# Patient Record
Sex: FEMALE | Race: BLACK OR AFRICAN AMERICAN | Employment: UNEMPLOYED | URBAN - METROPOLITAN AREA
[De-identification: names, ages, dates, MRNs, and addresses within clinical notes are randomized per-mention and may not be internally consistent; named-entity substitution may affect disease eponyms.]

---

## 2017-03-01 ENCOUNTER — HOSPITAL ENCOUNTER (OUTPATIENT)
Dept: LAB | Age: 52
Discharge: HOME | End: 2017-03-01
Payer: MEDICARE

## 2017-03-01 PROCEDURE — 36415 COLL VENOUS BLD VENIPUNCTURE: CPT

## 2017-03-01 PROCEDURE — 83036 HEMOGLOBIN GLYCOSYLATED A1C: CPT

## 2017-03-01 PROCEDURE — 80061 LIPID PANEL: CPT

## 2017-03-01 PROCEDURE — 81001 URINALYSIS AUTO W/SCOPE: CPT

## 2017-03-01 PROCEDURE — 80053 COMPREHEN METABOLIC PANEL: CPT

## 2017-03-08 ENCOUNTER — OFFICE VISIT (OUTPATIENT)
Dept: ENDOCRINOLOGY | Age: 52
End: 2017-03-08

## 2017-03-08 VITALS
HEART RATE: 53 BPM | SYSTOLIC BLOOD PRESSURE: 129 MMHG | BODY MASS INDEX: 41.02 KG/M2 | TEMPERATURE: 98.4 F | HEIGHT: 71 IN | DIASTOLIC BLOOD PRESSURE: 65 MMHG | RESPIRATION RATE: 18 BRPM | WEIGHT: 293 LBS

## 2017-03-08 DIAGNOSIS — E11.65 TYPE 2 DIABETES MELLITUS WITH HYPERGLYCEMIA, WITH LONG-TERM CURRENT USE OF INSULIN (HCC): Primary | ICD-10-CM

## 2017-03-08 DIAGNOSIS — I10 ESSENTIAL HYPERTENSION WITH GOAL BLOOD PRESSURE LESS THAN 140/90: ICD-10-CM

## 2017-03-08 DIAGNOSIS — Z79.4 TYPE 2 DIABETES MELLITUS WITH HYPERGLYCEMIA, WITH LONG-TERM CURRENT USE OF INSULIN (HCC): Primary | ICD-10-CM

## 2017-03-08 DIAGNOSIS — E78.2 HYPERLIPIDEMIA, MIXED: ICD-10-CM

## 2017-03-08 RX ORDER — INSULIN PUMP SYRINGE, 3 ML
EACH MISCELLANEOUS
Qty: 1 KIT | Refills: 0 | Status: SHIPPED | OUTPATIENT
Start: 2017-03-08 | End: 2018-05-18 | Stop reason: SDUPTHER

## 2017-03-08 RX ORDER — LANCETS 33 GAUGE
EACH MISCELLANEOUS
Qty: 100 LANCET | Refills: 11 | Status: SHIPPED | OUTPATIENT
Start: 2017-03-08 | End: 2017-06-09 | Stop reason: SDUPTHER

## 2017-03-08 NOTE — PROGRESS NOTES
[facility] AND ENDOCRINOLOGY               Celena Schuster MD        [address] 78 444 29 66 Fax [phone] ( [facility]) [address] Didi Schwartz [address] SJ:[phone] Fax [phone] ( Monday)          Patient Information  Date:3/8/2017  Name : Yandy Ortega 49 y.o.     YOB: 1965         Referred by: Jacquelin Loco NP         Chief Complaint   Patient presents with    Diabetes     3 mo f/u    Cholesterol Problem     3 mo f/u    Hypertension     3 mo f/u       History of Present Illness: Yandy Ortega is a 49 y.o. female here for fu of  Type 2 Diabetes Mellitus. She was referred by Jacquelin Loco NP for evaluation and management of diabetes. Checking glucose at home  Has not noticed significant hypoglycemia     Compliant with medications    No acute issues            She was diagnosed with type 2 diabetes 30 years ago,          She has tingling and numbness in her feet. No hypoglycemia. She has been gaining weight, no regular exercise. Wt Readings from Last 3 Encounters:   03/08/17 318 lb 12.8 oz (144.6 kg)   12/07/16 310 lb (140.6 kg)   05/31/16 293 lb 1.6 oz (132.9 kg)       BP Readings from Last 3 Encounters:   03/08/17 129/65   12/07/16 109/54   05/31/16 97/51           Past Medical History:   Diagnosis Date    CAD (coronary artery disease)     Dr Britni Salinas [facility])     Hypertension      Current Outpatient Prescriptions   Medication Sig    SITagliptin (JANUVIA) 100 mg tablet Take 1 Tab by mouth daily.  pioglitazone (ACTOS) 30 mg tablet Take 1 Tab by mouth every morning.  oxybutynin chloride XL (DITROPAN XL) 10 mg CR tablet Take 1 Tab by mouth daily.  metFORMIN (GLUCOPHAGE) 1,000 mg tablet Take 1 Tab by mouth two (2) times daily (with meals). Stop Glipizide (Patient taking differently: Take 1,000 mg by mouth daily.  Stop Glipizide)    Lancets misc Test blood glucose twice daily Dx Code: E11.65    Insulin Needles, Disposable, (CARLIE PEN NEEDLE) 32 gauge x 5/32\" ndle Use to inject Lantus daily    insulin glargine (LANTUS SOLOSTAR) 100 unit/mL (3 mL) pen Inject 15 units at bedtime    glucose blood VI test strips (ONETOUCH ULTRA TEST) strip Test blood glucose twice daily Dx Code: E11.65    Blood-Glucose Meter (ONETOUCH ULTRAMINI) monitoring kit Test blood glucose twice daily Dx Code: E11.65    atorvastatin (LIPITOR) 20 mg tablet Take 20 mg by mouth daily.  nitroglycerin (NITROSTAT) 0.4 mg SL tablet 0.4 mg by SubLINGual route every five (5) minutes as needed for Chest Pain.  clopidogrel (PLAVIX) 75 mg tablet Take 75 mg by mouth daily.  metoprolol succinate (TOPROL-XL) 25 mg XL tablet Take 25 mg by mouth daily.  lisinopril (PRINIVIL, ZESTRIL) 10 mg tablet Take 10 mg by mouth daily.  FLUoxetine (PROZAC) 20 mg capsule Take 20 mg by mouth daily. No current facility-administered medications for this visit. No Known Allergies      Review of Systems:  - Constitutional Symptoms: no fevers, no chills, no weight loss  - Eyes: no blurry vision no double vision  - Musculoskeletal: + joint pains +  weakness  - Integumentary: no rashes  - Neurological: + numbness, tingling, no  headaches  - Psychiatric: no depression no  anxiety  - Endocrine: no heat or cold intolerance    Physical Examination:   Blood pressure 129/65, pulse (!) 53, temperature 98.4 °F (36.9 °C), temperature source Oral, resp. rate 18, height 5' 11\" (1.803 m), weight 318 lb 12.8 oz (144.6 kg). Estimated body mass index is 44.46 kg/(m^2) as calculated from the following:    Height as of this encounter: 5' 11\" (1.803 m). -   Weight as of this encounter: 318 lb 12.8 oz (144.6 kg).   - General: pleasant, no distress, good eye contact  - HEENT: no pallor, no periorbital edema, EOMI  - Neck: supple, no thyromegaly, no nodules  - Cardiovascular: regular, normal rate, normal S1 and S2,  - Respiratory: clear to auscultation bilaterally  - Gastrointestinal: soft, nontender, nondistended,  BS +  - Musculoskeletal:  no edema,   - Neurological: alert and oriented  - Psychiatric: normal mood and affect  - Skin: color, texture, turgor normal.       Data Reviewed:     [] Glucose records reviewed. [] See glucose records for details (to be scanned). [] A1C  [] Reviewed labs        Assessment/Plan:     1. Type 2 diabetes mellitus with hyperglycemia, with long-term current use of insulin ([facility] [address]    2. Essential hypertension with goal blood pressure less than 140/90    3. Hyperlipidemia, mixed        1. Type 2 Diabetes Mellitus , neuropathy   Lab Results   Component Value Date/Time    Hemoglobin A1c 7.9 03/01/2017 09:01 AM    Hemoglobin A1c (POC) 7.6 12/07/2016 03:54 PM      continue Metformin, Januvia and Actos. Lantus -   Discussed the importance of checking home glucose regularly and taking all of their scheduled medications in order to have the best possible outcome. Reviewed the complications and importance of diet. Advised to check glucose once daily  FLU annually ,Pneumovax ,aspirin daily,annual eye exam,microalbumin    2. HTN : Continue current therapy     3. Hyperlipidemia : Continue statin. 4.Obesity:Body mass index is 44.46 kg/(m^2). Discussed about the importance of exercise and carbohydrate portion control. 5. Bradycardia, asymptomatic - managed by Cardiology - on B blockers       There are no Patient Instructions on file for this visit. Follow-up Disposition: Not on File    Thank you for allowing me to participate in the care of this patient.     Joselo Ellis MD

## 2017-03-08 NOTE — MR AVS SNAPSHOT
Visit Information Date & Time Provider Department Dept. Phone Encounter #  
 3/8/2017 10:00 AM Nohemy Bolaños MD [facility] Diabetes & Endocrinology [phone] [id number] Follow-up Instructions Return in about 3 months (around 6/8/2017). Upcoming Health Maintenance Date Due  
 FOOT EXAM Q1 11/16/1976 EYE EXAM RETINAL OR DILATED Q1 11/16/1976 Pneumococcal 19-64 Medium Risk (1 of 1 - PPSV23) 11/16/1985 DTaP/Tdap/Td series (1 - Tdap) 11/16/1987 PAP AKA CERVICAL CYTOLOGY 11/16/1987 INFLUENZA AGE 9 TO ADULT 8/1/2016 BREAST CANCER SCRN MAMMOGRAM 11/16/2016 FOBT Q 1 YEAR AGE 50-75 11/16/2016 MICROALBUMIN Q1 5/24/2017 HEMOGLOBIN A1C Q6M 9/1/2017 LIPID PANEL Q1 3/1/2018 Allergies as of 3/8/2017  Review Complete On: 3/8/2017 By: Nohemy Bolaños MD  
 No Known Allergies Current Immunizations  Never Reviewed No immunizations on file. Not reviewed this visit You Were Diagnosed With   
  
 Codes Comments Type 2 diabetes mellitus with hyperglycemia, with long-term current use of insulin (HCC)    -  Primary ICD-10-CM: E11.65, Z79.4 ICD-9-CM: 250.00, 790.29, V58.67 Essential hypertension with goal blood pressure less than 140/90     ICD-10-CM: I10 
ICD-9-CM: 401.9 Hyperlipidemia, mixed     ICD-10-CM: E78.2 ICD-9-CM: 272.2 Vitals BP Pulse Temp Resp Height(growth percentile) Weight(growth percentile) 129/65 (BP 1 Location: Right arm, BP Patient Position: Sitting) (!) 53 98.4 °F (36.9 °C) (Oral) 18 5' 11\" (1.803 m) 318 lb 12.8 oz (144.6 kg) BMI OB Status Smoking Status 44.46 kg/m2 Ablation Former Smoker Vitals History BMI and BSA Data Body Mass Index Body Surface Area 44.46 kg/m 2 2.69 m 2 Preferred Pharmacy Pharmacy Name Phone [id number] [address] [phone] Your Updated Medication List  
  
   
 This list is accurate as of: 3/8/17 10:56 AM.  Always use your most recent med list.  
  
  
  
  
 atorvastatin 20 mg tablet Commonly known as:  LIPITOR Take 20 mg by mouth daily. Blood-Glucose Meter monitoring kit Commonly known as:  Tulsa Lang Test blood glucose twice daily Dx Code: E11.65  
  
 clopidogrel 75 mg Tab Commonly known as:  PLAVIX Take 75 mg by mouth daily. FLUoxetine 20 mg capsule Commonly known as:  PROzac Take 20 mg by mouth daily. glucose blood VI test strips strip Commonly known as:  ONETOUCH ULTRA TEST Test blood glucose twice daily Dx Code: E11.65  
  
 insulin glargine 100 unit/mL (3 mL) pen Commonly known as:  LANTUS SOLOSTAR Inject 15 units at bedtime Insulin Needles (Disposable) 32 gauge x 5/32\" Ndle Commonly known as:  Verónica Pen Needle Use to inject Lantus daily Lancets Misc Test blood glucose twice daily Dx Code: E11.65  
  
 lisinopril 10 mg tablet Commonly known as:  Kimberly Deborah Take 10 mg by mouth daily. metFORMIN 1,000 mg tablet Commonly known as:  GLUCOPHAGE Take 1 Tab by mouth two (2) times daily (with meals). Stop Glipizide  
  
 metoprolol succinate 25 mg XL tablet Commonly known as:  TOPROL-XL Take 25 mg by mouth daily. nitroglycerin 0.4 mg SL tablet Commonly known as:  NITROSTAT  
0.4 mg by SubLINGual route every five (5) minutes as needed for Chest Pain. oxybutynin chloride XL 10 mg CR tablet Commonly known as:  DITROPAN XL Take 1 Tab by mouth daily. pioglitazone 30 mg tablet Commonly known as:  ACTOS Take 1 Tab by mouth every morning. SITagliptin 100 mg tablet Commonly known as:  Cat Roxie Take 1 Tab by mouth daily. Follow-up Instructions Return in about 3 months (around 6/8/2017). Introducing [facility] & HEALTH SERVICES!    
 [address] introduces Websand patient portal. Now you can access parts of your medical record, email your doctor's office, and request medication refills online. 1. In your internet browser, go to [email or web address]. [email or web address]/[employer] 2. Click on the First Time User? Click Here link in the Sign In box. You will see the New Member Sign Up page. 3. Enter your Veracode Access Code exactly as it appears below. You will not need to use this code after youve completed the sign-up process. If you do not sign up before the expiration date, you must request a new code. · Veracode Access Code: [email or web address] Expires: 6/6/2017 10:56 AM 
 
4. Enter the last four digits of your Social Security Number (xxxx) and Date of Birth (mm/dd/yyyy) as indicated and click Submit. You will be taken to the next sign-up page. 5. Create a Veracode ID. This will be your Veracode login ID and cannot be changed, so think of one that is secure and easy to remember. 6. Create a Veracode password. You can change your password at any time. 7. Enter your Password Reset Question and Answer. This can be used at a later time if you forget your password. 8. Enter your e-mail address. You will receive e-mail notification when new information is available in [address] 9. Click Sign Up. You can now view and download portions of your medical record. 10. Click the Download Summary menu link to download a portable copy of your medical information. If you have questions, please visit the Frequently Asked Questions section of the Veracode website. Remember, Veracode is NOT to be used for urgent needs. For medical emergencies, dial 911. Now available from your iPhone and Android! Please provide this summary of care documentation to your next provider. Your primary care clinician is listed as Wilmer Mckeon. If you have any questions after today's visit, please call [phone].

## 2017-03-08 NOTE — PROGRESS NOTES
Deborah Maldonado is a 49 y.o. female here for   Chief Complaint   Patient presents with    Diabetes     3 mo f/u    Cholesterol Problem     3 mo f/u    Hypertension     3 mo f/u       Functional glucose monitor and record keeping system? - yes  Eye exam within last year? - last yr  Foot exam within last year? - no    Lab Results   Component Value Date/Time    Hemoglobin A1c 7.9 03/01/2017 09:01 AM    Hemoglobin A1c (POC) 7.6 12/07/2016 03:54 PM       Wt Readings from Last 3 Encounters:   12/07/16 310 lb (140.6 kg)   05/31/16 293 lb 1.6 oz (132.9 kg)   02/29/16 289 lb 9.6 oz (131.4 kg)     Temp Readings from Last 3 Encounters:   12/07/16 98.7 °F (37.1 °C) (Oral)   05/31/16 98.1 °F (36.7 °C) (Oral)   02/29/16 97 °F (36.1 °C) (Oral)     BP Readings from Last 3 Encounters:   12/07/16 109/54   05/31/16 97/51   02/29/16 111/60     Pulse Readings from Last 3 Encounters:   12/07/16 (!) 54   05/31/16 (!) 47   02/29/16 (!) 54

## 2017-04-04 DIAGNOSIS — E11.65 TYPE 2 DIABETES MELLITUS WITH HYPERGLYCEMIA, WITH LONG-TERM CURRENT USE OF INSULIN (HCC): Primary | ICD-10-CM

## 2017-04-04 DIAGNOSIS — Z79.4 TYPE 2 DIABETES MELLITUS WITH HYPERGLYCEMIA, WITH LONG-TERM CURRENT USE OF INSULIN (HCC): Primary | ICD-10-CM

## 2017-04-04 NOTE — TELEPHONE ENCOUNTER
Option is to try Trulicity 5.01 mg weekly , stop Lantus and Januvia   Close monitoring of sugars - if it works we can increase the dose and Trulicity will help with weight loss also     Nausea and vomiting are some of the side effects but mostly temporary     She can be trained at out office     Can get 30 day free trial card - if it Norval Bloodgood will go back to old regimen

## 2017-04-04 NOTE — TELEPHONE ENCOUNTER
Patient says she was instructed to call back with pricing of trulicity. Patient says trulicity and Lantus are the same price.

## 2017-04-04 NOTE — TELEPHONE ENCOUNTER
Informed pt she can try Trulicity 7.52 mg weekly and she can stop Lantus and Januvia once she starts Trulicity. Asked pt to monitor BG closely while on the medication and if it works Dr Moses Guido will increase the dose which will help with weight loss. Pt states she will stop by the office tomorrow afternoon for training and 30 day free trial card.

## 2017-06-09 ENCOUNTER — OFFICE VISIT (OUTPATIENT)
Dept: ENDOCRINOLOGY | Age: 52
End: 2017-06-09

## 2017-06-09 VITALS
HEIGHT: 71 IN | TEMPERATURE: 97.7 F | HEART RATE: 50 BPM | BODY MASS INDEX: 41.02 KG/M2 | DIASTOLIC BLOOD PRESSURE: 62 MMHG | SYSTOLIC BLOOD PRESSURE: 128 MMHG | OXYGEN SATURATION: 97 % | RESPIRATION RATE: 16 BRPM | WEIGHT: 293 LBS

## 2017-06-09 DIAGNOSIS — E78.2 HYPERLIPIDEMIA, MIXED: ICD-10-CM

## 2017-06-09 DIAGNOSIS — L73.9 FOLLICULITIS: ICD-10-CM

## 2017-06-09 DIAGNOSIS — Z79.4 UNCONTROLLED TYPE 2 DIABETES MELLITUS WITH HYPERGLYCEMIA, WITH LONG-TERM CURRENT USE OF INSULIN (HCC): ICD-10-CM

## 2017-06-09 DIAGNOSIS — E11.65 TYPE 2 DIABETES MELLITUS WITH HYPERGLYCEMIA, WITH LONG-TERM CURRENT USE OF INSULIN (HCC): Primary | ICD-10-CM

## 2017-06-09 DIAGNOSIS — N32.81 OVERACTIVE BLADDER: ICD-10-CM

## 2017-06-09 DIAGNOSIS — I10 ESSENTIAL HYPERTENSION WITH GOAL BLOOD PRESSURE LESS THAN 140/90: ICD-10-CM

## 2017-06-09 DIAGNOSIS — Z79.4 TYPE 2 DIABETES MELLITUS WITH HYPERGLYCEMIA, WITH LONG-TERM CURRENT USE OF INSULIN (HCC): Primary | ICD-10-CM

## 2017-06-09 DIAGNOSIS — E11.65 TYPE 2 DIABETES MELLITUS WITH HYPERGLYCEMIA, WITH LONG-TERM CURRENT USE OF INSULIN (HCC): ICD-10-CM

## 2017-06-09 DIAGNOSIS — Z79.4 TYPE 2 DIABETES MELLITUS WITH HYPERGLYCEMIA, WITH LONG-TERM CURRENT USE OF INSULIN (HCC): ICD-10-CM

## 2017-06-09 DIAGNOSIS — E11.65 UNCONTROLLED TYPE 2 DIABETES MELLITUS WITH HYPERGLYCEMIA, WITH LONG-TERM CURRENT USE OF INSULIN (HCC): ICD-10-CM

## 2017-06-09 LAB
GLUCOSE POC: 126 MG/DL
HBA1C MFR BLD HPLC: 6.5 %

## 2017-06-09 RX ORDER — PEN NEEDLE, DIABETIC 31 GX3/16"
NEEDLE, DISPOSABLE MISCELLANEOUS
Qty: 100 PEN NEEDLE | Refills: 11 | Status: SHIPPED | OUTPATIENT
Start: 2017-06-09 | End: 2018-03-26 | Stop reason: SDUPTHER

## 2017-06-09 RX ORDER — METFORMIN HYDROCHLORIDE 1000 MG/1
1000 TABLET ORAL 2 TIMES DAILY WITH MEALS
Qty: 60 TAB | Refills: 5 | Status: SHIPPED | OUTPATIENT
Start: 2017-06-09 | End: 2021-07-29 | Stop reason: SDUPTHER

## 2017-06-09 RX ORDER — OXYBUTYNIN CHLORIDE 10 MG/1
10 TABLET, EXTENDED RELEASE ORAL DAILY
Qty: 30 TAB | Refills: 5 | Status: SHIPPED | OUTPATIENT
Start: 2017-06-09 | End: 2018-03-26 | Stop reason: SDUPTHER

## 2017-06-09 RX ORDER — LANCETS 33 GAUGE
EACH MISCELLANEOUS
Qty: 100 LANCET | Refills: 11 | Status: SHIPPED | OUTPATIENT
Start: 2017-06-09 | End: 2018-05-18 | Stop reason: SDUPTHER

## 2017-06-09 RX ORDER — PIOGLITAZONEHYDROCHLORIDE 30 MG/1
30 TABLET ORAL
Qty: 30 TAB | Refills: 5 | Status: SHIPPED | OUTPATIENT
Start: 2017-06-09 | End: 2018-01-12 | Stop reason: ALTCHOICE

## 2017-06-09 NOTE — MR AVS SNAPSHOT
Visit Information Date & Time Provider Department Dept. Phone Encounter #  
 6/9/2017  9:00 AM Brent Real MD [facility] Diabetes & Endocrinology [phone] [id number] Follow-up Instructions Return in about 3 months (around 9/9/2017). Upcoming Health Maintenance Date Due  
 FOOT EXAM Q1 11/16/1976 EYE EXAM RETINAL OR DILATED Q1 11/16/1976 Pneumococcal 19-64 Medium Risk (1 of 1 - PPSV23) 11/16/1985 DTaP/Tdap/Td series (1 - Tdap) 11/16/1987 PAP AKA CERVICAL CYTOLOGY 11/16/1987 BREAST CANCER SCRN MAMMOGRAM 11/16/2016 FOBT Q 1 YEAR AGE 50-75 11/16/2016 MICROALBUMIN Q1 5/24/2017 INFLUENZA AGE 9 TO ADULT 8/1/2017 HEMOGLOBIN A1C Q6M 9/1/2017 LIPID PANEL Q1 3/1/2018 Allergies as of 6/9/2017  Review Complete On: 6/9/2017 By: Brent Real MD  
 No Known Allergies Current Immunizations  Never Reviewed No immunizations on file. Not reviewed this visit You Were Diagnosed With   
  
 Codes Comments Type 2 diabetes mellitus with hyperglycemia, with long-term current use of insulin (HCC)    -  Primary ICD-10-CM: E11.65, Z79.4 ICD-9-CM: 250.00, 790.29, V58.67 Essential hypertension with goal blood pressure less than 140/90     ICD-10-CM: I10 
ICD-9-CM: 401.9 Hyperlipidemia, mixed     ICD-10-CM: E78.2 ICD-9-CM: 272.2 Vitals BP Pulse Temp Resp Height(growth percentile) Weight(growth percentile) 128/62 (BP 1 Location: Left arm, BP Patient Position: Sitting) (!) 50 97.7 °F (36.5 °C) (Oral) 16 5' 11\" (1.803 m) 313 lb 14.4 oz (142.4 kg) SpO2 BMI OB Status Smoking Status 97% 43.78 kg/m2 Ablation Former Smoker Vitals History BMI and BSA Data Body Mass Index Body Surface Area 43.78 kg/m 2 2.67 m 2 Preferred Pharmacy Pharmacy Name Phone [id number] [address] Tam[address] [phone] Your Updated Medication List  
  
   
 This list is accurate as of: 6/9/17  9:33 AM.  Always use your most recent med list.  
  
  
  
  
 atorvastatin 20 mg tablet Commonly known as:  LIPITOR Take 20 mg by mouth daily. Blood-Glucose Meter monitoring kit Commonly known as:  TRUE METRIX AIR GLUCOSE METER Test blood glucose twice daily Dx Code: E11.65  
  
 clopidogrel 75 mg Tab Commonly known as:  PLAVIX Take 75 mg by mouth daily. dulaglutide 0.75 mg/0.5 mL sub-q pen Commonly known as:  TRULICITY  
0.5 mL by SubCUTAneous route every seven (7) days. glucose blood VI test strips strip Commonly known as:  TRUE METRIX GLUCOSE TEST STRIP Test blood glucose twice daily Dx Code: E11.65 Insulin Needles (Disposable) 32 gauge x 5/32\" Ndle Commonly known as:  Verónica Pen Needle Use to inject Lantus daily  
  
 lancets 33 gauge Misc Commonly known as:  Sabino Kanguillermo Test blood glucose twice daily Dx Code: E11.65  
  
 lisinopril 10 mg tablet Commonly known as:  Roxianne Daughters Take 10 mg by mouth daily. metFORMIN 1,000 mg tablet Commonly known as:  GLUCOPHAGE Take 1 Tab by mouth two (2) times daily (with meals). Stop Glipizide  
  
 metoprolol succinate 25 mg XL tablet Commonly known as:  TOPROL-XL Take 25 mg by mouth daily. nitroglycerin 0.4 mg SL tablet Commonly known as:  NITROSTAT  
0.4 mg by SubLINGual route every five (5) minutes as needed for Chest Pain. oxybutynin chloride XL 10 mg CR tablet Commonly known as:  DITROPAN XL Take 1 Tab by mouth daily. pioglitazone 30 mg tablet Commonly known as:  ACTOS Take 1 Tab by mouth every morning. We Performed the Following AMB POC GLUCOSE, QUANTITATIVE, BLOOD [32740 CPT(R)] AMB POC HEMOGLOBIN A1C [01043 CPT(R)] Follow-up Instructions Return in about 3 months (around 9/9/2017). Introducing [facility] & HEALTH SERVICES!    
 [employer] introduces [employer]flix patient portal. Now you can access parts of your medical record, email your doctor's office, and request medication refills online. 1. In your internet browser, go to [email or web address]. [email or web address] 2. Click on the First Time User? Click Here link in the Sign In box. You will see the New Member Sign Up page. 3. Enter your Cloudwords Access Code exactly as it appears below. You will not need to use this code after youve completed the sign-up process. If you do not sign up before the expiration date, you must request a new code. · Cloudwords Access Code: [email or web address] Expires: 9/7/2017  9:33 AM 
 
4. Enter the last four digits of your Social Security Number (xxxx) and Date of Birth (mm/dd/yyyy) as indicated and click Submit. You will be taken to the next sign-up page. 5. Create a Cloudwords ID. This will be your Cloudwords login ID and cannot be changed, so think of one that is secure and easy to remember. 6. Create a Cloudwords password. You can change your password at any time. 7. Enter your Password Reset Question and Answer. This can be used at a later time if you forget your password. 8. Enter your e-mail address. You will receive e-mail notification when new information is available in [address] 9. Click Sign Up. You can now view and download portions of your medical record. 10. Click the Download Summary menu link to download a portable copy of your medical information. If you have questions, please visit the Frequently Asked Questions section of the Cloudwords website. Remember, Cloudwords is NOT to be used for urgent needs. For medical emergencies, dial 911. Now available from your iPhone and Android! Please provide this summary of care documentation to your next provider. Your primary care clinician is listed as Ela Amador. If you have any questions after today's visit, please call [phone].

## 2017-06-09 NOTE — PROGRESS NOTES
Keith Banks is a 49 y.o. female here for   Chief Complaint   Patient presents with    Diabetes       Functional glucose monitor and record keeping system? -no  Eye exam within last year? - no  Foot exam within last year? - no    1. Have you been to the ER, urgent care clinic since your last visit? Hospitalized since your last visit? -no    2. Have you seen or consulted any other health care providers outside of the [address] [place] [address] since your last visit?   Include any pap smears or colon screening.-no      Lab Results   Component Value Date/Time    Hemoglobin A1c 7.9 03/01/2017 09:01 AM    Hemoglobin A1c (POC) 7.6 12/07/2016 03:54 PM       Wt Readings from Last 3 Encounters:   03/08/17 318 lb 12.8 oz (144.6 kg)   12/07/16 310 lb (140.6 kg)   05/31/16 293 lb 1.6 oz (132.9 kg)     Temp Readings from Last 3 Encounters:   03/08/17 98.4 °F (36.9 °C) (Oral)   12/07/16 98.7 °F (37.1 °C) (Oral)   05/31/16 98.1 °F (36.7 °C) (Oral)     BP Readings from Last 3 Encounters:   03/08/17 129/65   12/07/16 109/54   05/31/16 97/51     Pulse Readings from Last 3 Encounters:   03/08/17 (!) 53   12/07/16 (!) 54   05/31/16 (!) 47

## 2017-06-09 NOTE — PROGRESS NOTES
[facility]               Eneida Mariscal MD        [address] 78 [phone] Fax [phone]  [id number])        [address] [address] [address] XM:[phone] Fax [phone] ( Monday)          Patient Information  Date:6/9/2017  Name : Maxim Torres 49 y.o.     YOB: 1965         Referred by: Cynthia Martinez NP         Chief Complaint   Patient presents with    Diabetes       History of Present Illness: Maxim Torres is a 49 y.o. female here for fu of  Type 2 Diabetes Mellitus. She was referred by Cynthia Martinez NP for evaluation and management of diabetes. Doing well on Trulicity   Lost weight   Checking glucose at home  Has not noticed significant hypoglycemia     Compliant with medications    No acute issues            She was diagnosed with type 2 diabetes 30 years ago,         Wt Readings from Last 3 Encounters:   06/09/17 313 lb 14.4 oz (142.4 kg)   03/08/17 318 lb 12.8 oz (144.6 kg)   12/07/16 310 lb (140.6 kg)       BP Readings from Last 3 Encounters:   06/09/17 128/62   03/08/17 129/65   12/07/16 109/54           Past Medical History:   Diagnosis Date    CAD (coronary artery disease)     Dr Maria Murillo [facility])     Hypertension      Current Outpatient Prescriptions   Medication Sig    dulaglutide (TRULICITY) 2.29 FP/0.4 mL sub-q pen 0.5 mL by SubCUTAneous route every seven (7) days.  Blood-Glucose Meter (TRUE METRIX AIR GLUCOSE METER) monitoring kit Test blood glucose twice daily Dx Code: E11.65    glucose blood VI test strips (TRUE METRIX GLUCOSE TEST STRIP) strip Test blood glucose twice daily Dx Code: E11.65    lancets (TRUEPLUS LANCETS) 33 gauge misc Test blood glucose twice daily Dx Code: E11.65    pioglitazone (ACTOS) 30 mg tablet Take 1 Tab by mouth every morning.  oxybutynin chloride XL (DITROPAN XL) 10 mg CR tablet Take 1 Tab by mouth daily.     metFORMIN (GLUCOPHAGE) 1,000 mg tablet Take 1 Tab by mouth two (2) times daily (with meals). Stop Glipizide (Patient taking differently: Take 1,000 mg by mouth daily. Stop Glipizide)    Insulin Needles, Disposable, (CARLIE PEN NEEDLE) 32 gauge x 5/32\" ndle Use to inject Lantus daily    atorvastatin (LIPITOR) 20 mg tablet Take 20 mg by mouth daily.  nitroglycerin (NITROSTAT) 0.4 mg SL tablet 0.4 mg by SubLINGual route every five (5) minutes as needed for Chest Pain.  clopidogrel (PLAVIX) 75 mg tablet Take 75 mg by mouth daily.  metoprolol succinate (TOPROL-XL) 25 mg XL tablet Take 25 mg by mouth daily.  lisinopril (PRINIVIL, ZESTRIL) 10 mg tablet Take 10 mg by mouth daily. No current facility-administered medications for this visit. No Known Allergies      Review of Systems:  - Constitutional Symptoms: no fevers, no chills, no weight loss  - Eyes: no blurry vision no double vision  - Musculoskeletal: + joint pains +  weakness  - Integumentary: no rashes  - Neurological: + numbness, tingling, no  headaches  - Psychiatric: no depression no  anxiety  - Endocrine: no heat or cold intolerance    Physical Examination:   Blood pressure 128/62, pulse (!) 50, temperature 97.7 °F (36.5 °C), temperature source Oral, resp. rate 16, height 5' 11\" (1.803 m), weight 313 lb 14.4 oz (142.4 kg), SpO2 97 %. Estimated body mass index is 43.78 kg/(m^2) as calculated from the following:    Height as of this encounter: 5' 11\" (1.803 m). -   Weight as of this encounter: 313 lb 14.4 oz (142.4 kg).   - General: pleasant, no distress, good eye contact  - HEENT: no pallor, no periorbital edema, EOMI  - Neck: supple, no thyromegaly, no nodules  - Cardiovascular: regular, normal rate, normal S1 and S2,  - Respiratory: clear to auscultation bilaterally  - Gastrointestinal: soft, nontender, nondistended,  BS +  - Musculoskeletal:  no edema,   - Neurological: alert and oriented  - Psychiatric: normal mood and affect  - Skin: color, texture, turgor normal.       Data Reviewed:     [] Glucose records reviewed. [] See glucose records for details (to be scanned). [] A1C  [] Reviewed labs        Assessment/Plan:     1. Type 2 diabetes mellitus with hyperglycemia, with long-term current use of insulin ([facility] [address]    2. Essential hypertension with goal blood pressure less than 140/90    3. Hyperlipidemia, mixed        1. Type 2 Diabetes Mellitus , neuropathy   Lab Results   Component Value Date/Time    Hemoglobin A1c 7.9 03/01/2017 09:01 AM    Hemoglobin A1c (POC) 6.5 06/09/2017 09:18 AM      continue Metformin, and Actos. Trulicity - increased the dose        Advised to check glucose once daily  FLU annually ,Pneumovax ,aspirin daily,annual eye exam,microalbumin    2. HTN : Continue current therapy     3. Hyperlipidemia : Continue statin. 4.Obesity:Body mass index is 43.78 kg/(m^2). Discussed about the importance of exercise and carbohydrate portion control. 5. Bradycardia, asymptomatic - managed by Cardiology - on B blockers       There are no Patient Instructions on file for this visit. Follow-up Disposition: Not on File    Thank you for allowing me to participate in the care of this patient.     Bella Wing MD

## 2017-09-06 ENCOUNTER — HOSPITAL ENCOUNTER (OUTPATIENT)
Dept: LAB | Age: 52
Discharge: HOME | End: 2017-09-06
Payer: MEDICARE

## 2017-09-06 PROCEDURE — 80061 LIPID PANEL: CPT

## 2017-09-06 PROCEDURE — 80053 COMPREHEN METABOLIC PANEL: CPT

## 2017-09-06 PROCEDURE — 83036 HEMOGLOBIN GLYCOSYLATED A1C: CPT

## 2017-09-06 PROCEDURE — 36415 COLL VENOUS BLD VENIPUNCTURE: CPT

## 2017-09-06 PROCEDURE — 82043 UR ALBUMIN QUANTITATIVE: CPT

## 2017-09-12 ENCOUNTER — OFFICE VISIT (OUTPATIENT)
Dept: ENDOCRINOLOGY | Age: 52
End: 2017-09-12

## 2017-09-12 VITALS
HEART RATE: 53 BPM | TEMPERATURE: 97.3 F | BODY MASS INDEX: 41.02 KG/M2 | HEIGHT: 71 IN | SYSTOLIC BLOOD PRESSURE: 112 MMHG | OXYGEN SATURATION: 98 % | DIASTOLIC BLOOD PRESSURE: 59 MMHG | WEIGHT: 293 LBS | RESPIRATION RATE: 16 BRPM

## 2017-09-12 DIAGNOSIS — E78.2 HYPERLIPIDEMIA, MIXED: ICD-10-CM

## 2017-09-12 DIAGNOSIS — I10 ESSENTIAL HYPERTENSION WITH GOAL BLOOD PRESSURE LESS THAN 140/90: ICD-10-CM

## 2017-09-12 DIAGNOSIS — E11.65 TYPE 2 DIABETES MELLITUS WITH HYPERGLYCEMIA, WITHOUT LONG-TERM CURRENT USE OF INSULIN (HCC): Primary | ICD-10-CM

## 2017-09-12 NOTE — PATIENT INSTRUCTIONS

## 2017-09-12 NOTE — PROGRESS NOTES
Bella Landry MD        Patient Information  Date:9/12/2017  Name : Ria Nur 49 y.o.     YOB: 1965         Referred by: Sachin Mondragon NP         Chief Complaint   Patient presents with    Diabetes       History of Present Illness: Ria Nur is a 49 y.o. female here for fu of  Type 2 Diabetes Mellitus. She was referred by Sachin Mondragon NP for evaluation and management of diabetes. Doing well on Trulicity   Lost weight   Checking glucose at home  Has not noticed significant hypoglycemia    Some BG are in 70 - 80   Compliant with medications    No acute issues            She was diagnosed with type 2 diabetes 30 years ago,         Wt Readings from Last 3 Encounters:   09/12/17 300 lb 14.4 oz (136.5 kg)   06/09/17 313 lb 14.4 oz (142.4 kg)   03/08/17 318 lb 12.8 oz (144.6 kg)       BP Readings from Last 3 Encounters:   09/12/17 112/59   06/09/17 128/62   03/08/17 129/65           Past Medical History:   Diagnosis Date    CAD (coronary artery disease)     Dr [facility])     Hypertension      Current Outpatient Prescriptions   Medication Sig    pioglitazone (ACTOS) 30 mg tablet Take 1 Tab by mouth every morning.  oxybutynin chloride XL (DITROPAN XL) 10 mg CR tablet Take 1 Tab by mouth daily.  metFORMIN (GLUCOPHAGE) 1,000 mg tablet Take 1 Tab by mouth two (2) times daily (with meals). Stop Glipizide    lancets (TRUEPLUS LANCETS) 33 gauge misc Test blood glucose twice daily Dx Code: E11.65    Insulin Needles, Disposable, (CARLIE PEN NEEDLE) 32 gauge x 5/32\" ndle Use to inject Lantus daily    glucose blood VI test strips (TRUE METRIX GLUCOSE TEST STRIP) strip Test blood glucose twice daily Dx Code: E11.65    dulaglutide (TRULICITY) 1.5 MN/4.5 mL sub-q pen 0.5 mL by SubCUTAneous route every seven (7) days.  Stop Januvia    Blood-Glucose Meter (TRUE METRIX AIR GLUCOSE METER) monitoring kit Test blood glucose twice daily Dx Code: E11.65    atorvastatin (LIPITOR) 20 mg tablet Take 20 mg by mouth daily.  nitroglycerin (NITROSTAT) 0.4 mg SL tablet 0.4 mg by SubLINGual route every five (5) minutes as needed for Chest Pain.  clopidogrel (PLAVIX) 75 mg tablet Take 75 mg by mouth daily.  metoprolol succinate (TOPROL-XL) 25 mg XL tablet Take 25 mg by mouth daily.  lisinopril (PRINIVIL, ZESTRIL) 10 mg tablet Take 10 mg by mouth daily. No current facility-administered medications for this visit. No Known Allergies      Review of Systems:  - Constitutional Symptoms: no fevers, no chills, no weight loss  - Eyes: no blurry vision no double vision  - Musculoskeletal: + joint pains +  weakness  - Integumentary: no rashes  - Neurological: + numbness, tingling, no  headaches  - Psychiatric: no depression no  anxiety  - Endocrine: no heat or cold intolerance    Physical Examination:   Blood pressure 112/59, pulse (!) 53, temperature 97.3 °F (36.3 °C), temperature source Oral, resp. rate 16, height 5' 11\" (1.803 m), weight 300 lb 14.4 oz (136.5 kg), SpO2 98 %. Estimated body mass index is 41.97 kg/(m^2) as calculated from the following:    Height as of this encounter: 5' 11\" (1.803 m). -   Weight as of this encounter: 300 lb 14.4 oz (136.5 kg). - General: pleasant, no distress, good eye contact  - HEENT: no pallor, no periorbital edema, EOMI  - Neck: supple, no thyromegaly, no nodules  - Cardiovascular: regular, normal rate, normal S1 and S2,  - Respiratory: clear to auscultation bilaterally  - Gastrointestinal: soft, nontender, nondistended,  BS +  - Musculoskeletal:  no edema,   - Neurological: alert and oriented  - Psychiatric: normal mood and affect  - Skin: color, texture, turgor normal.       Data Reviewed:     [] Glucose records reviewed. [] See glucose records for details (to be scanned). [] A1C  [] Reviewed labs        Assessment/Plan:     1.  Type 2 diabetes mellitus with hyperglycemia, without long-term current use of insulin ([facility] [address]    2. Essential hypertension with goal blood pressure less than 140/90    3. Hyperlipidemia, mixed        1. Type 2 Diabetes Mellitus , neuropathy   Lab Results   Component Value Date/Time    Hemoglobin A1c 5.8 09/06/2017 01:32 PM    Hemoglobin A1c (POC) 6.5 06/09/2017 09:18 AM      continue Metformin, stop Actos. Trulicity        Advised to check glucose once daily  FLU annually ,Pneumovax ,aspirin daily,annual eye exam,microalbumin    2. HTN : Continue current therapy   Take lasix every 3 rd day as needed ,     3. Hyperlipidemia : Continue statin. 4.Obesity:Body mass index is 41.97 kg/(m^2). Discussed about the importance of exercise and carbohydrate portion control. 5. Bradycardia, asymptomatic - managed by Cardiology - on B blockers       Patient Instructions        Preventing Falls: Care Instructions  Your Care Instructions  Getting around your home safely can be a challenge if you have injuries or health problems that make it easy for you to fall. Loose rugs and furniture in walkways are among the dangers for many older people who have problems walking or who have poor eyesight. People who have conditions such as arthritis, osteoporosis, or dementia also have to be careful not to fall. You can make your home safer with a few simple measures. Follow-up care is a key part of your treatment and safety. Be sure to make and go to all appointments, and call your doctor if you are having problems. It's also a good idea to know your test results and keep a list of the medicines you take. How can you care for yourself at home? Taking care of yourself  · You may get dizzy if you do not drink enough water. To prevent dehydration, drink plenty of fluids, enough so that your urine is light yellow or clear like water. Choose water and other caffeine-free clear liquids.  If you have kidney, heart, or liver disease and have to limit fluids, talk with your doctor before you increase the amount of fluids you drink. · Exercise regularly to improve your strength, muscle tone, and balance. Walk if you can. Swimming may be a good choice if you cannot walk easily. · Have your vision and hearing checked each year or any time you notice a change. If you have trouble seeing and hearing, you might not be able to avoid objects and could lose your balance. · Know the side effects of the medicines you take. Ask your doctor or pharmacist whether the medicines you take can affect your balance. Sleeping pills or sedatives can affect your balance. · Limit the amount of alcohol you drink. Alcohol can impair your balance and other senses. · Ask your doctor whether calluses or corns on your feet need to be removed. If you wear loose-fitting shoes because of calluses or corns, you can lose your balance and fall. · Talk to your doctor if you have numbness in your feet. Preventing falls at home  · Remove raised doorway thresholds, throw rugs, and clutter. Repair loose carpet or raised areas in the floor. · Move furniture and electrical cords to keep them out of walking paths. · Use nonskid floor wax, and wipe up spills right away, especially on ceramic tile floors. · If you use a walker or cane, put rubber tips on it. If you use crutches, clean the bottoms of them regularly with an abrasive pad, such as steel wool. · Keep your house well lit, especially Tonnie Kevin, and outside walkways. Use night-lights in areas such as hallways and bathrooms. Add extra light switches or use remote switches (such as switches that go on or off when you clap your hands) to make it easier to turn lights on if you have to get up during the night. · Install sturdy handrails on stairways. · Move items in your cabinets so that the things you use a lot are on the lower shelves (about waist level). · Keep a cordless phone and a flashlight with new batteries by your bed.  If possible, put a phone in each of the main rooms of your house, or carry a cell phone in case you fall and cannot reach a phone. Or, you can wear a device around your neck or wrist. You push a button that sends a signal for help. · Wear low-heeled shoes that fit well and give your feet good support. Use footwear with nonskid soles. Check the heels and soles of your shoes for wear. Repair or replace worn heels or soles. · Do not wear socks without shoes on wood floors. · Walk on the grass when the sidewalks are slippery. If you live in an area that gets snow and ice in the winter, sprinkle salt on slippery steps and sidewalks. Preventing falls in the bath  · Install grab bars and nonskid mats inside and outside your shower or tub and near the toilet and sinks. · Use shower chairs and bath benches. · Use a hand-held shower head that will allow you to sit while showering. · Get into a tub or shower by putting the weaker leg in first. Get out of a tub or shower with your strong side first.  · Repair loose toilet seats and consider installing a raised toilet seat to make getting on and off the toilet easier. · Keep your bathroom door unlocked while you are in the shower. Where can you learn more? Go to http://nanette-valencia.info/. Enter [id number] in the search box to learn more about \"Preventing Falls: Care Instructions. \"  Current as of: August 4, 2016  Content Version: 11.3  © 8558-5257 [employer]. Care instructions adapted under license by [employer] (which disclaims liability or warranty for this information). If you have questions about a medical condition or this instruction, always ask your healthcare professional. [place] [address] any warranty or liability for your use of this information. Follow-up Disposition: Not on File    Thank you for allowing me to participate in the care of this patient.     Amelia Min MD

## 2017-09-12 NOTE — MR AVS SNAPSHOT
Visit Information Date & Time Provider Department Dept. Phone Encounter #  
 9/12/2017  9:00 AM Josephine Quezada MD [facility] Diabetes [facility] Endocrinology [phone] [id number] Follow-up Instructions Return in about 4 months (around 1/12/2018). Upcoming Health Maintenance Date Due  
 FOOT EXAM Q1 11/16/1976 EYE EXAM RETINAL OR DILATED Q1 11/16/1976 Pneumococcal 19-64 Medium Risk (1 of 1 - PPSV23) 11/16/1985 DTaP/Tdap/Td series (1 - Tdap) 11/16/1987 PAP AKA CERVICAL CYTOLOGY 11/16/1987 BREAST CANCER SCRN MAMMOGRAM 11/16/2016 FOBT Q 1 YEAR AGE 50-75 11/16/2016 INFLUENZA AGE 9 TO ADULT 8/1/2017 HEMOGLOBIN A1C Q6M 3/6/2018 MICROALBUMIN Q1 9/6/2018 LIPID PANEL Q1 9/6/2018 Allergies as of 9/12/2017  Review Complete On: 9/12/2017 By: Ernesto Bullard LPN No Known Allergies Current Immunizations  Never Reviewed No immunizations on file. Not reviewed this visit You Were Diagnosed With   
  
 Codes Comments Type 2 diabetes mellitus with hyperglycemia, without long-term current use of insulin (HCC)    -  Primary ICD-10-CM: E11.65 ICD-9-CM: 250.00, 790.29 Essential hypertension with goal blood pressure less than 140/90     ICD-10-CM: I10 
ICD-9-CM: 401.9 Hyperlipidemia, mixed     ICD-10-CM: E78.2 ICD-9-CM: 272.2 Vitals BP Pulse Temp Resp Height(growth percentile) Weight(growth percentile) 112/59 (BP 1 Location: Right arm, BP Patient Position: Sitting) (!) 53 97.3 °F (36.3 °C) (Oral) 16 5' 11\" (1.803 m) 300 lb 14.4 oz (136.5 kg) SpO2 BMI OB Status Smoking Status 98% 41.97 kg/m2 Ablation Former Smoker Vitals History BMI and BSA Data Body Mass Index Body Surface Area 41.97 kg/m 2 2.61 m 2 Preferred Pharmacy Pharmacy Name Phone [id number] [address] [facility] [place] [phone] Your Updated Medication List  
  
   
 This list is accurate as of: 9/12/17  9:44 AM.  Always use your most recent med list.  
  
  
  
  
 atorvastatin 20 mg tablet Commonly known as:  LIPITOR Take 20 mg by mouth daily. Blood-Glucose Meter monitoring kit Commonly known as:  TRUE METRIX AIR GLUCOSE METER Test blood glucose twice daily Dx Code: E11.65  
  
 clopidogrel 75 mg Tab Commonly known as:  PLAVIX Take 75 mg by mouth daily. dulaglutide 1.5 mg/0.5 mL sub-q pen Commonly known as:  TRULICITY  
0.5 mL by SubCUTAneous route every seven (7) days. Stop Januvia  
  
 glucose blood VI test strips strip Commonly known as:  TRUE METRIX GLUCOSE TEST STRIP Test blood glucose twice daily Dx Code: E11.65 Insulin Needles (Disposable) 32 gauge x 5/32\" Ndle Commonly known as:  Verónica Pen Needle Use to inject Lantus daily  
  
 lancets 33 gauge Misc Commonly known as:  Waldemar Joe Test blood glucose twice daily Dx Code: E11.65  
  
 lisinopril 10 mg tablet Commonly known as:  Lynda Krishnamurthy Take 10 mg by mouth daily. metFORMIN 1,000 mg tablet Commonly known as:  GLUCOPHAGE Take 1 Tab by mouth two (2) times daily (with meals). Stop Glipizide  
  
 metoprolol succinate 25 mg XL tablet Commonly known as:  TOPROL-XL Take 25 mg by mouth daily. nitroglycerin 0.4 mg SL tablet Commonly known as:  NITROSTAT  
0.4 mg by SubLINGual route every five (5) minutes as needed for Chest Pain. oxybutynin chloride XL 10 mg CR tablet Commonly known as:  DITROPAN XL Take 1 Tab by mouth daily. pioglitazone 30 mg tablet Commonly known as:  ACTOS Take 1 Tab by mouth every morning. Follow-up Instructions Return in about 4 months (around 1/12/2018). Patient Instructions Preventing Falls: Care Instructions Your Care Instructions Getting around your home safely can be a challenge if you have injuries or health problems that make it easy for you to fall. Loose rugs and furniture in walkways are among the dangers for many older people who have problems walking or who have poor eyesight. People who have conditions such as arthritis, osteoporosis, or dementia also have to be careful not to fall. You can make your home safer with a few simple measures. Follow-up care is a key part of your treatment and safety. Be sure to make and go to all appointments, and call your doctor if you are having problems. It's also a good idea to know your test results and keep a list of the medicines you take. How can you care for yourself at home? Taking care of yourself · You may get dizzy if you do not drink enough water. To prevent dehydration, drink plenty of fluids, enough so that your urine is light yellow or clear like water. Choose water and other caffeine-free clear liquids. If you have kidney, heart, or liver disease and have to limit fluids, talk with your doctor before you increase the amount of fluids you drink. · Exercise regularly to improve your strength, muscle tone, and balance. Walk if you can. Swimming may be a good choice if you cannot walk easily. · Have your vision and hearing checked each year or any time you notice a change. If you have trouble seeing and hearing, you might not be able to avoid objects and could lose your balance. · Know the side effects of the medicines you take. Ask your doctor or pharmacist whether the medicines you take can affect your balance. Sleeping pills or sedatives can affect your balance. · Limit the amount of alcohol you drink. Alcohol can impair your balance and other senses. · Ask your doctor whether calluses or corns on your feet need to be removed. If you wear loose-fitting shoes because of calluses or corns, you can lose your balance and fall. · Talk to your doctor if you have numbness in your feet. Preventing falls at home · Remove raised doorway thresholds, throw rugs, and clutter. Repair loose carpet or raised areas in the floor. · Move furniture and electrical cords to keep them out of walking paths. · Use nonskid floor wax, and wipe up spills right away, especially on ceramic tile floors. · If you use a walker or cane, put rubber tips on it. If you use crutches, clean the bottoms of them regularly with an abrasive pad, such as steel wool. · Keep your house well lit, especially [place], and outside walkways. Use night-lights in areas such as hallways and bathrooms. Add extra light switches or use remote switches (such as switches that go on or off when you clap your hands) to make it easier to turn lights on if you have to get up during the night. · Install sturdy handrails on stairways. · Move items in your cabinets so that the things you use a lot are on the lower shelves (about waist level). · Keep a cordless phone and a flashlight with new batteries by your bed. If possible, put a phone in each of the main rooms of your house, or carry a cell phone in case you fall and cannot reach a phone. Or, you can wear a device around your neck or wrist. You push a button that sends a signal for help. · Wear low-heeled shoes that fit well and give your feet good support. Use footwear with nonskid soles. Check the heels and soles of your shoes for wear. Repair or replace worn heels or soles. · Do not wear socks without shoes on wood floors. · Walk on the grass when the sidewalks are slippery. If you live in an area that gets snow and ice in the winter, sprinkle salt on slippery steps and sidewalks. Preventing falls in the bath · Install grab bars and nonskid mats inside and outside your shower or tub and near the toilet and sinks. · Use shower chairs and bath benches. · Use a hand-held shower head that will allow you to sit while showering.  
· Get into a tub or shower by putting the weaker leg in first. Get out of a tub or shower with your strong side first. 
· Repair loose toilet seats and consider installing a raised toilet seat to make getting on and off the toilet easier. · Keep your bathroom door unlocked while you are in the shower. Where can you learn more? Go to http://naentte-valencia.info/. Enter [phone] in the search box to learn more about \"Preventing Falls: Care Instructions. \" Current as of: August 4, 2016 Content Version: 11.3 © 7698-2285 [employer]. Care instructions adapted under license by [employer] (which disclaims liability or warranty for this information). If you have questions about a medical condition or this instruction, always ask your healthcare professional. [address] any warranty or liability for your use of this information. Introducing [facility] & HEALTH SERVICES! [facility] introduces Organic To Go patient portal. Now you can access parts of your medical record, email your doctor's office, and request medication refills online. 1. In your internet browser, go to [email or web address] 2. Click on the First Time User? Click Here link in the Sign In box. You will see the New Member Sign Up page. 3. Enter your Organic To Go Access Code exactly as it appears below. You will not need to use this code after youve completed the sign-up process. If you do not sign up before the expiration date, you must request a new code. · Organic To Go Access Code: [email or web address] Expires: 12/11/2017  9:44 AM 
 
4. Enter the last four digits of your Social Security Number (xxxx) and Date of Birth (mm/dd/yyyy) as indicated and click Submit. You will be taken to the next sign-up page. 5. Create a Viva Visiont ID. This will be your Organic To Go login ID and cannot be changed, so think of one that is secure and easy to remember. 6. Create a Viva Visiont password. You can change your password at any time. 7. Enter your Password Reset Question and Answer. This can be used at a later time if you forget your password. 8. Enter your e-mail address. You will receive e-mail notification when new information is available in [address] 9. Click Sign Up. You can now view and download portions of your medical record. 10. Click the Download Summary menu link to download a portable copy of your medical information. If you have questions, please visit the Frequently Asked Questions section of the viseto website. Remember, viseto is NOT to be used for urgent needs. For medical emergencies, dial 911. Now available from your iPhone and Android! Please provide this summary of care documentation to your next provider. Your primary care clinician is listed as Marcelo Starks. If you have any questions after today's visit, please call [phone].

## 2017-09-12 NOTE — PROGRESS NOTES
Lourdes Vuong is a 49 y.o. female here for   Chief Complaint   Patient presents with    Diabetes       Functional glucose monitor and record keeping system? - yes  Eye exam within last year? - no  Foot exam within last year? - no    1. Have you been to the ER, urgent care clinic since your last visit? Hospitalized since your last visit? -no    2. Have you seen or consulted any other health care providers outside of the [address] [place] [address] since your last visit? Include any pap smears or colon screening. -PCP      Lab Results   Component Value Date/Time    Hemoglobin A1c 5.8 09/06/2017 01:32 PM    Hemoglobin A1c (POC) 6.5 06/09/2017 09:18 AM       Wt Readings from Last 3 Encounters:   06/09/17 313 lb 14.4 oz (142.4 kg)   03/08/17 318 lb 12.8 oz (144.6 kg)   12/07/16 310 lb (140.6 kg)     Temp Readings from Last 3 Encounters:   06/09/17 97.7 °F (36.5 °C) (Oral)   03/08/17 98.4 °F (36.9 °C) (Oral)   12/07/16 98.7 °F (37.1 °C) (Oral)     BP Readings from Last 3 Encounters:   06/09/17 128/62   03/08/17 129/65   12/07/16 109/54     Pulse Readings from Last 3 Encounters:   06/09/17 (!) 50   03/08/17 (!) 53   12/07/16 (!) 54

## 2017-11-29 DIAGNOSIS — E11.65 TYPE 2 DIABETES MELLITUS WITH HYPERGLYCEMIA, WITHOUT LONG-TERM CURRENT USE OF INSULIN (HCC): Primary | ICD-10-CM

## 2018-01-12 ENCOUNTER — OFFICE VISIT (OUTPATIENT)
Dept: ENDOCRINOLOGY | Age: 53
End: 2018-01-12

## 2018-01-12 VITALS
HEIGHT: 71 IN | DIASTOLIC BLOOD PRESSURE: 65 MMHG | HEART RATE: 46 BPM | TEMPERATURE: 98 F | RESPIRATION RATE: 14 BRPM | WEIGHT: 282.7 LBS | OXYGEN SATURATION: 99 % | BODY MASS INDEX: 39.58 KG/M2 | SYSTOLIC BLOOD PRESSURE: 125 MMHG

## 2018-01-12 DIAGNOSIS — E11.40 TYPE 2 DIABETES MELLITUS WITH DIABETIC NEUROPATHY, WITHOUT LONG-TERM CURRENT USE OF INSULIN (HCC): ICD-10-CM

## 2018-01-12 DIAGNOSIS — I10 ESSENTIAL HYPERTENSION WITH GOAL BLOOD PRESSURE LESS THAN 140/90: ICD-10-CM

## 2018-01-12 DIAGNOSIS — E11.65 TYPE 2 DIABETES MELLITUS WITH HYPERGLYCEMIA, WITH LONG-TERM CURRENT USE OF INSULIN (HCC): Primary | ICD-10-CM

## 2018-01-12 DIAGNOSIS — Z79.4 TYPE 2 DIABETES MELLITUS WITH HYPERGLYCEMIA, WITH LONG-TERM CURRENT USE OF INSULIN (HCC): Primary | ICD-10-CM

## 2018-01-12 DIAGNOSIS — E78.2 HYPERLIPIDEMIA, MIXED: ICD-10-CM

## 2018-01-12 LAB
GLUCOSE POC: 137 MG/DL
HBA1C MFR BLD HPLC: 5.6 %

## 2018-01-12 RX ORDER — GABAPENTIN 300 MG/1
CAPSULE ORAL
COMMUNITY
Start: 2017-12-14 | End: 2019-08-05

## 2018-01-12 NOTE — PROGRESS NOTES
Balwinder Cullen MD        Patient Information  Date:1/13/2018  Name : Mis Farrar 47 y.o.     YOB: 1965         Referred by: Kathryn Almonte NP         Chief Complaint   Patient presents with    Diabetes       History of Present Illness: Mis Farrar is a 47 y.o. female here for fu of  Type 2 Diabetes Mellitus. She was referred by Kathryn Almonte NP for evaluation and management of diabetes. Doing well on Trulicity   Lost weight   She is compliant with the medications, commended on the weight loss  No hypoglycemia. Intermittently she is taking Lantus which was discontinued to help her with sleep. She was diagnosed with type 2 diabetes 30 years ago,         Wt Readings from Last 3 Encounters:   01/12/18 282 lb 11.2 oz (128.2 kg)   09/12/17 300 lb 14.4 oz (136.5 kg)   06/09/17 313 lb 14.4 oz (142.4 kg)       BP Readings from Last 3 Encounters:   01/12/18 125/65   09/12/17 112/59   06/09/17 128/62           Past Medical History:   Diagnosis Date    CAD (coronary artery disease)     Dr Mora Bradshaw [facility])     Hypertension      Current Outpatient Prescriptions   Medication Sig    gabapentin (NEURONTIN) 300 mg capsule take 1 capsule by mouth three times a day    dulaglutide (TRULICITY) 1.5 GQ/9.8 mL sub-q pen 0.5 mL by SubCUTAneous route every seven (7) days. Stop Januvia    oxybutynin chloride XL (DITROPAN XL) 10 mg CR tablet Take 1 Tab by mouth daily.  metFORMIN (GLUCOPHAGE) 1,000 mg tablet Take 1 Tab by mouth two (2) times daily (with meals).  Stop Glipizide    lancets (TRUEPLUS LANCETS) 33 gauge misc Test blood glucose twice daily Dx Code: E11.65    Insulin Needles, Disposable, (CARLIE PEN NEEDLE) 32 gauge x 5/32\" ndle Use to inject Lantus daily    glucose blood VI test strips (TRUE METRIX GLUCOSE TEST STRIP) strip Test blood glucose twice daily Dx Code: E11.65    Blood-Glucose Meter (TRUE METRIX AIR GLUCOSE METER) monitoring kit Test blood glucose twice daily Dx Code: E11.65    atorvastatin (LIPITOR) 20 mg tablet Take 20 mg by mouth daily.  nitroglycerin (NITROSTAT) 0.4 mg SL tablet 0.4 mg by SubLINGual route every five (5) minutes as needed for Chest Pain.  metoprolol succinate (TOPROL-XL) 25 mg XL tablet Take 25 mg by mouth daily.  lisinopril (PRINIVIL, ZESTRIL) 10 mg tablet Take 10 mg by mouth daily. No current facility-administered medications for this visit. No Known Allergies      Review of Systems:  - Constitutional Symptoms: no fevers, no chills, no weight loss  - Eyes: no blurry vision no double vision  - Musculoskeletal: + joint pains +  weakness  - Integumentary: no rashes  - Neurological: + numbness, tingling, no  headaches  - Psychiatric: no depression no  anxiety  - Endocrine: no heat or cold intolerance    Physical Examination:   Blood pressure 125/65, pulse (!) 46, temperature 98 °F (36.7 °C), temperature source Oral, resp. rate 14, height 5' 11\" (1.803 m), weight 282 lb 11.2 oz (128.2 kg), SpO2 99 %. Estimated body mass index is 39.43 kg/(m^2) as calculated from the following:    Height as of this encounter: 5' 11\" (1.803 m). -   Weight as of this encounter: 282 lb 11.2 oz (128.2 kg).   - General: pleasant, no distress, good eye contact  - HEENT: no pallor, no periorbital edema, EOMI  - Neck: supple, no thyromegaly, no nodules  - Cardiovascular: regular, normal rate, normal S1 and S2,  - Respiratory: clear to auscultation bilaterally  - Gastrointestinal: soft, nontender, nondistended,  BS +  - Musculoskeletal:  no edema,   - Neurological: alert and oriented  - Psychiatric: normal mood and affect  - Skin: color, texture, turgor normal.     Diabetic foot exam:     Left: Skin -  xerosis   Vibratory sensation absent    Filament test absent sensation with micro filament   Pulse DP: 1+ (weak)    Deformities: onycomycosis    Right: Skin -  xerosis   Vibratory sensation absent   Filament test absent sensation with micro filament   Pulse DP: 2+ (normal)              Deformities: onychomycosis    Decreased sensation , onychomycosis    Data Reviewed:     [] Glucose records reviewed. [] See glucose records for details (to be scanned). [] A1C  [] Reviewed labs        Assessment/Plan:     1. Type 2 diabetes mellitus with hyperglycemia, with long-term current use of insulin ([facility]    2. Essential hypertension with goal blood pressure less than 140/90    3. Hyperlipidemia, mixed    4. Type 2 diabetes mellitus with diabetic neuropathy, without long-term current use of insulin ([address]        1. Type 2 Diabetes Mellitus , neuropathy   Lab Results   Component Value Date/Time    Hemoglobin A1c 5.8 09/06/2017 01:32 PM    Hemoglobin A1c (POC) 5.6 01/12/2018 09:55 AM   Controlled   continue Metformin, stopped Actos. Trulicity    Advised against using Lantus intermittently, discussed risks of hypoglycemia    Advised to check glucose once daily  FLU annually ,Pneumovax ,aspirin daily,annual eye exam,microalbumin    2. HTN : Continue current therapy       3. Hyperlipidemia : Continue statin. 4.Obesity:Body mass index is 39.43 kg/(m^2). Discussed about the importance of exercise and carbohydrate portion control. 5. Bradycardia, asymptomatic - managed by Cardiology - on B blockers       Peripheral neuropathy: Foot care    There are no Patient Instructions on file for this visit. Follow-up Disposition:  Return in about 4 months (around 5/12/2018). Thank you for allowing me to participate in the care of this patient.     Akin Fernandez MD

## 2018-01-12 NOTE — MR AVS SNAPSHOT
Visit Information Date & Time Provider Department Dept. Phone Encounter #  
 1/12/2018  9:30 AM Janny Nice MD [facility] Diabetes & Endocrinology [phone] [id number] Follow-up Instructions Return in about 4 months (around 5/12/2018). Upcoming Health Maintenance Date Due  
 EYE EXAM RETINAL OR DILATED Q1 11/16/1976 Pneumococcal 19-64 Medium Risk (1 of 1 - PPSV23) 11/16/1985 DTaP/Tdap/Td series (1 - Tdap) 11/16/1987 PAP AKA CERVICAL CYTOLOGY 11/16/1987 BREAST CANCER SCRN MAMMOGRAM 11/16/2016 FOBT Q 1 YEAR AGE 50-75 11/16/2016 Influenza Age 5 to Adult 8/1/2017 HEMOGLOBIN A1C Q6M 7/12/2018 MICROALBUMIN Q1 9/6/2018 LIPID PANEL Q1 9/6/2018 FOOT EXAM Q1 1/12/2019 Allergies as of 1/12/2018  Review Complete On: 1/12/2018 By: Raj Brand LPN No Known Allergies Current Immunizations  Never Reviewed No immunizations on file. Not reviewed this visit You Were Diagnosed With   
  
 Codes Comments Type 2 diabetes mellitus with hyperglycemia, with long-term current use of insulin (HCC)    -  Primary ICD-10-CM: E11.65, Z79.4 ICD-9-CM: 250.00, 790.29, V58.67 Essential hypertension with goal blood pressure less than 140/90     ICD-10-CM: I10 
ICD-9-CM: 401.9 Hyperlipidemia, mixed     ICD-10-CM: E78.2 ICD-9-CM: 272.2 Vitals BP Pulse Temp Resp Height(growth percentile) Weight(growth percentile) 125/65 (BP 1 Location: Left arm, BP Patient Position: Sitting) (!) 46 98 °F (36.7 °C) (Oral) 14 5' 11\" (1.803 m) 282 lb 11.2 oz (128.2 kg) SpO2 BMI OB Status Smoking Status 99% 39.43 kg/m2 Ablation Former Smoker Vitals History BMI and BSA Data Body Mass Index Body Surface Area  
 39.43 kg/m 2 2.53 m 2 Preferred Pharmacy Pharmacy Name Phone [id number] [address] Winter [address] [place] [address] [phone] Your Updated Medication List  
  
   
 This list is accurate as of: 1/12/18 10:21 AM.  Always use your most recent med list.  
  
  
  
  
 atorvastatin 20 mg tablet Commonly known as:  LIPITOR Take 20 mg by mouth daily. Blood-Glucose Meter monitoring kit Commonly known as:  TRUE METRIX AIR GLUCOSE METER Test blood glucose twice daily Dx Code: E11.65  
  
 dulaglutide 1.5 mg/0.5 mL sub-q pen Commonly known as:  TRULICITY  
0.5 mL by SubCUTAneous route every seven (7) days. Stop Januvia  
  
 gabapentin 300 mg capsule Commonly known as:  NEURONTIN  
take 1 capsule by mouth three times a day  
  
 glucose blood VI test strips strip Commonly known as:  TRUE METRIX GLUCOSE TEST STRIP Test blood glucose twice daily Dx Code: E11.65 Insulin Needles (Disposable) 32 gauge x 5/32\" Ndle Commonly known as:  Verónica Pen Needle Use to inject Lantus daily  
  
 lancets 33 gauge Misc Commonly known as:  Almetta Mcardle Test blood glucose twice daily Dx Code: E11.65  
  
 lisinopril 10 mg tablet Commonly known as:  Caballero Boor Take 10 mg by mouth daily. metFORMIN 1,000 mg tablet Commonly known as:  GLUCOPHAGE Take 1 Tab by mouth two (2) times daily (with meals). Stop Glipizide  
  
 metoprolol succinate 25 mg XL tablet Commonly known as:  TOPROL-XL Take 25 mg by mouth daily. nitroglycerin 0.4 mg SL tablet Commonly known as:  NITROSTAT  
0.4 mg by SubLINGual route every five (5) minutes as needed for Chest Pain. oxybutynin chloride XL 10 mg CR tablet Commonly known as:  DITROPAN XL Take 1 Tab by mouth daily. We Performed the Following AMB POC GLUCOSE, QUANTITATIVE, BLOOD [80163 CPT(R)] AMB POC HEMOGLOBIN A1C [41257 CPT(R)]  DIABETES FOOT EXAM [HM7 Custom] REFERRAL TO OPHTHALMOLOGY [REF57 Custom] Follow-up Instructions Return in about 4 months (around 5/12/2018). Referral Information Referral ID Referred By Referred To [id number] Heather Bauer SIMON Not Available Visits Status Start Date End Date 1 New Request 1/12/18 1/12/19 If your referral has a status of pending review or denied, additional information will be sent to support the outcome of this decision. Introducing [facility] & HEALTH SERVICES! [facility] introduces Yamsafer patient portal. Now you can access parts of your medical record, email your doctor's office, and request medication refills online. 1. In your internet browser, go to [email or web address] 2. Click on the First Time User? Click Here link in the Sign In box. You will see the New Member Sign Up page. 3. Enter your Yamsafer Access Code exactly as it appears below. You will not need to use this code after youve completed the sign-up process. If you do not sign up before the expiration date, you must request a new code. · Yamsafer Access Code: [email or web address] Expires: 4/12/2018 10:21 AM 
 
4. Enter the last four digits of your Social Security Number (xxxx) and Date of Birth (mm/dd/yyyy) as indicated and click Submit. You will be taken to the next sign-up page. 5. Create a Yamsafer ID. This will be your Yamsafer login ID and cannot be changed, so think of one that is secure and easy to remember. 6. Create a Yamsafer password. You can change your password at any time. 7. Enter your Password Reset Question and Answer. This can be used at a later time if you forget your password. 8. Enter your e-mail address. You will receive e-mail notification when new information is available in [address] 9. Click Sign Up. You can now view and download portions of your medical record. 10. Click the Download Summary menu link to download a portable copy of your medical information. If you have questions, please visit the Frequently Asked Questions section of the Yamsafer website. Remember, Yamsafer is NOT to be used for urgent needs. For medical emergencies, dial 911. Now available from your iPhone and Android! Please provide this summary of care documentation to your next provider. Your primary care clinician is listed as [employer]. If you have any questions after today's visit, please call [phone].

## 2018-01-12 NOTE — PROGRESS NOTES
Jonna Moraes is a 47 y.o. female here for   Chief Complaint   Patient presents with    Diabetes       Functional glucose monitor and record keeping system? - hasn't been checking x1 month  Eye exam within last year? - no, need referral    1. Have you been to the ER, urgent care clinic since your last visit? Hospitalized since your last visit? -no    2. Have you seen or consulted any other health care providers outside of the [address] [place] [address] since your last visit? Include any pap smears or colon screening. -PCP      Lab Results   Component Value Date/Time    Hemoglobin A1c 5.8 09/06/2017 01:32 PM    Hemoglobin A1c (POC) 6.5 06/09/2017 09:18 AM       Wt Readings from Last 3 Encounters:   09/12/17 300 lb 14.4 oz (136.5 kg)   06/09/17 313 lb 14.4 oz (142.4 kg)   03/08/17 318 lb 12.8 oz (144.6 kg)     Temp Readings from Last 3 Encounters:   09/12/17 97.3 °F (36.3 °C) (Oral)   06/09/17 97.7 °F (36.5 °C) (Oral)   03/08/17 98.4 °F (36.9 °C) (Oral)     BP Readings from Last 3 Encounters:   09/12/17 112/59   06/09/17 128/62   03/08/17 129/65     Pulse Readings from Last 3 Encounters:   09/12/17 (!) 53   06/09/17 (!) 50   03/08/17 (!) 53

## 2018-01-13 PROBLEM — E11.40 TYPE 2 DIABETES MELLITUS WITH DIABETIC NEUROPATHY, WITHOUT LONG-TERM CURRENT USE OF INSULIN (HCC): Status: ACTIVE | Noted: 2018-01-13

## 2018-03-26 ENCOUNTER — TELEPHONE (OUTPATIENT)
Dept: ENDOCRINOLOGY | Age: 53
End: 2018-03-26

## 2018-03-26 DIAGNOSIS — L73.9 FOLLICULITIS: ICD-10-CM

## 2018-03-26 DIAGNOSIS — E11.65 UNCONTROLLED TYPE 2 DIABETES MELLITUS WITH HYPERGLYCEMIA, WITH LONG-TERM CURRENT USE OF INSULIN (HCC): ICD-10-CM

## 2018-03-26 DIAGNOSIS — Z79.4 UNCONTROLLED TYPE 2 DIABETES MELLITUS WITH HYPERGLYCEMIA, WITH LONG-TERM CURRENT USE OF INSULIN (HCC): ICD-10-CM

## 2018-03-26 DIAGNOSIS — E78.2 HYPERLIPIDEMIA, MIXED: ICD-10-CM

## 2018-03-26 DIAGNOSIS — I10 ESSENTIAL HYPERTENSION WITH GOAL BLOOD PRESSURE LESS THAN 140/90: ICD-10-CM

## 2018-03-26 DIAGNOSIS — N32.81 OVERACTIVE BLADDER: ICD-10-CM

## 2018-03-26 RX ORDER — OXYBUTYNIN CHLORIDE 10 MG/1
10 TABLET, EXTENDED RELEASE ORAL DAILY
Qty: 30 TAB | Refills: 11 | Status: SHIPPED | OUTPATIENT
Start: 2018-03-26 | End: 2019-08-05 | Stop reason: ALTCHOICE

## 2018-03-26 RX ORDER — PEN NEEDLE, DIABETIC 31 GX3/16"
NEEDLE, DISPOSABLE MISCELLANEOUS
Qty: 100 PEN NEEDLE | Refills: 11 | Status: SHIPPED | OUTPATIENT
Start: 2018-03-26 | End: 2020-04-22

## 2018-03-26 NOTE — TELEPHONE ENCOUNTER
Patient has new insurance and need new prescriptions. Patient says she will be here at 11:30am and would to  new prescription for Oxybutynin and pen needlene 30 day supply.

## 2018-04-20 ENCOUNTER — ED HISTORICAL/CONVERTED ENCOUNTER (OUTPATIENT)
Dept: OTHER | Age: 53
End: 2018-04-20

## 2018-05-18 ENCOUNTER — OFFICE VISIT (OUTPATIENT)
Dept: ENDOCRINOLOGY | Age: 53
End: 2018-05-18

## 2018-05-18 VITALS
WEIGHT: 268.8 LBS | TEMPERATURE: 98.4 F | OXYGEN SATURATION: 99 % | HEART RATE: 49 BPM | HEIGHT: 71 IN | RESPIRATION RATE: 14 BRPM | SYSTOLIC BLOOD PRESSURE: 133 MMHG | BODY MASS INDEX: 37.63 KG/M2 | DIASTOLIC BLOOD PRESSURE: 65 MMHG

## 2018-05-18 DIAGNOSIS — E78.2 HYPERLIPIDEMIA, MIXED: ICD-10-CM

## 2018-05-18 DIAGNOSIS — E11.65 TYPE 2 DIABETES MELLITUS WITH HYPERGLYCEMIA, WITH LONG-TERM CURRENT USE OF INSULIN (HCC): ICD-10-CM

## 2018-05-18 DIAGNOSIS — Z79.4 TYPE 2 DIABETES MELLITUS WITH HYPERGLYCEMIA, WITH LONG-TERM CURRENT USE OF INSULIN (HCC): ICD-10-CM

## 2018-05-18 DIAGNOSIS — I10 ESSENTIAL HYPERTENSION WITH GOAL BLOOD PRESSURE LESS THAN 140/90: ICD-10-CM

## 2018-05-18 DIAGNOSIS — E11.40 TYPE 2 DIABETES MELLITUS WITH DIABETIC NEUROPATHY, WITHOUT LONG-TERM CURRENT USE OF INSULIN (HCC): Primary | ICD-10-CM

## 2018-05-18 PROBLEM — E66.01 SEVERE OBESITY (BMI 35.0-39.9) WITH COMORBIDITY (HCC): Status: ACTIVE | Noted: 2018-05-18

## 2018-05-18 RX ORDER — LANCETS 33 GAUGE
EACH MISCELLANEOUS
Qty: 200 LANCET | Refills: 3 | Status: SHIPPED | OUTPATIENT
Start: 2018-05-18 | End: 2019-02-14 | Stop reason: SDUPTHER

## 2018-05-18 RX ORDER — MULTIVITAMIN
1 TABLET ORAL DAILY
COMMUNITY
End: 2018-10-19

## 2018-05-18 RX ORDER — ASPIRIN 81 MG/1
81 TABLET ORAL DAILY
COMMUNITY
End: 2022-02-15 | Stop reason: SDUPTHER

## 2018-05-18 RX ORDER — INSULIN PUMP SYRINGE, 3 ML
EACH MISCELLANEOUS
Qty: 1 KIT | Refills: 0 | Status: SHIPPED | OUTPATIENT
Start: 2018-05-18 | End: 2020-09-25 | Stop reason: SDUPTHER

## 2018-05-18 NOTE — LETTER
5/19/2018 3:23 PM 
 
Patient:  Baldo Hernández YOB: 1965 Date of Visit: 5/18/2018 Dear Griselda Sweeney., NP 
[address] [id number] VIA Facsimile: [phone] 
 : Thank you for referring Ms. Travis Dumont to me for evaluation/treatment. Below are the relevant portions of my assessment and plan of care. If you have questions, please do not hesitate to call me. I look forward to following Ms. Zapata along with you. Sincerely, Leticia Aucna MD

## 2018-05-18 NOTE — PROGRESS NOTES
Ilana Cotton MD        Patient Information  Date:5/18/2018  Name : Kenyon Swift 47 y.o.     YOB: 1965         Referred by: Rosa Maria Drake NP         Chief Complaint   Patient presents with    Diabetes       History of Present Illness: Kenyon Swift is a 47 y.o. female here for fu of  Type 2 Diabetes Mellitus. She was referred by Rosa Maria Drake NP for evaluation and management of diabetes. Doing well on Trulicity   Lost weight   Decreased metformin  She is compliant with the medications, commended on the weight loss      She was diagnosed with type 2 diabetes 30 years ago,         Wt Readings from Last 3 Encounters:   05/18/18 268 lb 12.8 oz (121.9 kg)   01/12/18 282 lb 11.2 oz (128.2 kg)   09/12/17 300 lb 14.4 oz (136.5 kg)       BP Readings from Last 3 Encounters:   05/18/18 133/65   01/12/18 125/65   09/12/17 112/59           Past Medical History:   Diagnosis Date    CAD (coronary artery disease)     Dr Taran Adamson [facility])     Hypertension      Current Outpatient Prescriptions   Medication Sig    aspirin delayed-release 81 mg tablet Take 81 mg by mouth daily.  calcium-cholecalciferol, D3, (CALTRATE 600+D) tablet Take 1 Tab by mouth daily.  oxybutynin chloride XL (DITROPAN XL) 10 mg CR tablet Take 1 Tab by mouth daily.  Insulin Needles, Disposable, (CARLIE PEN NEEDLE) 32 gauge x 5/32\" ndle Use to inject insulin daily Dx Code: E11.65    gabapentin (NEURONTIN) 300 mg capsule take 1 capsule by mouth three times a day    dulaglutide (TRULICITY) 1.5 IV/5.7 mL sub-q pen 0.5 mL by SubCUTAneous route every seven (7) days. Stop Januvia    metFORMIN (GLUCOPHAGE) 1,000 mg tablet Take 1 Tab by mouth two (2) times daily (with meals). Stop Glipizide (Patient taking differently: Take 500 mg by mouth two (2) times daily (with meals).  Stop Glipizide)    lancets (TRUEPLUS LANCETS) 33 gauge misc Test blood glucose twice daily Dx Code: E11.65    glucose blood VI test strips (TRUE METRIX GLUCOSE TEST STRIP) strip Test blood glucose twice daily Dx Code: E11.65    Blood-Glucose Meter (TRUE METRIX AIR GLUCOSE METER) monitoring kit Test blood glucose twice daily Dx Code: E11.65    atorvastatin (LIPITOR) 20 mg tablet Take 20 mg by mouth daily.  nitroglycerin (NITROSTAT) 0.4 mg SL tablet 0.4 mg by SubLINGual route every five (5) minutes as needed for Chest Pain.  metoprolol succinate (TOPROL-XL) 25 mg XL tablet Take 6.25 mg by mouth daily.  lisinopril (PRINIVIL, ZESTRIL) 10 mg tablet Take 10 mg by mouth daily. No current facility-administered medications for this visit. No Known Allergies      Review of Systems:  - Constitutional Symptoms: no fevers, no chills, no weight loss  - Eyes: no blurry vision no double vision  - Musculoskeletal: + joint pains +  weakness  - Integumentary: no rashes  - Neurological: + numbness, tingling, no  headaches  - Psychiatric: no depression no  anxiety  - Endocrine: no heat or cold intolerance    Physical Examination:   Blood pressure 133/65, pulse (!) 49, temperature 98.4 °F (36.9 °C), temperature source Oral, resp. rate 14, height 5' 11\" (1.803 m), weight 268 lb 12.8 oz (121.9 kg), SpO2 99 %. Estimated body mass index is 37.49 kg/(m^2) as calculated from the following:    Height as of this encounter: 5' 11\" (1.803 m). -   Weight as of this encounter: 268 lb 12.8 oz (121.9 kg).   - General: pleasant, no distress, good eye contact  - HEENT: no pallor, no periorbital edema, EOMI  - Neck: supple, no thyromegaly, no nodules  - Cardiovascular: regular, normal rate, normal S1 and S2,  - Respiratory: clear to auscultation bilaterally  - Gastrointestinal: soft, nontender, nondistended,  BS +  - Musculoskeletal:  no edema,   - Neurological: alert and oriented  - Psychiatric: normal mood and affect  - Skin: color, texture, turgor normal.     Diabetic foot exam: January 2018    Left: Skin - xerosis   Vibratory sensation absent    Filament test absent sensation with micro filament   Pulse DP: 1+ (weak)    Deformities: onycomycosis    Right: Skin -  xerosis   Vibratory sensation absent   Filament test absent sensation with micro filament   Pulse DP: 2+ (normal)              Deformities: onychomycosis    Decreased sensation , onychomycosis    Data Reviewed:     [] Glucose records reviewed. [] See glucose records for details (to be scanned). [] A1C  [] Reviewed labs        Assessment/Plan:     1. Type 2 diabetes mellitus with diabetic neuropathy, without long-term current use of insulin ([facility] [address]    2. Essential hypertension with goal blood pressure less than 140/90    3. Hyperlipidemia, mixed        1. Type 2 Diabetes Mellitus , neuropathy   Lab Results   Component Value Date/Time    Hemoglobin A1c 5.9 (H) 05/11/2018 11:15 AM    Hemoglobin A1c (POC) 5.6 01/12/2018 09:55 AM   Controlled   continue Metformin, stopped Actos. Decrease Trulicity    Advised against using Lantus intermittently, discussed risks of hypoglycemia    Advised to check glucose once daily  FLU annually ,Pneumovax ,aspirin daily,annual eye exam,microalbumin    2. HTN : Continue current therapy       3. Hyperlipidemia : Continue statin. 4.Obesity:Body mass index is 37.49 kg/(m^2). Discussed about the importance of exercise and carbohydrate portion control. 5. Bradycardia, asymptomatic - managed by Cardiology - on B blockers       Peripheral neuropathy: Foot care    Follow-up Disposition: Not on File    Thank you for allowing me to participate in the care of this patient.     Saskia Joseph MD

## 2018-05-18 NOTE — PROGRESS NOTES
Garry Feldman is a 47 y.o. female here for   Chief Complaint   Patient presents with    Diabetes     Has cataract surgery on L eye 6/11/18 and another eye surgery on R eye 6/25/18    Functional glucose monitor and record keeping system? - yes  Eye exam within last year? - on file  Foot exam within last year? - on file    1. Have you been to the ER, urgent care clinic since your last visit? Hospitalized since your last visit? -[facility] last month for trouble with left eye    2. Have you seen or consulted any other health care providers outside of the [address] [place] [address] since your last visit? Include any pap smears or colon screening. -PCP      Lab Results   Component Value Date/Time    Hemoglobin A1c 5.9 (H) 05/11/2018 11:15 AM    Hemoglobin A1c (POC) 5.6 01/12/2018 09:55 AM       Wt Readings from Last 3 Encounters:   01/12/18 282 lb 11.2 oz (128.2 kg)   09/12/17 300 lb 14.4 oz (136.5 kg)   06/09/17 313 lb 14.4 oz (142.4 kg)     Temp Readings from Last 3 Encounters:   01/12/18 98 °F (36.7 °C) (Oral)   09/12/17 97.3 °F (36.3 °C) (Oral)   06/09/17 97.7 °F (36.5 °C) (Oral)     BP Readings from Last 3 Encounters:   01/12/18 125/65   09/12/17 112/59   06/09/17 128/62     Pulse Readings from Last 3 Encounters:   01/12/18 (!) 46   09/12/17 (!) 53   06/09/17 (!) 50

## 2018-05-18 NOTE — MR AVS SNAPSHOT
[address] [facility] [address] 
[phone] Patient: Rochelle Tran MRN: [id number] :1965 Visit Information Date & Time Provider Department Dept. Phone Encounter #  
 2018  8:45 AM Karen Cain MD [facility] [phone] [id number] Follow-up Instructions Return in about 5 months (around 10/18/2018). Upcoming Health Maintenance Date Due Pneumococcal 19-64 Medium Risk (1 of 1 - PPSV23) 1985 DTaP/Tdap/Td series (1 - Tdap) 1987 PAP AKA CERVICAL CYTOLOGY 1987 BREAST CANCER SCRN MAMMOGRAM 2016 FOBT Q 1 YEAR AGE 50-75 2016 MEDICARE YEARLY EXAM 3/14/2018 Influenza Age 5 to Adult 2018 HEMOGLOBIN A1C Q6M 2018 FOOT EXAM Q1 2019 EYE EXAM RETINAL OR DILATED Q1 3/28/2019 MICROALBUMIN Q1 2019 LIPID PANEL Q1 2019 Allergies as of 2018  Review Complete On: 2018 By: Mati Chang LPN No Known Allergies Current Immunizations  Never Reviewed No immunizations on file. Not reviewed this visit You Were Diagnosed With   
  
 Codes Comments Type 2 diabetes mellitus with diabetic neuropathy, without long-term current use of insulin (HCC)    -  Primary ICD-10-CM: E11.40 ICD-9-CM: 250.60, 357.2 Essential hypertension with goal blood pressure less than 140/90     ICD-10-CM: I10 
ICD-9-CM: 401.9 Hyperlipidemia, mixed     ICD-10-CM: E78.2 ICD-9-CM: 272.2 Vitals BP Pulse Temp Resp Height(growth percentile) Weight(growth percentile) 133/65 (BP 1 Location: Left arm, BP Patient Position: Sitting) (!) 49 98.4 °F (36.9 °C) (Oral) 14 5' 11\" (1.803 m) 268 lb 12.8 oz (121.9 kg) SpO2 BMI OB Status Smoking Status 99% 37.49 kg/m2 Ablation Former Smoker Vitals History BMI and BSA Data Body Mass Index Body Surface Area 37.49 kg/m 2 2.47 m 2 Preferred Pharmacy Pharmacy Name Phone [id number] [facility] [phone] Your Updated Medication List  
  
   
This list is accurate as of 5/18/18  9:16 AM.  Always use your most recent med list.  
  
  
  
  
 aspirin delayed-release 81 mg tablet Take 81 mg by mouth daily. atorvastatin 20 mg tablet Commonly known as:  LIPITOR Take 20 mg by mouth daily. Blood-Glucose Meter monitoring kit Commonly known as:  TRUE METRIX AIR GLUCOSE METER Test blood glucose twice daily Dx Code: E11.65  
  
 calcium-cholecalciferol (D3) tablet Commonly known as:  CALTRATE 600+D Take 1 Tab by mouth daily. dulaglutide 1.5 mg/0.5 mL sub-q pen Commonly known as:  TRULICITY  
0.5 mL by SubCUTAneous route every seven (7) days. Stop Januvia  
  
 gabapentin 300 mg capsule Commonly known as:  NEURONTIN  
take 1 capsule by mouth three times a day  
  
 glucose blood VI test strips strip Commonly known as:  TRUE METRIX GLUCOSE TEST STRIP Test blood glucose twice daily Dx Code: E11.65 Insulin Needles (Disposable) 32 gauge x 5/32\" Ndle Commonly known as:  Verónica Pen Needle Use to inject insulin daily Dx Code: E11.65  
  
 lancets 33 gauge Misc Commonly known as:  Mango Noon Test blood glucose twice daily Dx Code: E11.65  
  
 lisinopril 10 mg tablet Commonly known as:  Karn Desanctis Take 10 mg by mouth daily. metFORMIN 1,000 mg tablet Commonly known as:  GLUCOPHAGE Take 1 Tab by mouth two (2) times daily (with meals). Stop Glipizide  
  
 metoprolol succinate 25 mg XL tablet Commonly known as:  TOPROL-XL Take 6.25 mg by mouth daily. nitroglycerin 0.4 mg SL tablet Commonly known as:  NITROSTAT  
0.4 mg by SubLINGual route every five (5) minutes as needed for Chest Pain. oxybutynin chloride XL 10 mg CR tablet Commonly known as:  DITROPAN XL  
 Take 1 Tab by mouth daily. Follow-up Instructions Return in about 5 months (around 10/18/2018). Patient Instructions Please remember to bring your meter and/or log to every visit. Also, be sure to have a dilated diabetic eye exam done annually. Refills -Please call your pharmacy and have them send us a refill request. 
Results - Allow up to a week for lab results to be processed and reviewed. Phone calls - Allow up to 24 hours for non-urgent calls to be returned. Prior Authorization - May take up to 2 weeks to process depending on your insurance. Forms - FMLA, DMV, Patient Assistance, etc. will take up to 2 weeks to process. Cancellations - Please notify the office in advance if you cannot keep your appointment. Samples - Will only be dispensed at visits as supplies are limited. If you are having a medical emergency, call 911. Introducing [facility]! [facility] introduces Chroma patient portal. Now you can access parts of your medical record, email your doctor's office, and request medication refills online. 1. In your internet browser, go to [email or web address] 2. Click on the First Time User? Click Here link in the Sign In box. You will see the New Member Sign Up page. 3. Enter your Chroma Access Code exactly as it appears below. You will not need to use this code after youve completed the sign-up process. If you do not sign up before the expiration date, you must request a new code. · Chroma Access Code: [email or web address] Expires: 8/16/2018  9:16 AM 
 
4. Enter the last four digits of your Social Security Number (xxxx) and Date of Birth (mm/dd/yyyy) as indicated and click Submit. You will be taken to the next sign-up page. 5. Create a Chroma ID. This will be your Chroma login ID and cannot be changed, so think of one that is secure and easy to remember. 6. Create a TapShield password. You can change your password at any time. 7. Enter your Password Reset Question and Answer. This can be used at a later time if you forget your password. 8. Enter your e-mail address. You will receive e-mail notification when new information is available in [address] 9. Click Sign Up. You can now view and download portions of your medical record. 10. Click the Download Summary menu link to download a portable copy of your medical information. If you have questions, please visit the Frequently Asked Questions section of the TapShield website. Remember, TapShield is NOT to be used for urgent needs. For medical emergencies, dial 911. Now available from your iPhone and Android! Please provide this summary of care documentation to your next provider. Your primary care clinician is listed as Heidi Robert. If you have any questions after today's visit, please call [phone].

## 2018-05-21 ENCOUNTER — TELEPHONE (OUTPATIENT)
Dept: ENDOCRINOLOGY | Age: 53
End: 2018-05-21

## 2018-06-30 ENCOUNTER — ED HISTORICAL/CONVERTED ENCOUNTER (OUTPATIENT)
Dept: OTHER | Age: 53
End: 2018-06-30

## 2018-09-08 DIAGNOSIS — Z79.4 TYPE 2 DIABETES MELLITUS WITH HYPERGLYCEMIA, WITH LONG-TERM CURRENT USE OF INSULIN (HCC): ICD-10-CM

## 2018-09-08 DIAGNOSIS — E11.65 TYPE 2 DIABETES MELLITUS WITH HYPERGLYCEMIA, WITH LONG-TERM CURRENT USE OF INSULIN (HCC): ICD-10-CM

## 2018-09-09 RX ORDER — CALCIUM CITRATE/VITAMIN D3 200MG-6.25
TABLET ORAL
Qty: 200 STRIP | Refills: 0 | Status: SHIPPED | OUTPATIENT
Start: 2018-09-09 | End: 2018-12-07 | Stop reason: SDUPTHER

## 2018-10-13 DIAGNOSIS — E11.65 TYPE 2 DIABETES MELLITUS WITH HYPERGLYCEMIA, WITH LONG-TERM CURRENT USE OF INSULIN (HCC): ICD-10-CM

## 2018-10-13 DIAGNOSIS — Z79.4 TYPE 2 DIABETES MELLITUS WITH HYPERGLYCEMIA, WITH LONG-TERM CURRENT USE OF INSULIN (HCC): ICD-10-CM

## 2018-10-13 RX ORDER — DULAGLUTIDE 0.75 MG/.5ML
INJECTION, SOLUTION SUBCUTANEOUS
Qty: 2 ML | Refills: 0 | Status: SHIPPED | OUTPATIENT
Start: 2018-10-13 | End: 2019-02-14

## 2018-10-19 ENCOUNTER — OFFICE VISIT (OUTPATIENT)
Dept: ENDOCRINOLOGY | Age: 53
End: 2018-10-19

## 2018-10-19 VITALS
RESPIRATION RATE: 16 BRPM | WEIGHT: 256.4 LBS | SYSTOLIC BLOOD PRESSURE: 115 MMHG | BODY MASS INDEX: 35.9 KG/M2 | HEART RATE: 53 BPM | OXYGEN SATURATION: 99 % | TEMPERATURE: 97.8 F | DIASTOLIC BLOOD PRESSURE: 58 MMHG | HEIGHT: 71 IN

## 2018-10-19 DIAGNOSIS — E11.65 UNCONTROLLED TYPE 2 DIABETES MELLITUS WITH HYPERGLYCEMIA, WITH LONG-TERM CURRENT USE OF INSULIN (HCC): ICD-10-CM

## 2018-10-19 DIAGNOSIS — Z79.4 UNCONTROLLED TYPE 2 DIABETES MELLITUS WITH HYPERGLYCEMIA, WITH LONG-TERM CURRENT USE OF INSULIN (HCC): ICD-10-CM

## 2018-10-19 DIAGNOSIS — E78.2 HYPERLIPIDEMIA, MIXED: ICD-10-CM

## 2018-10-19 DIAGNOSIS — E11.40 TYPE 2 DIABETES MELLITUS WITH DIABETIC NEUROPATHY, WITHOUT LONG-TERM CURRENT USE OF INSULIN (HCC): Primary | ICD-10-CM

## 2018-10-19 DIAGNOSIS — I10 ESSENTIAL HYPERTENSION WITH GOAL BLOOD PRESSURE LESS THAN 140/90: ICD-10-CM

## 2018-10-19 LAB
GLUCOSE POC: 104 MG/DL
HBA1C MFR BLD HPLC: 5.4 %

## 2018-10-19 NOTE — PROGRESS NOTES
Alcide Cockayne ,MD        Patient Information  Date:10/19/2018  Name : Megan Orozco 47 y.o.     YOB: 1965         Referred by: Edie Cuenca NP         Chief Complaint   Patient presents with    Diabetes    Cholesterol Problem    Hypertension       History of Present Illness: Megan Orozco is a 47 y.o. female here for fu of  Type 2 Diabetes Mellitus. She was referred by Edie Cuenca NP for evaluation and management of diabetes. Doing well on Trulicity   Lost weight   No nausea, vomiting  Decreased metformin  She is compliant with the medications, commended on the weight loss      She was diagnosed with type 2 diabetes 30 years ago,         Wt Readings from Last 3 Encounters:   10/19/18 256 lb 6.4 oz (116.3 kg)   05/18/18 268 lb 12.8 oz (121.9 kg)   01/12/18 282 lb 11.2 oz (128.2 kg)       BP Readings from Last 3 Encounters:   10/19/18 115/58   05/18/18 133/65   01/12/18 125/65           Past Medical History:   Diagnosis Date    CAD (coronary artery disease)     [facility]     Hypertension      Current Outpatient Medications   Medication Sig    TRULICITY 8.81 GB/9.8 mL sub-q pen INJECT 0.5 ML UNDER THE SKIN EVERY 7 DAYS; STOP TRULICITY 1.5 MG    TRUE METRIX GLUCOSE TEST STRIP strip TEST BLOOD GLUCOSE TWICE DAILY    aspirin delayed-release 81 mg tablet Take 81 mg by mouth daily.  Blood-Glucose Meter (TRUE METRIX AIR GLUCOSE METER) monitoring kit Test blood glucose twice daily Dx Code: E11.65    lancets (TRUEPLUS LANCETS) 33 gauge misc Test blood glucose twice daily Dx Code: E11.65    oxybutynin chloride XL (DITROPAN XL) 10 mg CR tablet Take 1 Tab by mouth daily.     Insulin Needles, Disposable, (CARLIE PEN NEEDLE) 32 gauge x 5/32\" ndle Use to inject insulin daily Dx Code: E11.65    gabapentin (NEURONTIN) 300 mg capsule take 1 capsule by mouth three times a day    metFORMIN (GLUCOPHAGE) 1,000 mg tablet Take 1 Tab by mouth two (2) times daily (with meals). Stop Glipizide (Patient taking differently: Take 500 mg by mouth two (2) times daily (with meals). Stop Glipizide)    atorvastatin (LIPITOR) 20 mg tablet Take 20 mg by mouth daily.  metoprolol succinate (TOPROL-XL) 25 mg XL tablet Take 6.25 mg by mouth daily.  calcium-cholecalciferol, D3, (CALTRATE 600+D) tablet Take 1 Tab by mouth daily.  lisinopril (PRINIVIL, ZESTRIL) 10 mg tablet Take 10 mg by mouth daily.  nitroglycerin (NITROSTAT) 0.4 mg SL tablet 0.4 mg by SubLINGual route every five (5) minutes as needed for Chest Pain. No current facility-administered medications for this visit. No Known Allergies      Review of Systems:  - Constitutional Symptoms: no fevers, no chills, no weight loss  - Eyes: no blurry vision no double vision  - Musculoskeletal: No joint pains +  weakness  - Integumentary: no rashes  - Neurological: + numbness, tingling, no  headaches  - Psychiatric: no depression no  anxiety  - Endocrine: no heat or cold intolerance    Physical Examination:   Blood pressure 115/58, pulse (!) 53, temperature 97.8 °F (36.6 °C), temperature source Oral, resp. rate 16, height 5' 11\" (1.803 m), weight 256 lb 6.4 oz (116.3 kg), SpO2 99 %. Estimated body mass index is 35.76 kg/m² as calculated from the following:    Height as of this encounter: 5' 11\" (1.803 m). -   Weight as of this encounter: 256 lb 6.4 oz (116.3 kg).   - General: pleasant, no distress, good eye contact  - HEENT: no pallor, no periorbital edema, EOMI  - Neck: supple, no thyromegaly, no nodules  - Cardiovascular: regular, normal rate, normal S1 and S2,  - Respiratory: clear to auscultation bilaterally  - Gastrointestinal: soft, nontender, nondistended,  BS +  - Musculoskeletal:  no edema,   - Neurological: alert and oriented  - Psychiatric: normal mood and affect  - Skin: color, texture, turgor normal.     Diabetic foot exam: January 2018    Left: Skin - xerosis   Vibratory sensation absent    Filament test absent sensation with micro filament   Pulse DP: 1+ (weak)    Deformities: onycomycosis    Right: Skin -  xerosis   Vibratory sensation absent   Filament test absent sensation with micro filament   Pulse DP: 2+ (normal)              Deformities: onychomycosis    Decreased sensation , onychomycosis    Data Reviewed:     Lab Results   Component Value Date/Time    Hemoglobin A1c 5.9 (H) 05/11/2018 11:15 AM    Hemoglobin A1c 5.8 (H) 09/06/2017 01:32 PM    Hemoglobin A1c 7.9 (H) 03/01/2017 09:01 AM    Glucose 82 05/11/2018 11:15 AM    Glucose  10/19/2018 10:10 AM    Microalb/Creat ratio (ug/mg creat.) 1.2 05/11/2018 11:15 AM    LDL, calculated 50 05/11/2018 11:15 AM    Creatinine 0.91 05/11/2018 11:15 AM      Lab Results   Component Value Date/Time    Cholesterol, total 97 (L) 05/11/2018 11:15 AM    HDL Cholesterol 35 (L) 05/11/2018 11:15 AM    LDL, calculated 50 05/11/2018 11:15 AM    LDL-C, External 70 10/14/2015    Triglyceride 61 05/11/2018 11:15 AM     Lab Results   Component Value Date/Time    GFR est non-AA 73 05/11/2018 11:15 AM    GFR est AA 84 05/11/2018 11:15 AM    Creatinine 0.91 05/11/2018 11:15 AM    BUN 13 05/11/2018 11:15 AM    Sodium 143 05/11/2018 11:15 AM    Potassium 4.4 05/11/2018 11:15 AM    Chloride 103 05/11/2018 11:15 AM    CO2 27 05/11/2018 11:15 AM         Assessment/Plan:     1. Type 2 diabetes mellitus with diabetic neuropathy, without long-term current use of insulin ([address]    2. Essential hypertension with goal blood pressure less than 140/90    3. Hyperlipidemia, mixed        1. Type 2 Diabetes Mellitus , neuropathy   Lab Results   Component Value Date/Time    Hemoglobin A1c 5.9 (H) 05/11/2018 11:15 AM    Hemoglobin A1c (POC) 5.4 10/19/2018 10:10 AM   Controlled   continue Metformin, stopped Actos.     Hold Trulicity and monitor blood glucose    Off lantus     Advised to check glucose once daily  FLU annually ,Pneumovax ,aspirin daily,annual eye exam,microalbumin    2. HTN : Continue current therapy       3. Hyperlipidemia : Continue statin. 4.Obesity:Body mass index is 35.76 kg/m². Discussed about the importance of exercise and carbohydrate portion control. 5. Bradycardia, asymptomatic - managed by Cardiology - on B blockers       Peripheral neuropathy: Foot care    Follow-up Disposition: Not on File    Thank you for allowing me to participate in the care of this patient.     Leland Serna MD    Patient verbalized understanding

## 2018-10-19 NOTE — PROGRESS NOTES
Luba Lai is a 47 y.o. female here for   Chief Complaint   Patient presents with    Diabetes    Cholesterol Problem    Hypertension       Functional glucose monitor and record keeping system? - yes  Eye exam within last year? - on file  Foot exam within last year? - on file    1. Have you been to the ER, urgent care clinic since your last visit? Hospitalized since your last visit? - [facility] on Monday for [facility] pain/back pain    2. Have you seen or consulted any other health care providers outside of the [address] [place] [address] since your last visit?   Include any pap smears or colon screening.-no

## 2018-11-28 ENCOUNTER — ED HISTORICAL/CONVERTED ENCOUNTER (OUTPATIENT)
Dept: OTHER | Age: 53
End: 2018-11-28

## 2018-12-07 DIAGNOSIS — Z79.4 TYPE 2 DIABETES MELLITUS WITH HYPERGLYCEMIA, WITH LONG-TERM CURRENT USE OF INSULIN (HCC): ICD-10-CM

## 2018-12-07 DIAGNOSIS — E11.65 TYPE 2 DIABETES MELLITUS WITH HYPERGLYCEMIA, WITH LONG-TERM CURRENT USE OF INSULIN (HCC): ICD-10-CM

## 2018-12-23 ENCOUNTER — ED HISTORICAL/CONVERTED ENCOUNTER (OUTPATIENT)
Dept: OTHER | Age: 53
End: 2018-12-23

## 2019-02-14 ENCOUNTER — OFFICE VISIT (OUTPATIENT)
Dept: ENDOCRINOLOGY | Age: 54
End: 2019-02-14

## 2019-02-14 VITALS
RESPIRATION RATE: 16 BRPM | OXYGEN SATURATION: 99 % | TEMPERATURE: 98 F | HEIGHT: 71 IN | HEART RATE: 47 BPM | WEIGHT: 256 LBS | SYSTOLIC BLOOD PRESSURE: 118 MMHG | DIASTOLIC BLOOD PRESSURE: 57 MMHG | BODY MASS INDEX: 35.84 KG/M2

## 2019-02-14 DIAGNOSIS — E11.40 TYPE 2 DIABETES MELLITUS WITH DIABETIC NEUROPATHY, WITHOUT LONG-TERM CURRENT USE OF INSULIN (HCC): Primary | ICD-10-CM

## 2019-02-14 DIAGNOSIS — E78.2 HYPERLIPIDEMIA, MIXED: ICD-10-CM

## 2019-02-14 DIAGNOSIS — I10 ESSENTIAL HYPERTENSION WITH GOAL BLOOD PRESSURE LESS THAN 140/90: ICD-10-CM

## 2019-02-14 DIAGNOSIS — E11.65 TYPE 2 DIABETES MELLITUS WITH HYPERGLYCEMIA, WITH LONG-TERM CURRENT USE OF INSULIN (HCC): ICD-10-CM

## 2019-02-14 DIAGNOSIS — Z79.4 TYPE 2 DIABETES MELLITUS WITH HYPERGLYCEMIA, WITH LONG-TERM CURRENT USE OF INSULIN (HCC): ICD-10-CM

## 2019-02-14 RX ORDER — LANCETS 33 GAUGE
EACH MISCELLANEOUS
Qty: 200 LANCET | Refills: 3 | Status: SHIPPED | OUTPATIENT
Start: 2019-02-14 | End: 2020-09-25 | Stop reason: SDUPTHER

## 2019-02-14 NOTE — PROGRESS NOTES
Lourdes Vuong is a 47 y.o. female here for   Chief Complaint   Patient presents with    Diabetes       Functional glucose monitor and record keeping system? -yes  Eye exam within last year? - on file  Foot exam within last year? - due    1. Have you been to the ER, urgent care clinic since your last visit? Hospitalized since your last visit? -no    2. Have you seen or consulted any other health care providers outside of the [address] [place] [address] since your last visit? Include any pap smears or colon screening. -PCP

## 2019-02-14 NOTE — PROGRESS NOTES
Ava Vidal MD        Patient Information  Date:2/14/2019  Name : Rochelle Tran 47 y.o.     YOB: 1965         Referred by: Helen Coats NP         Chief Complaint   Patient presents with    Diabetes       History of Present Illness: Rochelle Tran is a 47 y.o. female here for fu of  Type 2 Diabetes Mellitus. She was referred by Helen Coats NP for evaluation and management of diabetes. She is compliant with the medications  Weight has been stable      She was diagnosed with type 2 diabetes 30 years ago,         Wt Readings from Last 3 Encounters:   02/14/19 256 lb (116.1 kg)   10/19/18 256 lb 6.4 oz (116.3 kg)   05/18/18 268 lb 12.8 oz (121.9 kg)       BP Readings from Last 3 Encounters:   02/14/19 118/57   10/19/18 115/58   05/18/18 133/65           Past Medical History:   Diagnosis Date    CAD (coronary artery disease)     Dr [facility])     Hypertension      Current Outpatient Medications   Medication Sig    ONETOUCH ULTRA BLUE TEST STRIP strip TEST BLOOD GLUCOSE TWICE DAILY    aspirin delayed-release 81 mg tablet Take 81 mg by mouth daily.  Blood-Glucose Meter (TRUE METRIX AIR GLUCOSE METER) monitoring kit Test blood glucose twice daily Dx Code: E11.65    lancets (TRUEPLUS LANCETS) 33 gauge misc Test blood glucose twice daily Dx Code: E11.65    oxybutynin chloride XL (DITROPAN XL) 10 mg CR tablet Take 1 Tab by mouth daily. (Patient taking differently: Take 15 mg by mouth daily.)    Insulin Needles, Disposable, (CARLIE PEN NEEDLE) 32 gauge x 5/32\" ndle Use to inject insulin daily Dx Code: E11.65    gabapentin (NEURONTIN) 300 mg capsule take 1 capsule by mouth three times a day    metFORMIN (GLUCOPHAGE) 1,000 mg tablet Take 1 Tab by mouth two (2) times daily (with meals). Stop Glipizide    atorvastatin (LIPITOR) 20 mg tablet Take 20 mg by mouth daily.     metoprolol succinate (TOPROL-XL) 25 mg XL tablet Take 6.25 mg by mouth daily.  TRULICITY 5.96 GF/0.0 mL sub-q pen INJECT 0.5 ML UNDER THE SKIN EVERY 7 DAYS; STOP TRULICITY 1.5 MG    lisinopril (PRINIVIL, ZESTRIL) 10 mg tablet Take 10 mg by mouth daily. No current facility-administered medications for this visit. No Known Allergies      Review of Systems:  - Constitutional Symptoms: no fevers, no chills, no weight loss  - Eyes: no blurry vision no double vision  - Musculoskeletal: No joint pains +  weakness  - Integumentary: no rashes  - Neurological: + numbness, tingling, no  headaches  - Psychiatric: no depression no  anxiety  - Endocrine: no heat or cold intolerance    Physical Examination:   Blood pressure 118/57, pulse (!) 47, temperature 98 °F (36.7 °C), temperature source Oral, resp. rate 16, height 5' 11\" (1.803 m), weight 256 lb (116.1 kg), SpO2 99 %. Estimated body mass index is 35.7 kg/m² as calculated from the following:    Height as of this encounter: 5' 11\" (1.803 m). -   Weight as of this encounter: 256 lb (116.1 kg).   - General: pleasant, no distress, good eye contact  - HEENT: no pallor, no periorbital edema, EOMI  - Neck: supple, no thyromegaly, no nodules  - Cardiovascular: regular, normal rate, normal S1 and S2,  - Respiratory: clear to auscultation bilaterally  - Gastrointestinal: soft, nontender, nondistended,  BS +  - Musculoskeletal:  no edema,   - Neurological: alert and oriented  - Psychiatric: normal mood and affect  - Skin: color, texture, turgor normal.     Diabetic foot exam: February 2019    Left: Skin -  xerosis   Vibratory sensation absent    Filament test absent sensation with micro filament   Pulse DP: 1+ (weak)    Deformities: onycomycosis    Right: Skin -  xerosis   Vibratory sensation absent   Filament test absent sensation with micro filament   Pulse DP: 2+ (normal)              Deformities: onychomycosis    Decreased sensation , onychomycosis    Data Reviewed:     Lab Results   Component Value Date/Time    Hemoglobin A1c 6.9 (H) 02/07/2019 08:58 AM    Hemoglobin A1c 5.9 (H) 05/11/2018 11:15 AM    Hemoglobin A1c 5.8 (H) 09/06/2017 01:32 PM    Glucose 102 (H) 02/07/2019 08:58 AM    Glucose  10/19/2018 10:10 AM    Microalb/Creat ratio (ug/mg creat.) 3.2 02/07/2019 08:58 AM    LDL, calculated 52 02/07/2019 08:58 AM    Creatinine 0.94 02/07/2019 08:58 AM      Lab Results   Component Value Date/Time    Cholesterol, total 103 02/07/2019 08:58 AM    HDL Cholesterol 38 (L) 02/07/2019 08:58 AM    LDL, calculated 52 02/07/2019 08:58 AM    LDL-C, External 70 10/14/2015    Triglyceride 64 02/07/2019 08:58 AM     Lab Results   Component Value Date/Time    GFR est non-AA 70 02/07/2019 08:58 AM    GFR est AA 81 02/07/2019 08:58 AM    Creatinine 0.94 02/07/2019 08:58 AM    BUN 10 02/07/2019 08:58 AM    Sodium 140 02/07/2019 08:58 AM    Potassium 4.5 02/07/2019 08:58 AM    Chloride 101 02/07/2019 08:58 AM    CO2 26 02/07/2019 08:58 AM         Assessment/Plan:     1. Type 2 diabetes mellitus with diabetic neuropathy, without long-term current use of insulin ([facility] [address]    2. Essential hypertension with goal blood pressure less than 140/90    3. Hyperlipidemia, mixed        1. Type 2 Diabetes Mellitus , neuropathy   Lab Results   Component Value Date/Time    Hemoglobin A1c 6.9 (H) 02/07/2019 08:58 AM    Hemoglobin A1c (POC) 5.4 10/19/2018 10:10 AM   Controlled   continue Metformin, stopped Actos. Off lantus ,, off Trulicity    Advised to check glucose once daily  FLU annually ,Pneumovax ,aspirin daily,annual eye exam,microalbumin    2. HTN : Continue current therapy       3. Hyperlipidemia : Continue statin. 4.Obesity:Body mass index is 35.7 kg/m². Discussed about the importance of exercise and carbohydrate portion control.        5. Bradycardia, asymptomatic - managed by Cardiology - on B blockers       Peripheral neuropathy: Foot care    Follow-up Disposition: Not on File    Thank you for allowing me to participate in the care of this patient.     Guillermo Tapia MD    Patient verbalized understanding

## 2019-03-17 ENCOUNTER — ED HISTORICAL/CONVERTED ENCOUNTER (OUTPATIENT)
Dept: OTHER | Age: 54
End: 2019-03-17

## 2019-07-11 ENCOUNTER — ED HISTORICAL/CONVERTED ENCOUNTER (OUTPATIENT)
Dept: OTHER | Age: 54
End: 2019-07-11

## 2019-08-05 ENCOUNTER — OFFICE VISIT (OUTPATIENT)
Dept: ENDOCRINOLOGY | Age: 54
End: 2019-08-05

## 2019-08-05 VITALS
BODY MASS INDEX: 34.72 KG/M2 | DIASTOLIC BLOOD PRESSURE: 56 MMHG | RESPIRATION RATE: 14 BRPM | HEIGHT: 71 IN | HEART RATE: 48 BPM | SYSTOLIC BLOOD PRESSURE: 124 MMHG | WEIGHT: 248 LBS | TEMPERATURE: 97.7 F | OXYGEN SATURATION: 100 %

## 2019-08-05 DIAGNOSIS — E11.40 TYPE 2 DIABETES MELLITUS WITH DIABETIC NEUROPATHY, WITHOUT LONG-TERM CURRENT USE OF INSULIN (HCC): Primary | ICD-10-CM

## 2019-08-05 DIAGNOSIS — I10 ESSENTIAL HYPERTENSION WITH GOAL BLOOD PRESSURE LESS THAN 140/90: ICD-10-CM

## 2019-08-05 DIAGNOSIS — E78.2 HYPERLIPIDEMIA, MIXED: ICD-10-CM

## 2019-08-05 RX ORDER — OXYBUTYNIN CHLORIDE 5 MG/1
5 TABLET, EXTENDED RELEASE ORAL DAILY
Qty: 30 TAB | Refills: 5 | Status: SHIPPED | OUTPATIENT
Start: 2019-08-05 | End: 2021-11-16 | Stop reason: ALTCHOICE

## 2019-08-05 NOTE — PROGRESS NOTES
Lani Moritz ,MD        Patient Information  Date:8/5/2019  Name : Kiley Early 47 y.o.     YOB: 1965         Referred by: Cinthya Mane MD         Chief Complaint   Patient presents with    Diabetes       History of Present Illness: Kiley Early is a 47 y.o. female here for fu of  Type 2 Diabetes Mellitus. She was referred by Cinthya Mane MD for evaluation and management of diabetes. No severe hypoglycemia  Lost weight     She is compliant with the medications        She was diagnosed with type 2 diabetes 30 years ago,         Wt Readings from Last 3 Encounters:   08/05/19 248 lb (112.5 kg)   02/14/19 256 lb (116.1 kg)   10/19/18 256 lb 6.4 oz (116.3 kg)       BP Readings from Last 3 Encounters:   08/05/19 124/56   02/14/19 118/57   10/19/18 115/58           Past Medical History:   Diagnosis Date    CAD (coronary artery disease)     Dr Nany H[facility])     Hypertension      Current Outpatient Medications   Medication Sig    lancets (TRUEPLUS LANCETS) 33 gauge misc Test blood glucose twice daily Dx Code: E11.65    glucose blood VI test strips (ONETOUCH ULTRA BLUE TEST STRIP) strip TEST BLOOD GLUCOSE TWICE DAILY Dx Code:E 11.65    aspirin delayed-release 81 mg tablet Take 81 mg by mouth daily.  Blood-Glucose Meter (TRUE METRIX AIR GLUCOSE METER) monitoring kit Test blood glucose twice daily Dx Code: E11.65    oxybutynin chloride XL (DITROPAN XL) 10 mg CR tablet Take 1 Tab by mouth daily. (Patient taking differently: Take 5 mg by mouth three (3) times daily.)    Insulin Needles, Disposable, (CARLIE PEN NEEDLE) 32 gauge x 5/32\" ndle Use to inject insulin daily Dx Code: E11.65    metFORMIN (GLUCOPHAGE) 1,000 mg tablet Take 1 Tab by mouth two (2) times daily (with meals). Stop Glipizide    atorvastatin (LIPITOR) 40 mg tablet Take 40 mg by mouth daily.     metoprolol succinate (TOPROL-XL) 25 mg XL tablet Take 12.5 mg by mouth daily.  gabapentin (NEURONTIN) 300 mg capsule take 1 capsule by mouth three times a day    lisinopril (PRINIVIL, ZESTRIL) 10 mg tablet Take 10 mg by mouth daily. No current facility-administered medications for this visit. No Known Allergies      Review of Systems:  - Constitutional Symptoms: no fevers, no chills, no weight loss  - Eyes: no blurry vision no double vision  - Musculoskeletal: No joint pains +  weakness  - Integumentary: no rashes  - Neurological: + numbness, tingling, no  headaches  - Psychiatric: no depression no  anxiety  - Endocrine: no heat or cold intolerance    Physical Examination:   Blood pressure 124/56, pulse (!) 48, temperature 97.7 °F (36.5 °C), temperature source Oral, resp. rate 14, height 5' 11\" (1.803 m), weight 248 lb (112.5 kg), SpO2 100 %. Estimated body mass index is 34.59 kg/m² as calculated from the following:    Height as of this encounter: 5' 11\" (1.803 m). -   Weight as of this encounter: 248 lb (112.5 kg).   - General: pleasant, no distress, good eye contact  - HEENT: no pallor, no periorbital edema, EOMI  - Neck: supple, no thyromegaly, no nodules  - Cardiovascular: regular, normal rate, normal S1 and S2,  - Respiratory: clear to auscultation bilaterally  - Gastrointestinal: soft, nontender, nondistended,  BS +  - Musculoskeletal:  no edema,   - Neurological: alert and oriented  - Psychiatric: normal mood and affect  - Skin: color, texture, turgor normal.     Diabetic foot exam: February 2019    Left: Skin -  xerosis   Vibratory sensation absent    Filament test absent sensation with micro filament   Pulse DP: 1+ (weak)    Deformities: onycomycosis    Right: Skin -  xerosis   Vibratory sensation absent   Filament test absent sensation with micro filament   Pulse DP: 2+ (normal)              Deformities: onychomycosis      Data Reviewed:     Lab Results   Component Value Date/Time    Hemoglobin A1c 6.9 (H) 02/07/2019 08:58 AM    Hemoglobin A1c 5.9 (H) 05/11/2018 11:15 AM    Hemoglobin A1c 5.8 (H) 09/06/2017 01:32 PM    Glucose 102 (H) 02/07/2019 08:58 AM    Glucose  10/19/2018 10:10 AM    Microalb/Creat ratio (ug/mg creat.) 3.2 02/07/2019 08:58 AM    LDL, calculated 52 02/07/2019 08:58 AM    Creatinine 0.94 02/07/2019 08:58 AM      Lab Results   Component Value Date/Time    Cholesterol, total 103 02/07/2019 08:58 AM    HDL Cholesterol 38 (L) 02/07/2019 08:58 AM    LDL, calculated 52 02/07/2019 08:58 AM    LDL-C, External 70 10/14/2015    Triglyceride 64 02/07/2019 08:58 AM     Lab Results   Component Value Date/Time    GFR est non-AA 70 02/07/2019 08:58 AM    GFR est AA 81 02/07/2019 08:58 AM    Creatinine 0.94 02/07/2019 08:58 AM    BUN 10 02/07/2019 08:58 AM    Sodium 140 02/07/2019 08:58 AM    Potassium 4.5 02/07/2019 08:58 AM    Chloride 101 02/07/2019 08:58 AM    CO2 26 02/07/2019 08:58 AM         Assessment/Plan:     1. Type 2 diabetes mellitus with diabetic neuropathy, without long-term current use of insulin ([facility] [address]    2. Essential hypertension with goal blood pressure less than 140/90    3. Hyperlipidemia, mixed        1. Type 2 Diabetes Mellitus , neuropathy   Lab Results   Component Value Date/Time    Hemoglobin A1c 6.9 (H) 02/07/2019 08:58 AM    Hemoglobin A1c (POC) 5.4 10/19/2018 10:10 AM   Controlled   continue Metformin, stopped Actos. Off lantus ,, off Trulicity    Advised to check glucose once daily  FLU annually ,Pneumovax ,aspirin daily,annual eye exam,microalbumin    2. HTN : Continue current therapy       3. Hyperlipidemia : Continue statin. 4.Obesity:Body mass index is 34.59 kg/m². Discussed about the importance of exercise and carbohydrate portion control. 5. Bradycardia, asymptomatic - managed by Cardiology - on B blockers       Peripheral neuropathy: Foot care        Thank you for allowing me to participate in the care of this patient.     Sherry Bee MD    Patient verbalized understanding

## 2019-08-05 NOTE — PATIENT INSTRUCTIONS
Magnesium citrate use one bottle  for constipation once , do not use more than 2 bottles     After that use Miralax 1 scoop daily

## 2019-08-05 NOTE — PROGRESS NOTES
Kendra Reza is a 47 y.o. female here for   Chief Complaint   Patient presents with    Diabetes       Functional glucose monitor and record keeping system? -yes   Eye exam within last year? - on file  Foot exam within last year? - on file    1. Have you been to the ER, urgent care clinic since your last visit? Hospitalized since your last visit? -[address] [place] [address] last month for STD    2. Have you seen or consulted any other health care providers outside of the [address] [place] [address] since your last visit? Include any pap smears or colon screening. -PCP

## 2019-08-05 NOTE — LETTER
8/8/19 Patient: Real Cabrera YOB: 1965 Date of Visit: 8/5/2019 Juanita Morales MD 
[address] [place] [address] VIA Facsimile: [phone] Dear Juanita Morales MD, Thank you for referring Ms. Lara Zapata to [facility] for evaluation. My notes for this consultation are attached. If you have questions, please do not hesitate to call me. I look forward to following your patient along with you. Sincerely, Yohan Connor MD

## 2019-08-09 DIAGNOSIS — E11.65 TYPE 2 DIABETES MELLITUS WITH HYPERGLYCEMIA, WITH LONG-TERM CURRENT USE OF INSULIN (HCC): ICD-10-CM

## 2019-08-09 DIAGNOSIS — Z79.4 TYPE 2 DIABETES MELLITUS WITH HYPERGLYCEMIA, WITH LONG-TERM CURRENT USE OF INSULIN (HCC): ICD-10-CM

## 2019-08-09 RX ORDER — BLOOD-GLUCOSE CONTROL, NORMAL
EACH MISCELLANEOUS
Qty: 100 LANCET | Refills: 5 | Status: SHIPPED | OUTPATIENT
Start: 2019-08-09 | End: 2020-09-25 | Stop reason: SDUPTHER

## 2019-12-05 ENCOUNTER — APPOINTMENT (OUTPATIENT)
Dept: ENDOCRINOLOGY | Age: 54
End: 2019-12-05

## 2019-12-05 DIAGNOSIS — E11.40 TYPE 2 DIABETES MELLITUS WITH DIABETIC NEUROPATHY, WITHOUT LONG-TERM CURRENT USE OF INSULIN (HCC): ICD-10-CM

## 2019-12-05 DIAGNOSIS — I10 ESSENTIAL HYPERTENSION WITH GOAL BLOOD PRESSURE LESS THAN 140/90: ICD-10-CM

## 2019-12-05 DIAGNOSIS — E78.2 HYPERLIPIDEMIA, MIXED: ICD-10-CM

## 2019-12-06 LAB
ALBUMIN SERPL-MCNC: 3.8 G/DL (ref 3.5–5.5)
ALBUMIN/CREAT UR: <1.5 MG/G CREAT (ref 0–30)
ALBUMIN/GLOB SERPL: 1.3 {RATIO} (ref 1.2–2.2)
ALP SERPL-CCNC: 84 IU/L (ref 39–117)
ALT SERPL-CCNC: 17 IU/L (ref 0–32)
AST SERPL-CCNC: 23 IU/L (ref 0–40)
BILIRUB SERPL-MCNC: 0.4 MG/DL (ref 0–1.2)
BUN SERPL-MCNC: 10 MG/DL (ref 6–24)
BUN/CREAT SERPL: 11 (ref 9–23)
CALCIUM SERPL-MCNC: 9.2 MG/DL (ref 8.7–10.2)
CHLORIDE SERPL-SCNC: 101 MMOL/L (ref 96–106)
CO2 SERPL-SCNC: 25 MMOL/L (ref 20–29)
CREAT SERPL-MCNC: 0.93 MG/DL (ref 0.57–1)
CREAT UR-MCNC: 202.8 MG/DL
EST. AVERAGE GLUCOSE BLD GHB EST-MCNC: 151 MG/DL
GLOBULIN SER CALC-MCNC: 3 G/DL (ref 1.5–4.5)
GLUCOSE SERPL-MCNC: 146 MG/DL (ref 65–99)
HBA1C MFR BLD: 6.9 % (ref 4.8–5.6)
MICROALBUMIN UR-MCNC: <3 UG/ML
POTASSIUM SERPL-SCNC: 4.4 MMOL/L (ref 3.5–5.2)
PROT SERPL-MCNC: 6.8 G/DL (ref 6–8.5)
SODIUM SERPL-SCNC: 143 MMOL/L (ref 134–144)

## 2019-12-11 NOTE — PROGRESS NOTES
Adriane Marcos is a 49 y.o. female here for No chief complaint on file. Functional glucose monitor and record keeping system? -   Eye exam within last year? - on file  Foot exam within last year? - on file    1. Have you been to the ER, urgent care clinic since your last visit? Hospitalized since your last visit? -    2. Have you seen or consulted any other health care providers outside of the [address] [place] [address] since your last visit?   Include any pap smears or colon screening.-

## 2019-12-12 ENCOUNTER — OFFICE VISIT (OUTPATIENT)
Dept: ENDOCRINOLOGY | Age: 54
End: 2019-12-12

## 2019-12-12 VITALS
HEIGHT: 71 IN | DIASTOLIC BLOOD PRESSURE: 66 MMHG | RESPIRATION RATE: 14 BRPM | WEIGHT: 258.8 LBS | OXYGEN SATURATION: 99 % | BODY MASS INDEX: 36.23 KG/M2 | HEART RATE: 45 BPM | TEMPERATURE: 98.7 F | SYSTOLIC BLOOD PRESSURE: 126 MMHG

## 2019-12-12 DIAGNOSIS — E11.40 TYPE 2 DIABETES MELLITUS WITH DIABETIC NEUROPATHY, WITHOUT LONG-TERM CURRENT USE OF INSULIN (HCC): Primary | ICD-10-CM

## 2019-12-12 DIAGNOSIS — I10 ESSENTIAL HYPERTENSION WITH GOAL BLOOD PRESSURE LESS THAN 140/90: ICD-10-CM

## 2019-12-12 DIAGNOSIS — E78.2 HYPERLIPIDEMIA, MIXED: ICD-10-CM

## 2019-12-12 RX ORDER — BUSPIRONE HYDROCHLORIDE 5 MG/1
TABLET ORAL
Refills: 0 | COMMUNITY
Start: 2019-11-05 | End: 2020-09-25

## 2019-12-12 NOTE — PROGRESS NOTES
Nash Macedo is a 49 y.o. female here for   Chief Complaint   Patient presents with    Diabetes       Functional glucose monitor and record keeping system? - not with her today  Eye exam within last year? - on file  Foot exam within last year? - on file    1. Have you been to the ER, urgent care clinic since your last visit? Hospitalized since your last visit? - no    2. Have you seen or consulted any other health care providers outside of the [address] [place] [address] since your last visit? Include any pap smears or colon screening. -PCP

## 2019-12-12 NOTE — LETTER
12/13/19 Patient: Frida Welsh YOB: 1965 Date of Visit: 12/12/2019 Anuel Gaines MD 
[address] [place] [address] VIA Facsimile: [phone] Dear Anuel Gaines MD, Thank you for referring Ms. Lara Zapata to [facility] for evaluation. My notes for this consultation are attached. If you have questions, please do not hesitate to call me. I look forward to following your patient along with you. Sincerely, Indiana Kirk MD

## 2019-12-12 NOTE — PROGRESS NOTES
Lizzy Collier MD        Patient Information  Date:12/12/2019  Name : Keshav Zuluaga 49 y.o.     YOB: 1965         Referred by: Mae Lake MD         Chief Complaint   Patient presents with    Diabetes       History of Present Illness: Keshav Zuluaga is a 49 y.o. female here for fu of  Type 2 Diabetes Mellitus. She was referred by Mae Lake MD for evaluation and management of diabetes. No severe hypoglycemia  On metformin  Stress  Gaining weight   Diet could be healthy    She is compliant with the medications        She was diagnosed with type 2 diabetes 30 years ago,         Wt Readings from Last 3 Encounters:   12/12/19 258 lb 12.8 oz (117.4 kg)   08/05/19 248 lb (112.5 kg)   02/14/19 256 lb (116.1 kg)       BP Readings from Last 3 Encounters:   12/12/19 126/66   08/05/19 124/56   02/14/19 118/57           Past Medical History:   Diagnosis Date    CAD (coronary artery disease)     Dr Phan Dash [facility])     Hypertension      Current Outpatient Medications   Medication Sig    busPIRone (BUSPAR) 5 mg tablet TK 1 T PO BID FOR ANXIETY    lancets (ONETOUCH DELICA LANCETS) 30 gauge misc TEST BLOOD GLUCOSE TWICE DAILY    oxybutynin chloride XL (DITROPAN XL) 5 mg CR tablet Take 1 Tab by mouth daily.  lancets (TRUEPLUS LANCETS) 33 gauge misc Test blood glucose twice daily Dx Code: E11.65    glucose blood VI test strips (ONETOUCH ULTRA BLUE TEST STRIP) strip TEST BLOOD GLUCOSE TWICE DAILY Dx Code:E 11.65    aspirin delayed-release 81 mg tablet Take 81 mg by mouth daily.  Blood-Glucose Meter (TRUE METRIX AIR GLUCOSE METER) monitoring kit Test blood glucose twice daily Dx Code: E11.65    Insulin Needles, Disposable, (CARLIE PEN NEEDLE) 32 gauge x 5/32\" ndle Use to inject insulin daily Dx Code: E11.65    metFORMIN (GLUCOPHAGE) 1,000 mg tablet Take 1 Tab by mouth two (2) times daily (with meals).  Stop Glipizide    atorvastatin (LIPITOR) 40 mg tablet Take 40 mg by mouth daily.  metoprolol succinate (TOPROL-XL) 25 mg XL tablet Take 12.5 mg by mouth daily.  lisinopril (PRINIVIL, ZESTRIL) 10 mg tablet Take 10 mg by mouth daily. No current facility-administered medications for this visit. No Known Allergies      Review of Systems:  - Constitutional Symptoms: no fevers, no chills, no weight loss  - Eyes: no blurry vision no double vision  - Musculoskeletal: No joint pains +  weakness  - Integumentary: no rashes  - Neurological: + numbness, tingling, no  headaches  - Psychiatric: no depression no  anxiety  - Endocrine: no heat or cold intolerance    Physical Examination:   Blood pressure 126/66, pulse (!) 45, temperature 98.7 °F (37.1 °C), temperature source Oral, resp. rate 14, height 5' 11\" (1.803 m), weight 258 lb 12.8 oz (117.4 kg), SpO2 99 %. Estimated body mass index is 36.1 kg/m² as calculated from the following:    Height as of this encounter: 5' 11\" (1.803 m). -   Weight as of this encounter: 258 lb 12.8 oz (117.4 kg).   - General: pleasant, no distress, good eye contact  - HEENT: no pallor, no periorbital edema, EOMI  - Neck: supple, no thyromegaly, no nodules  - Cardiovascular: regular, normal rate, normal S1 and S2,  - Respiratory: clear to auscultation bilaterally  - Gastrointestinal: soft, nontender, nondistended,  BS +  - Musculoskeletal:  no edema,   - Neurological: alert and oriented  - Psychiatric: normal mood and affect  - Skin: color, texture, turgor normal.     Diabetic foot exam: February 2019    Left: Skin -  xerosis   Vibratory sensation absent    Filament test absent sensation with micro filament   Pulse DP: 1+ (weak)    Deformities: onycomycosis    Right: Skin -  xerosis   Vibratory sensation absent   Filament test absent sensation with micro filament   Pulse DP: 2+ (normal)              Deformities: onychomycosis      Data Reviewed:     Lab Results   Component Value Date/Time    Hemoglobin A1c 6.9 (H) 12/05/2019 10:03 AM    Hemoglobin A1c 6.9 (H) 02/07/2019 08:58 AM    Hemoglobin A1c 5.9 (H) 05/11/2018 11:15 AM    Glucose 146 (H) 12/05/2019 10:03 AM    Glucose  10/19/2018 10:10 AM    Microalb/Creat ratio (ug/mg creat.) <1.5 12/05/2019 10:03 AM    LDL, calculated 52 02/07/2019 08:58 AM    Creatinine 0.93 12/05/2019 10:03 AM      Lab Results   Component Value Date/Time    Cholesterol, total 103 02/07/2019 08:58 AM    HDL Cholesterol 38 (L) 02/07/2019 08:58 AM    LDL, calculated 52 02/07/2019 08:58 AM    LDL-C, External 70 10/14/2015    Triglyceride 64 02/07/2019 08:58 AM     Lab Results   Component Value Date/Time    GFR est non-AA 70 12/05/2019 10:03 AM    GFR est AA 81 12/05/2019 10:03 AM    Creatinine 0.93 12/05/2019 10:03 AM    BUN 10 12/05/2019 10:03 AM    Sodium 143 12/05/2019 10:03 AM    Potassium 4.4 12/05/2019 10:03 AM    Chloride 101 12/05/2019 10:03 AM    CO2 25 12/05/2019 10:03 AM         Assessment/Plan:     1. Type 2 diabetes mellitus with diabetic neuropathy, without long-term current use of insulin ([address]    2. Essential hypertension with goal blood pressure less than 140/90    3. Hyperlipidemia, mixed        1. Type 2 Diabetes Mellitus , neuropathy   Lab Results   Component Value Date/Time    Hemoglobin A1c 6.9 (H) 12/05/2019 10:03 AM    Hemoglobin A1c (POC) 5.4 10/19/2018 10:10 AM   Controlled   continue Metformin, stopped Actos. Off lantus ,off Trulicity    Advised to check glucose once daily  FLU annually ,Pneumovax ,aspirin daily,annual eye exam,microalbumin    2. HTN : Continue current therapy       3. Hyperlipidemia : Continue statin. 4.Obesity:Body mass index is 36.1 kg/m². Discussed about the importance of exercise and carbohydrate portion control. 5. Bradycardia, asymptomatic - managed by Cardiology - on B blockers       Peripheral neuropathy: Foot care        Thank you for allowing me to participate in the care of this patient.     Coby Wilcox, MD    Patient verbalized understanding

## 2020-02-10 ENCOUNTER — ED HISTORICAL/CONVERTED ENCOUNTER (OUTPATIENT)
Dept: OTHER | Age: 55
End: 2020-02-10

## 2020-03-04 LAB
CREATININE, EXTERNAL: 0.83
HBA1C MFR BLD HPLC: 7 %
LDL-C, EXTERNAL: 59

## 2020-03-19 ENCOUNTER — OP HISTORICAL/CONVERTED ENCOUNTER (OUTPATIENT)
Dept: OTHER | Age: 55
End: 2020-03-19

## 2020-04-06 ENCOUNTER — TELEPHONE (OUTPATIENT)
Dept: ENDOCRINOLOGY | Age: 55
End: 2020-04-06

## 2020-04-06 DIAGNOSIS — I10 ESSENTIAL HYPERTENSION: ICD-10-CM

## 2020-04-06 DIAGNOSIS — E11.40 TYPE 2 DIABETES MELLITUS WITH DIABETIC NEUROPATHY, WITHOUT LONG-TERM CURRENT USE OF INSULIN (HCC): Primary | ICD-10-CM

## 2020-04-06 DIAGNOSIS — E78.2 HYPERLIPIDEMIA, MIXED: ICD-10-CM

## 2020-04-06 NOTE — TELEPHONE ENCOUNTER
Informed pt lab appt will be canceled. Lab slips will be mailed to pt to have done at a [facility]. Also informed pt that upcoming appt will be converted to a virtual visit. The  will contact pt with further info. Order placed for pt per verbal order with read back from Dr. Ventura Flies 04/06/20      Lab slips mailed.

## 2020-04-22 ENCOUNTER — VIRTUAL VISIT (OUTPATIENT)
Dept: ENDOCRINOLOGY | Age: 55
End: 2020-04-22

## 2020-04-22 DIAGNOSIS — E11.40 TYPE 2 DIABETES MELLITUS WITH DIABETIC NEUROPATHY, WITHOUT LONG-TERM CURRENT USE OF INSULIN (HCC): Primary | ICD-10-CM

## 2020-04-22 DIAGNOSIS — I10 ESSENTIAL HYPERTENSION WITH GOAL BLOOD PRESSURE LESS THAN 140/90: ICD-10-CM

## 2020-04-22 DIAGNOSIS — E78.2 HYPERLIPIDEMIA, MIXED: ICD-10-CM

## 2020-04-22 NOTE — PROGRESS NOTES
Juno Tom is a 49 y.o. female here for   Chief Complaint   Patient presents with    Diabetes       1. Have you been to the ER, urgent care clinic since your last visit? Hospitalized since your last visit? - no    2. Have you seen or consulted any other health care providers outside of the [address] [place] [address] since your last visit?   Include any pap smears or colon screening.- PCP   Order placed for pt,  per Verbal Order with read back from Dr Brianna Lenz 4/22/2020

## 2020-04-22 NOTE — PROGRESS NOTES
Brayan Houston MD        Patient Information  Date:4/23/2020  Name : Cheryle Mcdonnell 49 y.o.     YOB: 1965         Referred by: Darwin Patel MD         Chief Complaint   Patient presents with    Diabetes     Pursuant to the emergency declaration under the [facility] [place] [address] [facility] authority and the [employer] Act, this Virtual  Visit was conducted, with patient's consent, to reduce the patient's risk of exposure to COVID-19 . Patient  is aware that this is a billable encounter and is responsible for copays/deductibles       Services were provided through a video synchronous discussion virtually to substitute for in-person clinic visit. Place of service: Provider : Office  Patient: Home  History of Present Illness: Cheryle Mcdonnell is a 49 y.o. female here for fu of  Type 2 Diabetes Mellitus. She was referred by Darwin Patel MD for evaluation and management of diabetes. No severe hypoglycemia  On metformin  Off insulin, off pioglitazone  Gaining weight   Diet has not changed    She is compliant with the medications        She was diagnosed with type 2 diabetes 30 years ago,         Wt Readings from Last 3 Encounters:   12/12/19 258 lb 12.8 oz (117.4 kg)   08/05/19 248 lb (112.5 kg)   02/14/19 256 lb (116.1 kg)       BP Readings from Last 3 Encounters:   12/12/19 126/66   08/05/19 124/56   02/14/19 118/57           Past Medical History:   Diagnosis Date    CAD (coronary artery disease)     Dr Margarita Martinez Diabetes [facility])     Hypertension      Current Outpatient Medications   Medication Sig    busPIRone (BUSPAR) 5 mg tablet TK 1 T PO BID FOR ANXIETY    lancets (ONETOUCH DELICA LANCETS) 30 gauge misc TEST BLOOD GLUCOSE TWICE DAILY    oxybutynin chloride XL (DITROPAN XL) 5 mg CR tablet Take 1 Tab by mouth daily.     lancets (TRUEPLUS LANCETS) 33 gauge misc Test blood glucose twice daily Dx Code: E11.65    glucose blood VI test strips (ONETOUCH ULTRA BLUE TEST STRIP) strip TEST BLOOD GLUCOSE TWICE DAILY Dx Code:E 11.65    aspirin delayed-release 81 mg tablet Take 81 mg by mouth daily.  Blood-Glucose Meter (TRUE METRIX AIR GLUCOSE METER) monitoring kit Test blood glucose twice daily Dx Code: E11.65    metFORMIN (GLUCOPHAGE) 1,000 mg tablet Take 1 Tab by mouth two (2) times daily (with meals). Stop Glipizide    atorvastatin (LIPITOR) 40 mg tablet Take 40 mg by mouth daily.  lisinopril (PRINIVIL, ZESTRIL) 10 mg tablet Take 10 mg by mouth daily.  metoprolol succinate (TOPROL-XL) 25 mg XL tablet Take 12.5 mg by mouth daily. No current facility-administered medications for this visit. No Known Allergies      Review of Systems:  - Constitutional Symptoms: no fevers, no chills, no weight loss  - Eyes: no blurry vision no double vision  - Musculoskeletal: No joint pains +  weakness  - Integumentary: no rashes  - Neurological: + numbness, tingling, no  headaches  - Psychiatric: no depression no  anxiety  - Endocrine: no heat or cold intolerance    Physical Examination:   There were no vitals taken for this visit. Estimated body mass index is 36.1 kg/m² as calculated from the following:    Height as of 12/12/19: 5' 11\" (1.803 m). -   Weight as of 12/12/19: 258 lb 12.8 oz (117.4 kg).   - General: pleasant, no distress, good eye contact  - HEENT: no exophthalmos, no periorbital edema, EOMI  - Neck: No visible thyromegaly  - RS: Normal respiratory effort  - Musculoskeletal: no tremors  - Neurological: alert and oriented  - Psychiatric: normal mood and affect  - Skin: Normal color    Diabetic foot exam: February 2019    Left: Skin -  xerosis   Vibratory sensation absent    Filament test absent sensation with micro filament   Pulse DP: 1+ (weak)    Deformities: onycomycosis    Right: Skin -  xerosis   Vibratory sensation absent   Filament test absent sensation with micro filament   Pulse DP: 2+ (normal)              Deformities: onychomycosis      Data Reviewed:     Lab Results   Component Value Date/Time    Hemoglobin A1c 6.9 (H) 12/05/2019 10:03 AM    Hemoglobin A1c 6.9 (H) 02/07/2019 08:58 AM    Hemoglobin A1c 5.9 (H) 05/11/2018 11:15 AM    Glucose 146 (H) 12/05/2019 10:03 AM    Glucose  10/19/2018 10:10 AM    Microalb/Creat ratio (ug/mg creat.) <1.5 12/05/2019 10:03 AM    LDL, calculated 52 02/07/2019 08:58 AM    Creatinine 0.93 12/05/2019 10:03 AM      Lab Results   Component Value Date/Time    Cholesterol, total 103 02/07/2019 08:58 AM    HDL Cholesterol 38 (L) 02/07/2019 08:58 AM    LDL, calculated 52 02/07/2019 08:58 AM    LDL-C, External 70 10/14/2015    Triglyceride 64 02/07/2019 08:58 AM     Lab Results   Component Value Date/Time    GFR est non-AA 70 12/05/2019 10:03 AM    GFR est AA 81 12/05/2019 10:03 AM    Creatinine 0.93 12/05/2019 10:03 AM    BUN 10 12/05/2019 10:03 AM    Sodium 143 12/05/2019 10:03 AM    Potassium 4.4 12/05/2019 10:03 AM    Chloride 101 12/05/2019 10:03 AM    CO2 25 12/05/2019 10:03 AM         Assessment/Plan:     1. Type 2 diabetes mellitus with diabetic neuropathy, without long-term current use of insulin ([facility] [address]    2. Essential hypertension with goal blood pressure less than 140/90    3. Hyperlipidemia, mixed        1. Type 2 Diabetes Mellitus , neuropathy   Lab Results   Component Value Date/Time    Hemoglobin A1c 6.9 (H) 12/05/2019 10:03 AM    Hemoglobin A1c (POC) 5.4 10/19/2018 10:10 AM   Controlled   continue Metformin, off Actos, off Lantus, Trulicity    Advised to check glucose once daily  FLU annually ,Pneumovax ,aspirin daily,annual eye exam,microalbumin    2. HTN : Continue current therapy       3. Hyperlipidemia : Continue statin. 4.Obesity:There is no height or weight on file to calculate BMI. Discussed about the importance of exercise and carbohydrate portion control.        5. Bradycardia, asymptomatic - managed by Cardiology - on B blockers       Peripheral neuropathy: Stable    Follow-up and Dispositions    · Return in about 4 months (around 8/22/2020) for labs before next visit and follow up. Thank you for allowing me to participate in the care of this patient.     Odessa Casey MD    Patient verbalized understanding

## 2020-04-27 ENCOUNTER — OP HISTORICAL/CONVERTED ENCOUNTER (OUTPATIENT)
Dept: OTHER | Age: 55
End: 2020-04-27

## 2020-07-24 ENCOUNTER — OP HISTORICAL/CONVERTED ENCOUNTER (OUTPATIENT)
Dept: OTHER | Age: 55
End: 2020-07-24

## 2020-08-10 ENCOUNTER — OP HISTORICAL/CONVERTED ENCOUNTER (OUTPATIENT)
Dept: OTHER | Age: 55
End: 2020-08-10

## 2020-08-18 ENCOUNTER — OP HISTORICAL/CONVERTED ENCOUNTER (OUTPATIENT)
Dept: OTHER | Age: 55
End: 2020-08-18

## 2020-09-04 ENCOUNTER — OP HISTORICAL/CONVERTED ENCOUNTER (OUTPATIENT)
Dept: OTHER | Age: 55
End: 2020-09-04

## 2020-09-16 ENCOUNTER — HOSPITAL ENCOUNTER (OUTPATIENT)
Dept: PHYSICAL THERAPY | Age: 55
Discharge: HOME | End: 2020-09-16
Payer: MEDICARE

## 2020-09-16 PROCEDURE — 97014 ELECTRIC STIMULATION THERAPY: CPT

## 2020-09-16 PROCEDURE — 97110 THERAPEUTIC EXERCISES: CPT

## 2020-09-16 NOTE — PROGRESS NOTES
PT DAILY TREATMENT NOTE - [facility] 2-15    Patient Name: Tate Flores  Date:2020  : 1965  [x]  Patient  Verified  Payor: Sunil Ruiz / Plan: [employer] / Product Type: Managed Care Medicare /    In time:2:20pm  Out time: 3:10pm   Total Treatment Time (min): 50min  Total Timed Codes (min): 35min  1:1 Treatment Time ( [address] only): 35min  Visit #: 8     *Please refer to paper chart for documentation regarding prior visits. Treatment Area: Low back pain [M54.5]  Bilateral hip pain [M25.551, M25.552]    SUBJECTIVE  Pain Level (0-10 scale): 10/10  Any medication changes, allergies to medications, adverse drug reactions, diagnosis change, or new procedure performed?: [x] No    [] Yes (see summary sheet for update)  Subjective functional status/changes:   [] No changes reported  Patient reports she fell this morning and her right knee gave out when she was walking at home and she hurt her back. She did not go the the doctor, took Tylanol . Patient stated her back is sore from this morning. OBJECTIVE    Modality rationale: decrease pain and increase tissue extensibility to improve the patients ability to improve mobility.    Min Type Additional Details      15  [x] Estim: []Att   [x]Unatt    []TENS instruct                  [x]IFC  []Premod   []NMES                     []Other:  []w/US   []w/ice   [x]w/heat  Position: Prone   Location: lower back L4-L5 area       []  Traction: [] Cervical       []Lumbar                       [] Prone          []Supine                       []Intermittent   []Continuous Lbs:  [] before manual  [] after manual  []w/heat    []  Ultrasound: []Continuous   [] Pulsed                       at: []1MHz   []3MHz Location:  W/cm2:    [] Paraffin         Location:   []w/heat    []  Ice     []  Heat  []  Ice massage Position:  Location:    []  Laser  []  Other: Position:  Location:      []  Vasopneumatic Device Pressure:       [] lo [] med [] hi   Temperature: [x] Skin assessment post-treatment:  [x]intact []redness- no adverse reaction    []redness  adverse reaction:       35 min Therapeutic Exercise:  [x] See flow sheet :   Rationale: increase ROM, increase strength, improve coordination, improve balance and increase proprioception to improve the patients ability to increase her upright posture and improve ambulation tolerance. With   [x] TE   [] TA   [] neuro   [] other: Patient Education: [x] Review HEP    [x] Progressed/Changed HEP based on:   [] positioning   [] body mechanics   [] transfers   [] heat/ice application    [x] other: due to LBP and discomfort. Other Objective/Functional Measures:  Progressed with exercises as tolerated, discomfort reported with SLR and mini press ups thus not performed due to increased LBP with activity. With prone exercises noted decreased R>L hip extension. Tender to palpation on R>L paraspinals from L4-L2 area. Pain Level (0-10 scale) post treatment: 8/10    ASSESSMENT/Changes in Function:   Patient had a set back, secondary to a recent fall, exercises progress as tolerated and added heat/ES to assist with pain reduction. Patient continues to present with decreased core and  LE weakness. Overall, Patient stated she felt slight better post session and was glad she didn't cancel. Patient educated to call MD if back pain continues to bother her, and encouraged to use heating pack at home for 20min as needed and do her stretches and exercises as tolerated. Patient will continue to benefit from skilled PT services to modify and progress therapeutic interventions, address functional mobility deficits, address ROM deficits, address strength deficits, analyze and address soft tissue restrictions, analyze and cue movement patterns, analyze and modify body mechanics/ergonomics, assess and modify postural abnormalities and instruct in home and community integration to attain remaining goals.      []  See Plan of Care  []  See progress note/recertification  []  See Discharge Summary         Progress towards goals / Updated goals:    STG  1. Patient will be independent in HEP within 1-2 weeks. 2. Patient will increased quad length flexibility by 10-20 deg to reduce pain   3. Patient will be able to perform sit<-> stand transfers with decreased pain in 3-4 weeks. 4. Patient will be able to perform SLS >10sec to improve balance within 3-4 weeks. LTG   1. Patient will be able to ambulate 150ft without difficulty and pain with LARD within 4-8 weeks. 2. Patient will be able to negotiate stairs without difficulty within 4-8 weeks. 3. Patient will be able to stand >30 min without difficulty within 4-8 weeks.          PLAN  [x]  Upgrade activities as tolerated     [x]  Continue plan of care  []  Update interventions per flow sheet       []  Discharge due to:_  []  Other:_      Ivania Sparks, PT 9/16/2020

## 2020-09-18 ENCOUNTER — HOSPITAL ENCOUNTER (OUTPATIENT)
Dept: PHYSICAL THERAPY | Age: 55
Discharge: HOME | End: 2020-09-18
Payer: MEDICARE

## 2020-09-18 PROCEDURE — 97140 MANUAL THERAPY 1/> REGIONS: CPT

## 2020-09-18 PROCEDURE — 97014 ELECTRIC STIMULATION THERAPY: CPT

## 2020-09-18 PROCEDURE — 97110 THERAPEUTIC EXERCISES: CPT

## 2020-09-18 NOTE — PROGRESS NOTES
PT DAILY TREATMENT NOTE - [facility] 2-15    Patient Name: Iwona Tinsley  Date:2020  : 1965  [x]  Patient  Verified  Payor: Margie Salazar / Plan: [employer] / Product Type: Managed Care Medicare /    In time:2:09pm  Out time: 3:18 pm   Total Treatment Time (min): 69 min  Total Timed Codes (min): 46min  1:1 Treatment Time ( only): 46 min  Visit #: 9     *Please refer to paper chart for documentation regarding prior visits. Treatment Area: Low back pain [M54.5]  Bilateral hip pain [M25.551, M25.552]    SUBJECTIVE  Pain Level (0-10 scale): 6/10 in lower back  Any medication changes, allergies to medications, adverse drug reactions, diagnosis change, or new procedure performed?: [x] No    [] Yes (see summary sheet for update)  Subjective functional status/changes:   [] No changes reported  Patient reports continuing R knee bucking, but stated she hasn't gone to the doctor unless it gets worse. No falls since last session, she was slight sore after exercises and current pain is 6/10. OBJECTIVE    Modality rationale: decrease pain and increase tissue extensibility to improve the patients ability to improve mobility.    Min Type Additional Details      15  [x] Estim: []Att   [x]Unatt    []TENS instruct                  [x]IFC  []Premod   []NMES                     []Other:  []w/US   []w/ice   [x]w/heat  Position: Prone   Location: lower back L4-L5 area       []  Traction: [] Cervical       []Lumbar                       [] Prone          []Supine                       []Intermittent   []Continuous Lbs:  [] before manual  [] after manual  []w/heat    []  Ultrasound: []Continuous   [] Pulsed                       at: []1MHz   []3MHz Location:  W/cm2:    [] Paraffin         Location:   []w/heat    []  Ice     []  Heat  []  Ice massage Position:  Location:    []  Laser  []  Other: Position:  Location:      []  Vasopneumatic Device Pressure:       [] lo [] med [] hi   Temperature:      [x] Skin assessment post-treatment:  [x]intact []redness- no adverse reaction    []redness  adverse reaction:     36 min Therapeutic Exercise:  [x] See flow sheet :   Rationale: increase ROM, increase strength, improve coordination, improve balance and increase proprioception to improve the patients ability to increase her upright posture and improve ambulation tolerance without LBP. 10 min Manual Therapy: lower back area   Rationale: decrease pain, increase ROM, increase tissue extensibility, decrease trigger points and increase postural awareness to improve the patients ability to improve functional mobility. With   [x] TE   [] TA   [] neuro   [] other: Patient Education: [x] Review HEP    [x] Progressed/Changed HEP based on:   [] positioning   [] body mechanics   [] transfers   [] heat/ice application    [x] other: due to LBP and discomfort. Other Objective/Functional Measures:  Progressed with exercises as tolerated, no limitations noted with activities. Increased trigger points in lower back noted on R>L paraspinals. Pain Level (0-10 scale) post treatment: 6 /10 reports feeling about the same. ASSESSMENT/Changes in Function:   Activities progressed as tolerated due to lower back pain, LE weakness and decreased flexibility. Continue to progress with POC with addition of heat and ES to assit with subsiding pain and improve mobility. Patient also benefited from manual therapy due to increased trigger points in lower back. Patient will continue to benefit from skilled PT services to modify and progress therapeutic interventions, address functional mobility deficits, address ROM deficits, address strength deficits, analyze and address soft tissue restrictions, analyze and cue movement patterns, analyze and modify body mechanics/ergonomics, assess and modify postural abnormalities and instruct in home and community integration to attain remaining goals.      []  See Plan of Care  []  See progress note/recertification  []  See Discharge Summary         Progress towards goals / Updated goals:    STG  1. Patient will be independent in HEP within 1-2 weeks. 2. Patient will increased quad length flexibility by 10-20 deg to reduce pain   3. Patient will be able to perform sit<-> stand transfers with decreased pain in 3-4 weeks. 4. Patient will be able to perform SLS >10sec to improve balance within 3-4 weeks. LTG   1. Patient will be able to ambulate 150ft without difficulty and pain with LARD within 4-8 weeks. 2. Patient will be able to negotiate stairs without difficulty within 4-8 weeks. 3. Patient will be able to stand >30 min without difficulty within 4-8 weeks.          PLAN  [x]  Upgrade activities as tolerated     [x]  Continue plan of care  []  Update interventions per flow sheet       []  Discharge due to:_  []  Other:_      Laura Macdonald, PT 9/18/2020

## 2020-09-21 ENCOUNTER — APPOINTMENT (OUTPATIENT)
Dept: PHYSICAL THERAPY | Age: 55
End: 2020-09-21
Payer: MEDICARE

## 2020-09-23 ENCOUNTER — HOSPITAL ENCOUNTER (OUTPATIENT)
Dept: PHYSICAL THERAPY | Age: 55
Discharge: HOME | End: 2020-09-23
Payer: MEDICARE

## 2020-09-23 PROCEDURE — 97162 PT EVAL MOD COMPLEX 30 MIN: CPT

## 2020-09-23 NOTE — PROGRESS NOTES
[address] [address] [facility] [address] [facility], [address] [place] [address]  Ph: [phone]    Fax: [phone]    Plan of Care/Statement of Necessity for Physical Therapy Services  2-15    Patient name: Jair Quarles  : 1965  Provider#: [id number]  Referral source: Jez Kenney MD      Medical/Treatment Diagnosis: Low back pain [M54.5]  Pain in left shoulder [M25.512]     Prior Hospitalization: see medical history     Comorbidities: Heart disease, HBP, hearing impairments, asthma. Prior Level of Function: independent with cane. Medications: Verified on Patient Summary List  Start of Care: 20      Onset Date: 2020  The Plan of Care and following information is based on the information from the initial evaluation. Assessment/ key information:   Patient was referred to physical therapy with dx of shoulder impingement, patient present with decreased A/PROM, decreased strength, decreased flexibility, postural impariments and pain . Patient will benefit from skilled PT services to improve flexibility, range of motion, improve GH/scapula mobility, to assist with subsiding pain and improve patient shoulder mobility to ease with daily ADL's. Patient plan of care will include patient education, HEP, there-ex, strengthening, and ROM activities.      Problem List: pain affecting function, decrease ROM, decrease strength, decrease ADL/ functional abilitiies, decrease activity tolerance, decrease flexibility/ joint mobility and decrease transfer abilities   Treatment Plan may include any combination of the following: Therapeutic exercise, Therapeutic activities, Neuromuscular re-education, Physical agent/modality, Manual therapy, Patient education, Functional mobility training and Home safety training  Patient / Family readiness to learn indicated by: asking questions, trying to perform skills and interest  Persons(s) to be included in education: patient (P)  Barriers to Learning/Limitations: No   Patient Goal (s): \"To have better movemet and no pain\"  Patient Self Reported Health Status: fair  Rehabilitation Potential: good    Short Term Goals: To be accomplished in 3-4  weeks:  1. Patient will be independent with HEP to progress with plan of care. 2. Patient will improve (L)  shoulder AROM by 10 deg in flexion/abduction to ease mobility. 3. Patient will report decreased pain by 1-2 points to improve mobility. Long Term Goals: To be accomplished in 8-12  weeks:  1. Patient will able to perform sit<-> stand with use of left hand with less pain to improve her transfers and stability with functional activity   2. Patient will be able to sleep on left shoulder without pain to improve her quality of life. 3.  Patient will be able to reach to high cabinets to sock items in kitchen with less pain. 4. Patient will report ease with driving and not pain to improve quality of lift. Frequency / Duration: Patient to be seen 2 x times per week for 8-12 weeks. Patient/ Caregiver education and instruction: self care, activity modification and exercises    [x]  Plan of care has been reviewed with PTA      Certification Period: 9/23/2020- 12/23/2020    Sandy Li, PT 9/23/2020     ________________________________________________________________________    I certify that the above Therapy Services are being furnished while the patient is under my care. I agree with the treatment plan and certify that this therapy is necessary.     [de-identified] Signature:____________________  Date:____________Time: _________

## 2020-09-23 NOTE — PROGRESS NOTES
PT INITIAL EVALUATION NOTE - [facility]-15    Patient Name: Elder Arriola  Date:2020  : 1965  [x]  Patient  Verified  Payor: Shreyas Duncan / Plan: [employer] / Product Type: Managed Care Medicare /    In time: 2:15pm  Out time: 3:00pm   Total Treatment Time (min): 45min  Total Timed Codes (min): 45  1:1 Treatment Time ( [address] only): *45  Visit #: 1    Treatment Area: Low back pain [M54.5]  Pain in left shoulder [M25.512]    * New evaluation for left shoulder pain. SUBJECTIVE  Pain Level (0-10 scale): 7/10. 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 Pain is located in the front of the shoulder. Patient reports about a constant deep ache that ranged from 5-10/10. Patient reports doctor did x-ray which showed a RCT and she is going to see the doctor first week in [address] PLOF: Independent with use of cane. Mechanism of Injury: Does not recall  Previous Treatment/Compliance: None for shoulder  PMHx/Surgical Hx: None  Work Hx: Retired /previous job CNA  Living Situation: Live at home. Pt Goals: Goals : \" To have better movemet and no pain\". Barriers: N/A  Motivation: Good  Substance use: N/A  FABQ Score: N/A  Cognition: A & O x 4    OBJECTIVE/EXAMINATION  Description of symptoms:Dull ache  Aggravating Factors: any movement, when she places pressure on it upon standing, reaching, doing her hair, sleeping on left shoulder and doing house chores activities. Alleviating Factors:Patient stated she is used to the constant pain. Activities that make it feel better: medication : Prednisone and cyclophenazine (ease that pain), heat/ice don't help.      Radiation:None    Patient reports functional limitations with: Driving, sleeping, reaching activities. OBJECTIVE NECK/SHOULDER:  Posture:  Rounded shoulder, FW head  Other Observations:  R handed, decreased left hand use with sit<-> stand transfers. Increased time and effort to get from chair. Palpation: TTP on left AC joint and report deep pain inside shoulder. Cervical AROM: WNL full range in all directions and strength WFL        Shoulder ROM:   AROM   PROM        R      /      L     L    Flexion   150   /     70               85 guarding   Extension  70     /     40    Abduction  160   /     65                   85 guarding   Adduction          Spine L2 / hip joint      IR (supine)           WNL  /   70     ER (supine)            WNL /  5deg sharp pain     Joint Mobility Assessment: Glenohumeral: guarding unable to assess      Acromioclavicular: Tender with joint mob      Sternoclavicular: WNL    Flexibility: Apley noted decreased IR on L>R    UPPER QUARTER   MUSCLE STRENGTH  KEY       R  L  0 - No Contraction   Flexion  4/5  4/5! Front of shoulder  1 - Trace    Extension     2 - Poor    Abduction 4/5  4/5!  3 - Fair     IR  4/5  4-/5  4 - Good    ER  4/5  4/5  5 - Normal       Elbow/Wrist Joint WNL    Neurological: Reflexes / Sensations: WNL  Special Tests: Neer Impingement: Limited PROM pain inner shoulder [place]: (+) inside of shoulder joint     [place]: (+) pain inside of joint Speed's: (-)    AC Compression: (-)  Empty can (+) Full can (-)     Dropping sign (-) , [place] (-)     [employer] (-)       Modality rationale: decrease pain and increase tissue extensibility to improve the patients ability to ease shoulder mobility with less pain.    Min Type Additional Details    [] Estim: []Att   []Unatt        []TENS instruct                  []IFC  []Premod   []NMES                     []Other:  []w/US   []w/ice   []w/heat  Position:  Location:    []  Traction: [] Cervical       []Lumbar                       [] Prone []Supine                       []Intermittent   []Continuous Lbs:  [] before manual  [] after manual  []w/heat    []  Ultrasound: []Continuous   [] Pulsed at:                           []1MHz   []3MHz Location:  W/cm2:    [] Paraffin         Location:   []w/heat   15  []  Ice     [x]  Heat  []  Ice massage Position:supine   Location:L shoulder     []  Laser  []  Other: Position:  Location:      []  Vasopneumatic Device Pressure:       [] lo [] med [] hi   Temperature:      [x] Skin assessment post-treatment:  [x]intact []redness- no adverse reaction    []redness  adverse reaction:           With   [] TE   [] TA   [] neuro   [] other: Patient Education: [] Review HEP    [] Progressed/Changed HEP based on:   [] positioning   [] body mechanics   [] transfers   [] heat/ice application    [] other:      Other Objective/Functional Measures:   Impairments: patient presents with decreased A/RPOM, some weakness in RC musculature, pain, decreased flexibility  and postural mal-alignment. Pain Level (0-10 scale) post treatment: 7/10    ASSESSMENT/Changes in Function:   Patient was referred to physical therapy with dx of shoulder impingement, patient present with decreased A/PROM, decreased strength, decreased flexibility, postural impariments and pain . Patient will benefit from skilled PT services to improve flexibility, range of motion, improve GH/scapula mobility, to assist with subsiding pain and improve patient shoulder mobility to ease with daily ADL's. Patient plan of care will include patient education, HEP, there-ex, strengthening, and ROM activities.      [x]  See Plan of Care      Sandy Li, PT 9/23/2020

## 2020-09-25 ENCOUNTER — OFFICE VISIT (OUTPATIENT)
Dept: ENDOCRINOLOGY | Age: 55
End: 2020-09-25
Payer: MEDICARE

## 2020-09-25 ENCOUNTER — HOSPITAL ENCOUNTER (OUTPATIENT)
Dept: PHYSICAL THERAPY | Age: 55
Discharge: HOME | End: 2020-09-25
Payer: MEDICARE

## 2020-09-25 VITALS
BODY MASS INDEX: 40.04 KG/M2 | HEART RATE: 62 BPM | RESPIRATION RATE: 16 BRPM | TEMPERATURE: 97.5 F | HEIGHT: 71 IN | WEIGHT: 286 LBS | SYSTOLIC BLOOD PRESSURE: 140 MMHG | DIASTOLIC BLOOD PRESSURE: 71 MMHG

## 2020-09-25 DIAGNOSIS — I10 ESSENTIAL HYPERTENSION WITH GOAL BLOOD PRESSURE LESS THAN 140/90: ICD-10-CM

## 2020-09-25 DIAGNOSIS — E11.65 TYPE 2 DIABETES MELLITUS WITH HYPERGLYCEMIA, WITH LONG-TERM CURRENT USE OF INSULIN (HCC): ICD-10-CM

## 2020-09-25 DIAGNOSIS — Z79.4 TYPE 2 DIABETES MELLITUS WITH HYPERGLYCEMIA, WITH LONG-TERM CURRENT USE OF INSULIN (HCC): ICD-10-CM

## 2020-09-25 DIAGNOSIS — E78.2 HYPERLIPIDEMIA, MIXED: ICD-10-CM

## 2020-09-25 DIAGNOSIS — E11.40 TYPE 2 DIABETES MELLITUS WITH DIABETIC NEUROPATHY, WITHOUT LONG-TERM CURRENT USE OF INSULIN (HCC): Primary | ICD-10-CM

## 2020-09-25 PROCEDURE — 99214 OFFICE O/P EST MOD 30 MIN: CPT | Performed by: INTERNAL MEDICINE

## 2020-09-25 PROCEDURE — G8754 DIAS BP LESS 90: HCPCS | Performed by: INTERNAL MEDICINE

## 2020-09-25 PROCEDURE — G8427 DOCREV CUR MEDS BY ELIG CLIN: HCPCS | Performed by: INTERNAL MEDICINE

## 2020-09-25 PROCEDURE — 3051F HG A1C>EQUAL 7.0%<8.0%: CPT | Performed by: INTERNAL MEDICINE

## 2020-09-25 PROCEDURE — G8417 CALC BMI ABV UP PARAM F/U: HCPCS | Performed by: INTERNAL MEDICINE

## 2020-09-25 PROCEDURE — 97014 ELECTRIC STIMULATION THERAPY: CPT

## 2020-09-25 PROCEDURE — 3017F COLORECTAL CA SCREEN DOC REV: CPT | Performed by: INTERNAL MEDICINE

## 2020-09-25 PROCEDURE — G8510 SCR DEP NEG, NO PLAN REQD: HCPCS | Performed by: INTERNAL MEDICINE

## 2020-09-25 PROCEDURE — G8753 SYS BP > OR = 140: HCPCS | Performed by: INTERNAL MEDICINE

## 2020-09-25 PROCEDURE — 2022F DILAT RTA XM EVC RTNOPTHY: CPT | Performed by: INTERNAL MEDICINE

## 2020-09-25 PROCEDURE — 97110 THERAPEUTIC EXERCISES: CPT

## 2020-09-25 RX ORDER — GABAPENTIN 300 MG/1
CAPSULE ORAL
COMMUNITY
Start: 2020-08-28 | End: 2020-09-25

## 2020-09-25 RX ORDER — BLOOD-GLUCOSE CONTROL, NORMAL
EACH MISCELLANEOUS
Qty: 100 LANCET | Refills: 5 | Status: SHIPPED | OUTPATIENT
Start: 2020-09-25 | End: 2020-09-28 | Stop reason: ALTCHOICE

## 2020-09-25 RX ORDER — INSULIN PUMP SYRINGE, 3 ML
EACH MISCELLANEOUS
Qty: 1 KIT | Refills: 0 | Status: SHIPPED | OUTPATIENT
Start: 2020-09-25 | End: 2020-09-28 | Stop reason: ALTCHOICE

## 2020-09-25 RX ORDER — DULAGLUTIDE 0.75 MG/.5ML
0.75 INJECTION, SOLUTION SUBCUTANEOUS
Qty: 12 SYRINGE | Refills: 3 | Status: SHIPPED | OUTPATIENT
Start: 2020-09-25 | End: 2020-12-18

## 2020-09-25 NOTE — PROGRESS NOTES
PT DAILY TREATMENT NOTE - [facility] 2-15    Patient Name: Lilia Herrera  Date:2020  : 1965  [x]  Patient  Verified  Payor: Elissa Mccormick / Plan: [address] [place] [address] / Product Type: Managed Care Medicare /    In time:09:05am  Out time: 10:05am    Total Treatment Time (min): 60 min  Total Timed Codes (min): 45 min  1:1 Treatment Time ([facility] only): 45 min  Visit #: 10 (reassessment of POC)    Treatment Area: Low back pain [M54.5]  Pain in left shoulder [M25.512]    Treated lower back     SUBJECTIVE  Pain Level (0-10 scale): 8 /10 in lower back  Any medication changes, allergies to medications, adverse drug reactions, diagnosis change, or new procedure performed?: [x] No    [] Yes (see summary sheet for update)  Subjective functional status/changes:   [] No changes reported  Patient reports experiencing knee bucking but no falls and she continues to report lower back pain center of back. OBJECTIVE    Modality rationale: decrease pain and increase tissue extensibility to improve the patients ability to improve mobility.    Min Type Additional Details      15  [x] Estim: []Att   [x]Unatt    []TENS instruct                  [x]IFC  []Premod   []NMES                     []Other:  []w/US   []w/ice   [x]w/heat  Position: Prone   Location: lower back L4-L5 area       []  Traction: [] Cervical       []Lumbar                       [] Prone          []Supine                       []Intermittent   []Continuous Lbs:  [] before manual  [] after manual  []w/heat    []  Ultrasound: []Continuous   [] Pulsed                       at: []1MHz   []3MHz Location:  W/cm2:    [] Paraffin         Location:   []w/heat    []  Ice     []  Heat  []  Ice massage Position:  Location:    []  Laser  []  Other: Position:  Location:      []  Vasopneumatic Device Pressure:       [] lo [] med [] hi   Temperature:      [x] Skin assessment post-treatment:  [x]intact []redness- no adverse reaction    []redness  adverse reaction: 25 min Therapeutic Exercise:  [x] See flow sheet :   Rationale: increase ROM, increase strength, improve coordination, improve balance and increase proprioception to improve the patients ability to increase her upright posture and improve ambulation tolerance without LBP. With   [x] TE   [] TA   [] neuro   [] other: Patient Education: [x] Review HEP    [x] Progressed/Changed HEP based on:   [] positioning   [] body mechanics   [] transfers   [] heat/ice application    [x] other: due to LBP and discomfort. Other Objective/Functional Measures:     Lumbar assessment  Flexion 6 inch from floor no pain   Extension 20 deg no pain   SB R/L 23 inch pain on center of back bilateral   More mobility noted since previous POC with increased mobility in flexion/extension without pain and with R/L SB noted improvement but with increased pain at the center of back bilaterally. MMT assessment   Hip flexion 4/5 Bilateral   Knee extension 4/5 bilateral mild pain in R knee   Hip abd sidlying R 4-/5 mild pain L 4/5 able to get into side lying pain  Strength improved by 1-2 points since initial eval    SLS - Balance  R 5 sec   L 6 sec   * Previous assessment  Patient was only able to hold 1-3 sec     Sit <->  30sec = 6 reps reports R knee pain 6/10 ( At initial evel  4 rep also limited by R knee pain)     Tug = 30sec with use of cane ( At initial eval was 33 sec)     Flexibility  Quad - noted 5 deg pass 90's  Hamstring 90/90 (R)50 deg (L)=59deg Noted some improvement in range. Palpation Increased trigger points in lower back noted on R>L paraspinals. Pain Level (0-10 scale) post treatment: 8 /10 reports feeling about the same. ASSESSMENT/Changes in Function:   Patient has been progressing with exercises however, had a recent set back 2 session ago,due to a fall secondary to R knee bucking at home. Patient hurt and bruised her back on R>L side.   As a result pain is TTP on lower back L2-L5 area R>L and pain level is still high. The goal in the last 2 sessions have been reducing the pain level and slowly progressing with exercises as tolerated. Despite the set back patient was able to  show some improvement in strength in BLE, lumbar mobility with less pain, noted some improvement in quad/HS flexibility, SLS balance and TUG results. We will continue to progress with POC with addition of heat and ES to assit with subsiding pain and improving mobility. Patient also benefited from manual therapy due to increased trigger points in lower back. Patient will continue to benefit from skilled PT services to modify and progress therapeutic interventions, address functional mobility deficits, address ROM deficits, address strength deficits, analyze and address soft tissue restrictions, analyze and cue movement patterns, analyze and modify body mechanics/ergonomics, assess and modify postural abnormalities and instruct in home and community integration to attain remaining goals. []  See Plan of Care  [x]  See progress note/recertification  []  See Discharge Summary         Progress towards goals / Updated goals:    STG  1. Patient will be independent in HEP within 1-2 weeks. [x] Met [] Not met [] Partially met   2. Patient will increased quad length flexibility by 10-20 deg to reduce pain [] Met [] Not met [x] Partially met   3. Patient will be able to perform sit<-> stand transfers with decreased pain in 3-4 weeks. [] Met [] Not met [x] Partially met   4. Patient will be able to perform SLS >10sec to improve balance within 3-4 weeks. [] Met [x] Not met [] Partially met     LTG   1. Patient will be able to ambulate 150ft without difficulty and pain with LARD within 4-8 weeks. [] Met [] Not met [x] Partially met   2. Patient will be able to negotiate stairs without difficulty within 4-8 weeks. [] Met [] Not met [x] Partially met Back /knee   3. Patient will be able to stand >30 min without difficulty within 4-8 weeks.  [] Met [] Not met [x] Partially met  Back /knee limiting       PLAN  [x]  Upgrade activities as tolerated     [x]  Continue plan of care  []  Update interventions per flow sheet       []  Discharge due to:_  []  Other:_      Sandy Li, PT 9/25/2020

## 2020-09-25 NOTE — PROGRESS NOTES
[address] [address] [address] [facility] [address] [facility], [facility]  Ph: [phone]    Fax: [phone]    Progress Note    Name: Bev Ragsdale   : 1965   MD: Anny Messer MD       Treatment Diagnosis: Low back pain [M54.5]  Pain in left shoulder [M25.512]  Start of Care: 08/10/20   Visits from Start of Care: 10   Missed Visits: 1    Summary of Care:   Patient has been progressing with exercises however, had a recent set back 2 session ago,due to a fall secondary to R knee bucking at home. Patient hurt and bruised her back on R>L side. As a result pain is TTP on lower back L2-L5 area R>L and pain level is still high. Objective assessment:   Lumbar assessment  Flexion 6 inch from floor no pain   Extension 20 deg no pain   SB R/L 23 inch pain on center of back bilateral   More mobility noted since previous POC with increased mobility in flexion/extension without pain and with R/L SB noted improvement but with increased pain at the center of back bilaterally. MMT assessment   Hip flexion 4/5 Bilateral   Knee extension 4/5 bilateral mild pain in R knee   Hip abd sidlying R 4-/5 mild pain L 4/5 able to get into side lying pain  Strength improved by 1-2 points since initial eval    SLS - Balance  R 5 sec   L 6 sec   * Previous assessment  Patient was only able to hold 1-3 sec     Sit <->  30sec = 6 reps reports R knee pain 6/10 ( At initial evel  4 rep also limited by R knee pain)     Tug = 30sec with use of cane ( At initial eval was 33 sec)   - Patient is still a fall risk    Flexibility  Quad - noted 5 deg pass 90's  Hamstring 90/90 (R)50 deg (L)=59deg Noted some improvement in range. Palpation Increased trigger points in lower back noted on R>L paraspinals. ASSESSMENT/Changes in Function:   The goal in the last 2 sessions have been reducing the pain level and slowly progressing with exercises as tolerated.  Despite the set back patient was able to  show some improvement in strength in BLE, lumbar mobility with less pain, noted some improvement in quad/HS flexibility, SLS balance and TUG results. We will continue to progress with POC with addition of heat and ES to assit with subsiding pain and improving mobility. Patient also benefited from manual therapy due to increased trigger points in lower back. Patient will continue to benefit from skilled PT services to modify and progress therapeutic interventions, address functional mobility deficits, address ROM deficits, address strength deficits, analyze and address soft tissue restrictions, analyze and cue movement patterns, analyze and modify body mechanics/ergonomics, assess and modify postural abnormalities and instruct in home and community integration to attain remaining goals. Progress towards goals / Updated goals:    STG  1. Patient will be independent in HEP within 1-2 weeks. [x] Met [] Not met [] Partially met   2. Patient will increased quad length flexibility by 10-20 deg to reduce pain [] Met [] Not met [x] Partially met   3. Patient will be able to perform sit<-> stand transfers with decreased pain in 3-4 weeks. [] Met [] Not met [x] Partially met   4. Patient will be able to perform SLS >10sec to improve balance within 3-4 weeks. [] Met [x] Not met [] Partially met     LTG   1. Patient will be able to ambulate 150ft without difficulty and pain with LARD within 4-8 weeks. [] Met [] Not met [x] Partially met   2. Patient will be able to negotiate stairs without difficulty within 4-8 weeks. [] Met [] Not met [x] Partially met Back /knee   3. Patient will be able to stand >30 min without difficulty within 4-8 weeks. [] Met [] Not met [x] Partially met  Back /knee limiting     Other: Continue with POC.          Sandy Li, PT 9/25/2020     ________________________________________________________________________  NOTE TO PHYSICIAN:  Please complete the following and fax to:  [facility]  Fax: [phone]  . Retain this original for your records. If you are unable to process this request in 24 hours, please contact our office.        ____ I have read the above report and request that my patient continue therapy with the following changes/special instructions:  ____ I have read the above report and request that my patient be discharged from therapy    Physician's Signature:_________________ Date:___________Time:__________

## 2020-09-25 NOTE — LETTER
9/26/20 Patient: Maxim Olivier YOB: 1965 Date of Visit: 9/25/2020 Bisi Borjas MD 
[address] [facility] [address] VIA Facsimile: [phone] Dear Bisi Borjas MD, Thank you for referring Ms. Lara Zapata to [facility] for evaluation. My notes for this consultation are attached. If you have questions, please do not hesitate to call me. I look forward to following your patient along with you. Sincerely, Cristhian Wray MD

## 2020-09-25 NOTE — PROGRESS NOTES
Wt Readings from Last 3 Encounters:   09/25/20 286 lb (129.7 kg)   12/12/19 258 lb 12.8 oz (117.4 kg)   08/05/19 248 lb (112.5 kg)     Temp Readings from Last 3 Encounters:   09/25/20 97.5 °F (36.4 °C) (Oral)   12/12/19 98.7 °F (37.1 °C) (Oral)   08/05/19 97.7 °F (36.5 °C) (Oral)     BP Readings from Last 3 Encounters:   09/25/20 (!) 140/71   12/12/19 126/66   08/05/19 124/56     Pulse Readings from Last 3 Encounters:   09/25/20 62   12/12/19 (!) 45   08/05/19 (!) 48     Lab Results   Component Value Date/Time    Hemoglobin A1c 6.9 (H) 12/05/2019 10:03 AM    Hemoglobin A1c (POC) 5.4 10/19/2018 10:10 AM    Hemoglobin A1c, External 7.0 03/04/2020

## 2020-09-26 LAB
ALBUMIN SERPL-MCNC: 3.3 G/DL (ref 3.5–5)
ALBUMIN/GLOB SERPL: 1 {RATIO} (ref 1.1–2.2)
ALP SERPL-CCNC: 121 U/L (ref 45–117)
ALT SERPL-CCNC: 22 U/L (ref 12–78)
ANION GAP SERPL CALC-SCNC: 8 MMOL/L (ref 5–15)
AST SERPL-CCNC: 14 U/L (ref 15–37)
BILIRUB SERPL-MCNC: 0.5 MG/DL (ref 0.2–1)
BUN SERPL-MCNC: 13 MG/DL (ref 6–20)
BUN/CREAT SERPL: 10 (ref 12–20)
CALCIUM SERPL-MCNC: 8.5 MG/DL (ref 8.5–10.1)
CHLORIDE SERPL-SCNC: 105 MMOL/L (ref 97–108)
CO2 SERPL-SCNC: 26 MMOL/L (ref 21–32)
CREAT SERPL-MCNC: 1.25 MG/DL (ref 0.55–1.02)
EST. AVERAGE GLUCOSE BLD GHB EST-MCNC: 292 MG/DL
GLOBULIN SER CALC-MCNC: 3.3 G/DL (ref 2–4)
GLUCOSE SERPL-MCNC: 436 MG/DL (ref 65–100)
HBA1C MFR BLD: 11.8 % (ref 4–5.6)
POTASSIUM SERPL-SCNC: 3.9 MMOL/L (ref 3.5–5.1)
PROT SERPL-MCNC: 6.6 G/DL (ref 6.4–8.2)
SODIUM SERPL-SCNC: 139 MMOL/L (ref 136–145)

## 2020-09-28 ENCOUNTER — TELEPHONE (OUTPATIENT)
Dept: ENDOCRINOLOGY | Age: 55
End: 2020-09-28

## 2020-09-28 ENCOUNTER — APPOINTMENT (OUTPATIENT)
Dept: PHYSICAL THERAPY | Age: 55
End: 2020-09-28
Payer: MEDICARE

## 2020-09-28 RX ORDER — BLOOD SUGAR DIAGNOSTIC
STRIP MISCELLANEOUS
Qty: 100 STRIP | Refills: 6 | Status: SHIPPED | OUTPATIENT
Start: 2020-09-28

## 2020-09-28 RX ORDER — BLOOD-GLUCOSE METER
EACH MISCELLANEOUS
Qty: 1 EACH | Refills: 0 | Status: SHIPPED | OUTPATIENT
Start: 2020-09-28

## 2020-09-28 RX ORDER — LANCETS
EACH MISCELLANEOUS
Qty: 100 EACH | Refills: 6 | Status: SHIPPED | OUTPATIENT
Start: 2020-09-28

## 2020-09-28 NOTE — TELEPHONE ENCOUNTER
Patient states her meter is not covered. Please send in new RX for new Meter, test strips and lancets for [employer] meter. This meter is covered by insurance.     [address]

## 2020-09-29 ENCOUNTER — TELEPHONE (OUTPATIENT)
Dept: ENDOCRINOLOGY | Age: 55
End: 2020-09-29

## 2020-09-29 NOTE — PROGRESS NOTES
Notify that the blood glucose is extremely high, A1c is 11.8, slight kidney damage is seen  Increase activity, take medications consistently, there is no improvement in the blood glucose she has to start insulin also.

## 2020-09-29 NOTE — TELEPHONE ENCOUNTER
Per Dr. Tanmay Sheldon, informed pt of result note, as noted above. Pt verbalized understanding with no further questions or concerns at this time.

## 2020-09-29 NOTE — TELEPHONE ENCOUNTER
----- Message from Milagros Anne MD sent at 9/28/2020  9:59 PM EDT -----  Notify that the blood glucose is extremely high, A1c is 11.8, slight kidney damage is seen  Increase activity, take medications consistently, there is no improvement in the blood glucose she has to start insulin also.

## 2020-09-30 ENCOUNTER — HOSPITAL ENCOUNTER (OUTPATIENT)
Dept: PHYSICAL THERAPY | Age: 55
Discharge: HOME | End: 2020-09-30
Payer: MEDICARE

## 2020-09-30 PROCEDURE — 97014 ELECTRIC STIMULATION THERAPY: CPT

## 2020-09-30 PROCEDURE — 97110 THERAPEUTIC EXERCISES: CPT

## 2020-09-30 NOTE — PROGRESS NOTES
PT DAILY TREATMENT NOTE - [facility] 2-15    Patient Name: Lois Calderon  Date:2020  : 1965  [x]  Patient  Verified  Payor: [employer] / Plan: [facility] / Product Type: Managed Care Medicare /    In time: 1:07pm  Out time: 02:05pm    Total Treatment Time (min): 58 min  Total Timed Codes (min): 38 min  1:1 Treatment Time ( only): 38 min  Visit #: 11    Treatment Area: Low back pain [M54.5]  Pain in left shoulder [M25.512]    Treated lower back     SUBJECTIVE  Pain Level (0-10 scale): 5/10 in lower back  Any medication changes, allergies to medications, adverse drug reactions, diagnosis change, or new procedure performed?: [x] No    [] Yes (see summary sheet for update)  Subjective functional status/changes:   [] No changes reported  Patient stated lower back is feeling slight better. Patient continues to report R knee buckling but no falls. OBJECTIVE    Modality rationale: decrease pain and increase tissue extensibility to improve the patients ability to improve mobility and decreased pain.     Min Type Additional Details      20 [x] Estim: []Att   [x]Unatt    []TENS instruct                  [x]IFC  []Premod   []NMES                     []Other:  []w/US   []w/ice   []w/heat  Position:   Location:        []  Traction: [] Cervical       []Lumbar                       [] Prone          []Supine                       []Intermittent   []Continuous Lbs:  [] before manual  [] after manual  []w/heat    []  Ultrasound: []Continuous   [] Pulsed                       at: []1MHz   []3MHz Location:  W/cm2:    [] Paraffin         Location:   []w/heat    []  Ice     []  Heat  []  Ice massage Position:  Location:    []  Laser  []  Other: Position:  Location:      []  Vasopneumatic Device Pressure:       [] lo [] med [] hi   Temperature:      [] Skin assessment post-treatment:  []intact []redness- no adverse reaction    []redness  adverse reaction:     * Decline heat   38 min Therapeutic Exercise:  [x] See flow sheet :   Rationale: increase ROM, increase strength, improve coordination, improve balance and increase proprioception to improve the patients ability to increase her upright posture and improve ambulation tolerance without LBP. With   [x] TE   [] TA   [] neuro   [] other: Patient Education: [x] Review HEP    [x] Progressed/Changed HEP based on:   [] positioning   [] body mechanics   [] transfers   [] heat/ice application    [x] other: due to LBP and discomfort. Other Objective/Functional Measures:  Exercises and ES to lower back. Pain Level (0-10 scale) post treatment: 4/10 slight better. ASSESSMENT/Changes in Function:   Patient slowly been returning to her previous exercise program (prior to fall). She is tolerating activities and reports no back pain with exercises. Patient continues to be limited by knee pain and decrease balance. We have not been able to progress to more standing weight bearing core stabilization and LE strengthening exercies due to knee instability and frequent buckling. Therapist encouraged patient to follow up with MD regarding knee pain, however patient stated she will after her back and left shoulder get better. Therapist will discuss with patient regarding progressing to walker/rolator to ease mobility. Patient will continue to benefit from skilled PT services to modify and progress therapeutic interventions, address functional mobility deficits, address ROM deficits, address strength deficits, analyze and address soft tissue restrictions, analyze and cue movement patterns, analyze and modify body mechanics/ergonomics, assess and modify postural abnormalities and instruct in home and community integration to attain remaining goals. []  See Plan of Care  [x]  See progress note/recertification  []  See Discharge Summary         Progress towards goals / Updated goals:    STG  1. Patient will be independent in HEP within 1-2 weeks. [x] Met [] Not met [] Partially met   2. Patient will increased quad length flexibility by 10-20 deg to reduce pain [] Met [] Not met [x] Partially met   3. Patient will be able to perform sit<-> stand transfers with decreased pain in 3-4 weeks. [] Met [] Not met [x] Partially met   4. Patient will be able to perform SLS >10sec to improve balance within 3-4 weeks. [] Met [x] Not met [] Partially met     LTG   1. Patient will be able to ambulate 150ft without difficulty and pain with LARD within 4-8 weeks. [] Met [] Not met [x] Partially met   2. Patient will be able to negotiate stairs without difficulty within 4-8 weeks. [] Met [] Not met [x] Partially met Back /knee   3. Patient will be able to stand >30 min without difficulty within 4-8 weeks.  [] Met [] Not met [x] Partially met  Back /knee limiting       PLAN  [x]  Upgrade activities as tolerated     [x]  Continue plan of care  []  Update interventions per flow sheet       []  Discharge due to:_  []  Other:_      Juancho Monique, PT 9/30/2020

## 2020-09-30 NOTE — TELEPHONE ENCOUNTER
Patient states that her insurance does not cover the test strips for the meter called in to [facility]. Please call patient to discuss.

## 2020-09-30 NOTE — TELEPHONE ENCOUNTER
Informed pt she is to contact insurance to see what meter is covered. Asked that she call the office back with the name.

## 2020-10-01 ENCOUNTER — OP HISTORICAL/CONVERTED ENCOUNTER (OUTPATIENT)
Dept: OTHER | Age: 55
End: 2020-10-01

## 2020-10-02 ENCOUNTER — HOSPITAL ENCOUNTER (OUTPATIENT)
Dept: PHYSICAL THERAPY | Age: 55
Discharge: HOME | End: 2020-10-02
Payer: MEDICARE

## 2020-10-02 PROCEDURE — 97110 THERAPEUTIC EXERCISES: CPT

## 2020-10-02 NOTE — PROGRESS NOTES
PT DAILY TREATMENT NOTE - [facility] 2-15    Patient Name: Elder Arriola  Date:10/2/2020  : 1965  [x]  Patient  Verified  Payor: Shreyas Duncan / Plan: [employer] / Product Type: Managed Care Medicare /    In time: 10:34pm  Out time: 11:15 am  Total Treatment Time (min): 42 min  Total Timed Codes (min): 42 min  1:1 Treatment Time ( only): 42 min  Visit #: 12    Treatment Area: Low back pain [M54.5]  Pain in left shoulder [M25.512]     Treated lower back 10/2    SUBJECTIVE  Pain Level (0-10 scale): 4/10 in lower back  Any medication changes, allergies to medications, adverse drug reactions, diagnosis change, or new procedure performed?: [x] No    [] Yes (see summary sheet for update)  Subjective functional status/changes:   [] No changes reported  Patient stated LBP is getting better, she continues to report R knee pain and buckling but no recent falls. Patient stated she has an appointment with MD to f/u on her hips which were her primariy concern with her lower back. Patient stated hip pain is still there. OBJECTIVE    Modality rationale: decrease pain and increase tissue extensibility to improve the patients ability to improve mobility and decreased pain.     Min Type Additional Details     [] Estim: []Att   []Unatt    []TENS instruct                  []IFC  []Premod   []NMES                     []Other:  []w/US   []w/ice   []w/heat  Position:   Location:        []  Traction: [] Cervical       []Lumbar                       [] Prone          []Supine                       []Intermittent   []Continuous Lbs:  [] before manual  [] after manual  []w/heat    []  Ultrasound: []Continuous   [] Pulsed                       at: []1MHz   []3MHz Location:  W/cm2:    [] Paraffin         Location:   []w/heat    []  Ice     []  Heat  []  Ice massage Position:  Location:    []  Laser  []  Other: Position:  Location:      []  Vasopneumatic Device Pressure:       [] lo [] med [] hi   Temperature: [] Skin assessment post-treatment:  []intact []redness- no adverse reaction    []redness  adverse reaction:   * Decline heat or ES  42 min Therapeutic Exercise:  [x] See flow sheet :   Rationale: increase ROM, increase strength, improve coordination, improve balance and increase proprioception to improve the patients ability to increase her upright posture and improve ambulation tolerance without LBP. With   [x] TE   [] TA   [] neuro   [] other: Patient Education: [x] Review HEP    [x] Progressed/Changed HEP based on:   [] positioning   [] body mechanics   [] transfers   [] heat/ice application    [x] other: due to LBP and discomfort. Other Objective/Functional Measures:  Started to progress with exercises as patient lower back pain is subsiding. Added bridges with ball and hip abd with GTB and Hip IR/ER with OTB. Pain Level (0-10 scale) post treatment: 4/10 same but reports feeling slight better. ASSESSMENT/Changes in Function:   Patient tolerated activities well reports no back /hip pain with exercises. Patient encouraged to use rollator to improve mobility due to patient continues to be limited by knee pain and decrease balance. We have not been able to progress to more standing weight bearing core stabilization and LE strengthening exercies due to knee instability and frequent buckling. Patient continues to present with difficulty with sit<->Stand and increase time to get into full standing due to knee pain. Patient will continue to benefit from skilled PT services to modify and progress therapeutic interventions, address functional mobility deficits, address ROM deficits, address strength deficits, analyze and address soft tissue restrictions, analyze and cue movement patterns, analyze and modify body mechanics/ergonomics, assess and modify postural abnormalities and instruct in home and community integration to attain remaining goals.      []  See Plan of Care  []  See progress note/recertification  []  See Discharge Summary         Progress towards goals / Updated goals:    STG  1. Patient will be independent in HEP within 1-2 weeks. [x] Met [] Not met [] Partially met   2. Patient will increased quad length flexibility by 10-20 deg to reduce pain [] Met [] Not met [x] Partially met   3. Patient will be able to perform sit<-> stand transfers with decreased pain in 3-4 weeks. [] Met [] Not met [x] Partially met   4. Patient will be able to perform SLS >10sec to improve balance within 3-4 weeks. [] Met [x] Not met [] Partially met     LTG   1. Patient will be able to ambulate 150ft without difficulty and pain with LARD within 4-8 weeks. [] Met [] Not met [x] Partially met   2. Patient will be able to negotiate stairs without difficulty within 4-8 weeks. [] Met [] Not met [x] Partially met Back /knee   3. Patient will be able to stand >30 min without difficulty within 4-8 weeks.  [] Met [] Not met [x] Partially met  Back /knee limiting       PLAN  [x]  Upgrade activities as tolerated     [x]  Continue plan of care  []  Update interventions per flow sheet       []  Discharge due to:_  []  Other:_      Jade Gutierrez, PT 10/2/2020

## 2020-10-07 ENCOUNTER — HOSPITAL ENCOUNTER (OUTPATIENT)
Dept: PHYSICAL THERAPY | Age: 55
Discharge: HOME | End: 2020-10-07
Payer: MEDICARE

## 2020-10-07 PROCEDURE — 97110 THERAPEUTIC EXERCISES: CPT

## 2020-10-07 PROCEDURE — 97140 MANUAL THERAPY 1/> REGIONS: CPT

## 2020-10-07 PROCEDURE — 97014 ELECTRIC STIMULATION THERAPY: CPT

## 2020-10-07 NOTE — PROGRESS NOTES
PT DAILY TREATMENT NOTE - [facility] -15    Patient Name: Nickie Lin  Date:10/7/2020  : 1965  [x]  Patient  Verified  Payor: Kala Castro / [facility] / Product Type: Managed Care Medicare /    In time: 01:05pm  Out time: 0207pm  Total Treatment Time (min): 45 min  Total Timed Codes (min):25min  1:1 Treatment Time ( only): 25min  Visit #: 13    Treatment Area: Low back pain [M54.5]  Pain in left shoulder [M25.512]     Treated lower back 10/2    SUBJECTIVE  Pain Level (0-10 scale): 10/10 in lower back  Any medication changes, allergies to medications, adverse drug reactions, diagnosis change, or new procedure performed?: [x] No    [] Yes (see summary sheet for update)  Subjective functional status/changes:   [] No changes reported  Patient stated she fell 2 times this morning and land landed on her buttocks. She reports she was doing better until she woke up this morning with severe back pain. She does not remember doing anything to cause the increase in pain. Calixto Whiteside \" My hips just gave out on me . \"     OBJECTIVE    Modality rationale: decrease pain and increase tissue extensibility to improve the patients ability to improve mobility and decreased pain.     Min Type Additional Details    20 [x] Estim: []Att   []Unatt    []TENS instruct                  [x]IFC  []Premod   []NMES                     []Other:  []w/US   []w/ice   [x]w/heat  Position: prone  Location: lumbar       []  Traction: [] Cervical       []Lumbar                       [] Prone          []Supine                       []Intermittent   []Continuous Lbs:  [] before manual  [] after manual  []w/heat    []  Ultrasound: []Continuous   [] Pulsed                       at: []1MHz   []3MHz Location:  W/cm2:    [] Paraffin         Location:   []w/heat    []  Ice     []  Heat  []  Ice massage Position:  Location:    []  Laser  []  Other: Position:  Location:      []  Vasopneumatic Device Pressure:       [] lo [] med [] hi Temperature:      [] Skin assessment post-treatment:  []intact []redness- no adverse reaction    []redness  adverse reaction:   * Decline heat or ES  15 min Therapeutic Exercise:  [x] See flow sheet :   Rationale: increase ROM, increase strength, improve coordination, improve balance and increase proprioception to improve the patients ability to increase her upright posture and improve ambulation tolerance without LBP. 10 min Manual Therapy: Piriformis R/L    Rationale: decrease pain, increase ROM, increase tissue extensibility and decrease trigger points to improve the patients ability to ambulate without pain  With   [x] TE   [] TA   [] neuro   [] other: Patient Education: [x] Review HEP    [x] Progressed/Changed HEP based on:   [] positioning   [] body mechanics   [] transfers   [] heat/ice application    [x] other: due to LBP and discomfort. Other Objective/Functional Measures:  Decrease in exercises due to pain. Added Myofascial release to Sage Piriformis muscle and stretches. Pain Level (0-10 scale) post treatment: 8/10 same but reports feeling slight better. ASSESSMENT/Changes in Function:   Patient reporting falling 2 times today. Reports she woke with severe back pain. She ambulated into department with spc. She could not tolerate exercises or stretches. Patient will continue to benefit from skilled PT services to modify and progress therapeutic interventions, address functional mobility deficits, address ROM deficits, address strength deficits, analyze and address soft tissue restrictions, analyze and cue movement patterns, analyze and modify body mechanics/ergonomics, assess and modify postural abnormalities and instruct in home and community integration to attain remaining goals. []  See Plan of Care  []  See progress note/recertification  []  See Discharge Summary         Progress towards goals / Updated goals:    STG  1. Patient will be independent in HEP within 1-2 weeks. [x] Met [] Not met [] Partially met   2. Patient will increased quad length flexibility by 10-20 deg to reduce pain [] Met [] Not met [x] Partially met   3. Patient will be able to perform sit<-> stand transfers with decreased pain in 3-4 weeks. [] Met [] Not met [x] Partially met   4. Patient will be able to perform SLS >10sec to improve balance within 3-4 weeks. [] Met [x] Not met [] Partially met     LTG   1. Patient will be able to ambulate 150ft without difficulty and pain with LARD within 4-8 weeks. [] Met [] Not met [x] Partially met   2. Patient will be able to negotiate stairs without difficulty within 4-8 weeks. [] Met [] Not met [x] Partially met Back /knee   3. Patient will be able to stand >30 min without difficulty within 4-8 weeks.  [] Met [] Not met [x] Partially met  Back /knee limiting       PLAN  [x]  Upgrade activities as tolerated     [x]  Continue plan of care  []  Update interventions per flow sheet       []  Discharge due to:_  []  Other:_      Karime Suarez 10/7/2020

## 2020-10-14 ENCOUNTER — HOSPITAL ENCOUNTER (OUTPATIENT)
Dept: PHYSICAL THERAPY | Age: 55
Discharge: HOME | End: 2020-10-14
Payer: MEDICARE

## 2020-10-14 PROCEDURE — 97110 THERAPEUTIC EXERCISES: CPT

## 2020-10-14 PROCEDURE — 97140 MANUAL THERAPY 1/> REGIONS: CPT

## 2020-10-14 NOTE — PROGRESS NOTES
PT DAILY TREATMENT NOTE - [facility] -15    Patient Name: Bebe Bautista  Date:10/14/2020  : 1965  [x]  Patient  Verified  Payor: David Hashimoto / Plan: [employer] / Product Type: Managed Care Medicare /    In time: 01:03pm  Out time: 02:08 pm  Total Treatment Time (min): 60 min  Total Timed Codes (min):45min  1:1 Treatment Time ( only): 45 min  Visit #: 14    Treatment Area: Low back pain [M54.5]  Pain in left shoulder [M25.512]     Treated lower back 10/14    SUBJECTIVE  Pain Level (0-10 scale): 5/10 in lower back  Any medication changes, allergies to medications, adverse drug reactions, diagnosis change, or new procedure performed?: [x] No    [] Yes (see summary sheet for update)  Subjective functional status/changes:   [] No changes reported  Patient stated she's feeling slight better today no more recent falls. She didn't follow up with MD. Patient reports she went to hip specialist who told her she will need surgery in hips due to OA. OBJECTIVE    Modality rationale: decrease pain and increase tissue extensibility to improve the patients ability to improve mobility and decreased pain.     Min Type Additional Details    20 [x] Estim: []Att   []Unatt    []TENS instruct                  [x]IFC  []Premod   []NMES                     []Other:  []w/US   []w/ice   [x]w/heat  Position: prone  Location: lumbar       []  Traction: [] Cervical       []Lumbar                       [] Prone          []Supine                       []Intermittent   []Continuous Lbs:  [] before manual  [] after manual  []w/heat    []  Ultrasound: []Continuous   [] Pulsed                       at: []1MHz   []3MHz Location:  W/cm2:    [] Paraffin         Location:   []w/heat    []  Ice     []  Heat  []  Ice massage Position:  Location:    []  Laser  []  Other: Position:  Location:      []  Vasopneumatic Device Pressure:       [] lo [] med [] hi   Temperature:      [x] Skin assessment post-treatment:  [x]intact []redness- no adverse reaction    []redness  adverse reaction:     30 min Therapeutic Exercise:  [x] See flow sheet :   Rationale: increase ROM, increase strength, improve coordination, improve balance and increase proprioception to improve the patients ability to increase her upright posture and improve ambulation tolerance without LBP. Therapeutic Exercises:   Dx:   Precautions:    Visit #:     Exercises   Set Reps                            Comments    Bike L3 x 6 min      Slant board L1 x 2 min      PPT +TA 3 10    Marching with TA 3 10    Hip abd supine  3 10  Progressed to green TB  Patient can't tolerate sidelying    SLR - 2 way   Flex/ext  1 15 with 1.5# BLE    Hamstring curl 1  15 with 1.5#    Quad stretch 2 x 30sec     Prone alt arm x10 bilateral      Prone mini press ups x15      Bridging  2 10  With bacll            15 min Manual Therapy: Lower back L3-S1 and Piriformis R/L    Rationale: decrease pain, increase ROM, increase tissue extensibility and decrease trigger points to improve the patients ability to ambulate without pain    With   [x] TE   [] TA   [] neuro   [] other: Patient Education: [x] Review HEP    [x] Progressed/Changed HEP based on:   [] positioning   [] body mechanics   [] transfers   [] heat/ice application    [x] other: due to LBP and discomfort. Other Objective/Functional Measures:    Pain Level (0-10 scale) post treatment:  4/10 reports feeling slight better. ASSESSMENT/Changes in Function:   Patient tolerated progressing and activities well, no limitations reported.  Patient will continue to benefit from skilled PT services to modify and progress therapeutic interventions, address functional mobility deficits, address ROM deficits, address strength deficits, analyze and address soft tissue restrictions, analyze and cue movement patterns, analyze and modify body mechanics/ergonomics, assess and modify postural abnormalities and instruct in home and community integration to attain remaining goals. []  See Plan of Care  []  See progress note/recertification  []  See Discharge Summary         Progress towards goals / Updated goals:    STG  1. Patient will be independent in HEP within 1-2 weeks. [x] Met [] Not met [] Partially met   2. Patient will increased quad length flexibility by 10-20 deg to reduce pain [] Met [] Not met [x] Partially met   3. Patient will be able to perform sit<-> stand transfers with decreased pain in 3-4 weeks. [] Met [] Not met [x] Partially met   4. Patient will be able to perform SLS >10sec to improve balance within 3-4 weeks. [] Met [x] Not met [] Partially met     LTG   1. Patient will be able to ambulate 150ft without difficulty and pain with LARD within 4-8 weeks. [] Met [] Not met [x] Partially met   2. Patient will be able to negotiate stairs without difficulty within 4-8 weeks. [] Met [] Not met [x] Partially met Back /knee   3. Patient will be able to stand >30 min without difficulty within 4-8 weeks.  [] Met [] Not met [x] Partially met  Back /knee limiting       PLAN  [x]  Upgrade activities as tolerated     [x]  Continue plan of care  []  Update interventions per flow sheet       []  Discharge due to:_  []  Other:_      Allison Woodson, PT 10/14/2020

## 2020-10-16 ENCOUNTER — APPOINTMENT (OUTPATIENT)
Dept: PHYSICAL THERAPY | Age: 55
End: 2020-10-16
Payer: MEDICARE

## 2020-10-23 ENCOUNTER — APPOINTMENT (OUTPATIENT)
Dept: PHYSICAL THERAPY | Age: 55
End: 2020-10-23
Payer: MEDICARE

## 2020-11-25 ENCOUNTER — TRANSCRIBE ORDER (OUTPATIENT)
Dept: SCHEDULING | Age: 55
End: 2020-11-25

## 2020-11-25 ENCOUNTER — HOSPITAL ENCOUNTER (OUTPATIENT)
Dept: PHYSICAL THERAPY | Age: 55
Discharge: HOME | End: 2020-11-25
Payer: MEDICARE

## 2020-11-25 DIAGNOSIS — K59.00 CONSTIPATION: Primary | ICD-10-CM

## 2020-11-25 DIAGNOSIS — R10.9 PAIN, ABDOMINAL: ICD-10-CM

## 2020-11-25 PROCEDURE — 97110 THERAPEUTIC EXERCISES: CPT

## 2020-11-25 PROCEDURE — 97162 PT EVAL MOD COMPLEX 30 MIN: CPT

## 2020-11-25 PROCEDURE — 97140 MANUAL THERAPY 1/> REGIONS: CPT

## 2020-11-25 NOTE — PROGRESS NOTES
[address] [address] [address] [facility] [address] [facility], [address] [place] [address]  Ph: [phone]    Fax: [phone]    Initial Evaluation/Plan of Care/Statement of Necessity for Physical Therapy Services     Patient name: Princess Landeros   : 1965  [x]  Patient  Verified Provider#: [id number]    Start of Care: 2020         Referral source: [place], [place] Return visit to MD: Beginning of December      Medical/Treatment Diagnosis: Pain in left shoulder [M25.512]  Encounter for other specified aftercare [Z51.89]    Payor: Kenneth Mendes / Plan: [employer] / Product Type: Managed Care Medicare /       Prior Hospitalization: see medical history     Comorbidities: See intake   Prior Level of Function: Modified independent with cane  Medications: Verified on Patient Summary List          Patient / Family readiness to learn indicated by: trying to perform skills and interest  Persons(s) to be included in education: patient (P)  Barriers to Learning/Limitations: None  Patient Self Reported Health Status: fair  Rehabilitation Potential: good  Previous Treatment/Compliance: Initiated therapy( only evaluation was done)  but then Orthopedic stated she needs Surgery. PMHx/Surgical Hx: N/A  Work Hx: Not working at this time. Living Situation: Patient lives with family, with few steps to enter. Barriers to progress: N/A  Motivation: Good  Substance use: N/A  Cognition: A & O x *4  Onset Date: 2020   In time:2:25pm   Out time:3:25pm Total Treatment Time (min): 60  Total Timed Codes (min): 10 1:1 Treatment Time ( only): 50  Visit #: 1 Visit count could not be calculated. Make sure you are using a visit which is associated with an episode. SUBJECTIVE  Patient is s/p left shoulder arthroscopy, SAD and rotator cuff repair on 2020. Patient is going to be 3 weeks post op in 2 days. Patient is going to have her second f/u first week of December.  She was not provided any exercises and she arrived to clinic with sling and MD stated she needs to be wearing sling for 4-6 weeks post op. Patient stated that her pain is mostly localized to the entire shoulder with every movement, she still had her stiches on. She c/o constant sharp and achy pain. Activities that produce pain - movement and activities that subside pain icing, medication and keeping shoulder in sling and not moving it. Patient reports functional limitations with sleeping she can't get comfortable at night, difficulty getting dress and showering. 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 William Galarza PAIN:  Area of pain: Left shoulder   Pain Level (0-10 scale)  At rest: 8/10  With activity: 10/10    Worst: 10/10    Least: 5/10   Pain Level (0-10 scale) pre treatment: 9/10  Pain Level (0-10 scale) post treatment: 8/10  OBJECTIVE  Objective/Functional Measures including ROM/MMT  Physical Findings   Ortho:   Posture:  Rounded shoulders, slight shoulder hiked  Palpation: swollen and tender to touch around shoulder girdle.     Gross findings: came in with a sling on left shoulder, Incision CDI with 4 sutures on, patient is right headed   Specific joints: *normal values in ()    SHOULDER                                         AROM   PROM            MMT   R L R L R L   Flexion (180)   70       Extension (60)         Abduction (180)   40       Adduction (0)         IR (70)   35      ER (90)   25       Horizontal Abduction (130)         Horizontal Adduction (45)         Additional comments: PROM only at evaluation (s/p RTC repair)    ELBOW                             AROM                             PROM                             MMT   R L R L R L   Flexion (150) WNL        Extension (0) -35         Additional comments: increase tightness noted with elbow extension slight firm end feel        SPINE: CERVICAL                                                ROM                                  Strength   Flexion  WNL    Extension WNL     Protraction     Retraction        R L R L   Lateral Flexion (45) 35 30      Rotation (90) WNL  WNL      Additional comments: Reports slight tightness in UT    Strength: Good bilateral   Wrist AROM /MMT WNL     Modality rationale: decrease edema, decrease inflammation, decrease pain, increase tissue extensibility and increase muscle contraction/control to improve the patients ability to initiate shoulder movement   Min Type Additional Details    [] Estim: []UnAtt   []Att       []TENS instruct                  []IFC  []Premod   []NMES []w/US   []w/ice   []w/heat  Position:                                     Location:   10 [x]  Ice     []  Heat  []  Ice massage Position:supine  Location:left shoulder     []  Traction: [] Cervical       []Lumbar                       []Intermittent   []Continuous                     Lbs:  []W/heat               []W/heat and Estim    []  Ultrasound: []Continuous   [] Pulsed at:                           []1MHz   []3MHz Location:  W/cm2:    [x] Skin assessment post-treatment:   [x]intact []redness- no adverse reaction   []redness  adverse reaction:   10 min Therapeutic Exercise:  [x] See flow sheet :scapula retraction, shoulder shrugs, elbow flexon/extension ROM, towel roll squeeze, cervical ROM and UT/levator stretches.     Rationale: increase ROM, increase strength, improve coordination and increase proprioception to improve the patients ability to lift shoulder  10 min Manual Therapy: PROM to left shoulder    Rationale: decrease pain, increase tissue extensibility, decrease edema  and increase postural awareness to improve the patients ability to move shoulder           With   [x] TE   [] TA   [] neuro Patient Education: [x] Review HEP    [] Progressed/Changed HEP based on:   [x] positioning   [] body mechanics   [] transfers   [] heat/ice application    [] other:       ASSESSMENT/Changes in Function:   Pt is a 49 y/o female presents to outpatient PT services s/p left shoulder arthroscopy, SAD, and rotator cuff repair on 11/6/2020. Patient is almost 3 weeks post op. Patient presents with decreased PROM, decreased shoulder strength, limited neck/elbow AROM, decreased flexibility and neck tightness. Pt will benefit from skilled PT services to allow improvement of passive and active range of motion per protocol, improve quality of movement and cue proper biomechanics through the shoulder girdle, progress to isometric strengthening per protocol, and allow return to PLOF including work duties and ADLs. Problem List/Impairments: pain affecting function, decrease ROM, decrease strength, edema affecting function, impaired gait/ balance, decrease ADL/ functional abilitiies, decrease activity tolerance, decrease flexibility/ joint mobility and decrease transfer abilities  Frequency / Duration: Patient to be seen 2 times per week for 8-10  weeks. Certification Period: 11/25/2020- 2/25/2021  Treatment Plan may include any combination of the following: Therapeutic exercise, Neuromuscular re-education, Physical agent/modality, Gait/balance training, Manual therapy, Patient education, Self Care training and Functional mobility training    Patient/ Caregiver education and instruction: exercises  Patient Goal (s):  Regain shoulder movement and strength       Short Term Goals: To be accomplished in 3-4  treatments:  1. Patient will be independent with HEP to augment POC in 1 -2 treatments   2. Patient will demonstrate 80 deg passive forward flexion and 30 deg passive ER   3. Patient will demonstrate full elbow extension pain free       Long Term Goals: To be accomplished in 18  treatments:  1. Patient will demonstrate full PROM WNL in all direction   2. Patient pain will subside to </= 5/10   3.  Patient will demonstrate AAFE and progress to AFE with decreased pain and less discomfort  4. Pt will report ability to use the L arm in ADLs including dressing and bathing without pain or difficulty. [x]  Plan of care has been reviewed with PTA. The Plan of Care is based on information from the initial evaluation. Sandy Li, PT 11/25/2020   ________________________________________________________________________    I certify that the above Therapy Services are being furnished while the patient is under my care. I agree with the treatment plan and certify that this therapy is necessary.     [de-identified] Signature:____________________  Date:____________Time: _________

## 2020-12-07 ENCOUNTER — HOSPITAL ENCOUNTER (OUTPATIENT)
Dept: ULTRASOUND IMAGING | Age: 55
Discharge: HOME | End: 2020-12-07
Attending: INTERNAL MEDICINE
Payer: MEDICARE

## 2020-12-07 DIAGNOSIS — R10.9 PAIN, ABDOMINAL: ICD-10-CM

## 2020-12-07 DIAGNOSIS — K59.00 CONSTIPATION: ICD-10-CM

## 2020-12-07 PROCEDURE — 76700 US EXAM ABDOM COMPLETE: CPT

## 2020-12-08 LAB
ALBUMIN SERPL-MCNC: 4.3 G/DL (ref 3.8–4.9)
ALBUMIN/CREAT UR: 18 MG/G CREAT (ref 0–29)
ALBUMIN/GLOB SERPL: 2 {RATIO} (ref 1.2–2.2)
ALP SERPL-CCNC: 97 IU/L (ref 39–117)
ALT SERPL-CCNC: 12 IU/L (ref 0–32)
AST SERPL-CCNC: 16 IU/L (ref 0–40)
BILIRUB SERPL-MCNC: 0.5 MG/DL (ref 0–1.2)
BUN SERPL-MCNC: 14 MG/DL (ref 6–24)
BUN/CREAT SERPL: 16 (ref 9–23)
CALCIUM SERPL-MCNC: 8.5 MG/DL (ref 8.7–10.2)
CHLORIDE SERPL-SCNC: 110 MMOL/L (ref 96–106)
CO2 SERPL-SCNC: 21 MMOL/L (ref 20–29)
CREAT SERPL-MCNC: 0.86 MG/DL (ref 0.57–1)
CREAT UR-MCNC: 128 MG/DL
EST. AVERAGE GLUCOSE BLD GHB EST-MCNC: 194 MG/DL
GLOBULIN SER CALC-MCNC: 2.2 G/DL (ref 1.5–4.5)
GLUCOSE SERPL-MCNC: 134 MG/DL (ref 65–99)
HBA1C MFR BLD: 8.4 % (ref 4.8–5.6)
LDLC SERPL DIRECT ASSAY-MCNC: 69 MG/DL (ref 0–99)
MICROALBUMIN UR-MCNC: 23.1 UG/ML
POTASSIUM SERPL-SCNC: 4.4 MMOL/L (ref 3.5–5.2)
PROT SERPL-MCNC: 6.5 G/DL (ref 6–8.5)
SODIUM SERPL-SCNC: 144 MMOL/L (ref 134–144)

## 2020-12-09 ENCOUNTER — OFFICE VISIT (OUTPATIENT)
Dept: OBGYN CLINIC | Age: 55
End: 2020-12-09
Payer: MEDICARE

## 2020-12-09 VITALS
HEIGHT: 71 IN | SYSTOLIC BLOOD PRESSURE: 114 MMHG | BODY MASS INDEX: 41.02 KG/M2 | DIASTOLIC BLOOD PRESSURE: 71 MMHG | WEIGHT: 293 LBS

## 2020-12-09 DIAGNOSIS — N32.81 OVERACTIVE BLADDER: ICD-10-CM

## 2020-12-09 DIAGNOSIS — Z01.419 ROUTINE GYNECOLOGICAL EXAMINATION: Primary | ICD-10-CM

## 2020-12-09 PROCEDURE — G8752 SYS BP LESS 140: HCPCS | Performed by: OBSTETRICS & GYNECOLOGY

## 2020-12-09 PROCEDURE — 3017F COLORECTAL CA SCREEN DOC REV: CPT | Performed by: OBSTETRICS & GYNECOLOGY

## 2020-12-09 PROCEDURE — G8432 DEP SCR NOT DOC, RNG: HCPCS | Performed by: OBSTETRICS & GYNECOLOGY

## 2020-12-09 PROCEDURE — G8427 DOCREV CUR MEDS BY ELIG CLIN: HCPCS | Performed by: OBSTETRICS & GYNECOLOGY

## 2020-12-09 PROCEDURE — G8417 CALC BMI ABV UP PARAM F/U: HCPCS | Performed by: OBSTETRICS & GYNECOLOGY

## 2020-12-09 PROCEDURE — G8754 DIAS BP LESS 90: HCPCS | Performed by: OBSTETRICS & GYNECOLOGY

## 2020-12-09 PROCEDURE — 99213 OFFICE O/P EST LOW 20 MIN: CPT | Performed by: OBSTETRICS & GYNECOLOGY

## 2020-12-09 NOTE — PROGRESS NOTES
Maribel Christopher is a 48 y.o. female who presents today for the following:  Chief Complaint   Patient presents with    New Patient     c/o prolapse of bladder        No Known Allergies    Current Outpatient Medications   Medication Sig    Blood-Glucose Meter (Accu-Chek Lorie Plus Meter) misc TEST BLOOD GLUCOSE TWICE DAILY Dx Code:E 11.65    glucose blood VI test strips (Accu-Chek Lorie Plus test strp) strip TEST BLOOD GLUCOSE TWICE DAILY Dx Code:E 11.65    lancets (Accu-Chek Softclix Lancets) misc TEST BLOOD GLUCOSE TWICE DAILY Dx Code:E 11.65    dulaglutide (Trulicity) 5.22 ZL/9.3 mL sub-q pen 0.5 mL by SubCUTAneous route every seven (7) days.  oxybutynin chloride XL (DITROPAN XL) 5 mg CR tablet Take 1 Tab by mouth daily.  aspirin delayed-release 81 mg tablet Take 81 mg by mouth daily.  metFORMIN (GLUCOPHAGE) 1,000 mg tablet Take 1 Tab by mouth two (2) times daily (with meals). Stop Glipizide    atorvastatin (LIPITOR) 40 mg tablet Take 40 mg by mouth daily.  lisinopril (PRINIVIL, ZESTRIL) 10 mg tablet Take 10 mg by mouth daily. No current facility-administered medications for this visit.         Past Medical History:   Diagnosis Date    CAD (coronary artery disease)     Dr [facility])     Hypertension        Past Surgical History:   Procedure Laterality Date    HX CORONARY STENT PLACEMENT         Family History   Problem Relation Age of Onset    Heart Disease Mother     Hypertension Father     Emphysema Father     Cancer Maternal Aunt        Social History     Socioeconomic History    Marital status:      Spouse name: Not on file    Number of children: Not on file    Years of education: Not on file    Highest education level: Not on file   Occupational History    Not on file   Social Needs    Financial resource strain: Not on file    Food insecurity     Worry: Not on file     Inability: Not on file    Transportation needs     Medical: Not on file Non-medical: Not on file   Tobacco Use    Smoking status: Former Smoker    Smokeless tobacco: Never Used   Substance and Sexual Activity    Alcohol use: Yes     Alcohol/week: 0.0 standard drinks    Drug use: Never    Sexual activity: Not on file   Lifestyle    Physical activity     Days per week: Not on file     Minutes per session: Not on file    Stress: Not on file   Relationships    Social connections     Talks on phone: Not on file     Gets together: Not on file     Attends Oriental orthodox service: Not on file     Active member of club or organization: Not on file     Attends meetings of clubs or organizations: Not on file     Relationship status: Not on file    Intimate partner violence     Fear of current or ex partner: Not on file     Emotionally abused: Not on file     Physically abused: Not on file     Forced sexual activity: Not on file   Other Topics Concern    Not on file   Social History Narrative    Not on file         HPI  48years old patient who came today referred by her PCP for gynecological evaluation. Rule out bladder prolapse. Patient has history of diabetes and overactive bladder. She states having significant improvement after medical treatment. ROS   Review of Systems   Constitutional: Negative. HENT: Negative. Eyes: Negative. Respiratory: Negative. Cardiovascular: Negative. Gastrointestinal: Negative. Genitourinary: Negative. Musculoskeletal: Negative. Skin: Negative. Neurological: Negative. Endo/Heme/Allergies: Negative. Psychiatric/Behavioral: Negative.       /71 (BP 1 Location: Right arm, BP Patient Position: Sitting)   Ht 5' 11\" (1.803 m)   Wt 299 lb (135.6 kg)   BMI 41.70 kg/m²    OBGyn Exam   Constitutional  · Appearance: well-nourished, well developed, alert, in no acute distress    HENT  · Head and Face: appears normal    Neck  · Inspection/Palpation: normal appearance, no masses or tenderness  · Lymph Nodes: no lymphadenopathy present  · Thyroid: gland size normal, nontender, no nodules or masses present on palpation    Chest  · Respiratory Effort: breathing labored  · Auscultation: normal breath sounds    Cardiovascular  · Heart:  · Auscultation: regular rate and rhythm without murmur    Gastrointestinal  · Abdominal Examination: abdomen non-tender to palpation, normal bowel sounds, no masses present  · Liver and spleen: no hepatomegaly present, spleen not palpable  · Hernias: no hernias identified    Genitourinary  · External Genitalia: normal appearance for age, no discharge present, no tenderness present, no inflammatory lesions present, no masses present, no atrophy present  · Vagina: normal vaginal vault without central or paravaginal defects, no discharge present, no inflammatory lesions present, no masses present  · Bladder: non-tender to palpation  · Urethra: appears normal  · Cervix: normal   · Uterus: normal size, shape and consistency  · Adnexa: no adnexal tenderness present, no adnexal masses present  · Perineum: perineum within normal limits, no evidence of trauma, no rashes or skin lesions present  · Anus: anus within normal limits, no hemorrhoids present  · Inguinal Lymph Nodes: no lymphadenopathy present    Skin  · General Inspection: no rash, no lesions identified    Neurologic/Psychiatric  · Mental Status:  · Orientation: grossly oriented to person, place and time  · Mood and Affect: mood normal, affect appropriate    No results found for this visit on 12/09/20. No orders of the defined types were placed in this encounter. 1. Routine gynecological examination      2. Overactive bladder    No significant pelvic organ prolapse noted. In view of physical exam findings and longstanding history of diabetes, patient will not benefit from surgical procedure at this time. Follow-up and Dispositions    · Return if symptoms worsen or fail to improve.

## 2020-12-14 ENCOUNTER — HOSPITAL ENCOUNTER (OUTPATIENT)
Dept: PHYSICAL THERAPY | Age: 55
Discharge: HOME | End: 2020-12-14
Payer: MEDICARE

## 2020-12-14 PROCEDURE — 97110 THERAPEUTIC EXERCISES: CPT

## 2020-12-14 NOTE — PROGRESS NOTES
PT DAILY TREATMENT NOTE - [facility]     Patient Name: Kateryna Sahni  XCYU:  : 1965  [x]  Patient  Verified  Payor: Sarai Galindo / Plan: [employer] / Product Type: Managed Care Medicare /    Treatment Area: Pain in left shoulder [M25.512]  Encounter for other specified aftercare [Z51.89]   Next MD APPT: Dec??? In time:1:00 pm Out time:1:45 pm  Total Treatment Time (min):45  Total Timed Codes (min): 45  1:1 Treatment Time ( [address] only): 45   Visit #: 3/20 Visit count could not be calculated. Make sure you are using a visit which is associated with an episode. SUBJECTIVE  Pain Level (0-10 scale) pre treatment: 5-6/10  Pain Level (0-10 scale) post treatment: 4/10  Any medication changes, allergies to medications, adverse drug reactions, diagnosis change, or new procedure performed?: [x] No    [] Yes (see summary sheet for update)  Subjective functional status/changes:   [] No changes reported  Patient stated she was given the sling 1 month prior to surgery and was not shown how to use it. She came in today with just the sling on and abduction pillow missing. She stated no one has really told her anything . Did ask patient to bring in abduction pillow and we would show her how to apply and wear sling correctly. Patient is just now returning to therapy after receiving authorization from insurance. Patient lost her exercise sheet. OBJECTIVE      45 min Therapeutic Exercise:  [] See flow sheet :   Rationale: increase ROM, increase strength and improve coordination to improve the patients ability to perform ADL's             With   [x] TE   [] TA   [] neuro   [x] other: Patient Education: [x] Review HEP    [] Progressed/Changed HEP based on:   [] positioning   [] body mechanics   [] transfers   [] heat/ice application    [] other: Protocol and wearing sling      Other Objective/Functional Measures: Reviewed HEP and gave patient another HO of exercises.  Added table stretches and demo pendulum exercises    ASSESSMENT/Changes in Function:   Patient returning to therapy after receiving authorization from insurance. Did discuss with patient the importance of wearing abduction pillow and Protocol for her type of surgery. Reviewed exercises due to patient misplaced her HO . She was also given another HO of exercises. Patient is going to bring in her abduction pillow on next visit to show her how to wear it properly. She did better today with PROM and less guarding. Patient will continue to benefit from skilled PT services to modify and progress therapeutic interventions, address functional mobility deficits, address ROM deficits, address strength deficits and analyze and address soft tissue restrictions to attain remaining goals. []  See Plan of Care  []  See progress note/recertification  []  See Discharge Summary       GOALS/Progress towards goals:    Patient/ Caregiver education and instruction: exercises  Patient Goal (s):  Regain shoulder movement and strength         Short Term Goals: To be accomplished in 3-4  treatments:  1. Patient will be independent with HEP to augment POC in 1 -2 treatments [] Met [] Not met [] Partially met   2. Patient will demonstrate 80 deg passive forward flexion and 30 deg passive ER  [] Met [] Not met [] Partially met   3. Patient will demonstrate full elbow extension pain free [] Met [] Not met [] Partially met         Long Term Goals: To be accomplished in 18  treatments:  1. Patient will demonstrate full PROM WNL in all direction [] Met [] Not met [] Partially met   2. Patient pain will subside to </= 5/10 [] Met [] Not met [] Partially met   3. Patient will demonstrate AAFE and progress to AFE with decreased pain and less discomfort [] Met [] Not met [] Partially met   4. Pt will report ability to use the L arm in ADLs including dressing and bathing without pain or difficulty [] Met [] Not met [] Partially met   PLAN  []  Upgrade activities as tolerated     [] Continue plan of care  []  Update interventions per flow sheet       []  Discharge due to:_  []  Other:_      Nida Holder 12/14/2020

## 2020-12-17 ENCOUNTER — APPOINTMENT (OUTPATIENT)
Dept: PHYSICAL THERAPY | Age: 55
End: 2020-12-17
Payer: MEDICARE

## 2020-12-18 ENCOUNTER — OFFICE VISIT (OUTPATIENT)
Dept: ENDOCRINOLOGY | Age: 55
End: 2020-12-18
Payer: MEDICARE

## 2020-12-18 VITALS
RESPIRATION RATE: 16 BRPM | HEIGHT: 71 IN | TEMPERATURE: 97.9 F | HEART RATE: 53 BPM | WEIGHT: 292 LBS | DIASTOLIC BLOOD PRESSURE: 75 MMHG | BODY MASS INDEX: 40.88 KG/M2 | SYSTOLIC BLOOD PRESSURE: 154 MMHG | OXYGEN SATURATION: 100 %

## 2020-12-18 DIAGNOSIS — E66.01 SEVERE OBESITY (BMI 35.0-39.9) WITH COMORBIDITY (HCC): ICD-10-CM

## 2020-12-18 DIAGNOSIS — E78.2 HYPERLIPIDEMIA, MIXED: ICD-10-CM

## 2020-12-18 DIAGNOSIS — E11.40 TYPE 2 DIABETES MELLITUS WITH DIABETIC NEUROPATHY, WITHOUT LONG-TERM CURRENT USE OF INSULIN (HCC): Primary | ICD-10-CM

## 2020-12-18 DIAGNOSIS — I10 ESSENTIAL HYPERTENSION WITH GOAL BLOOD PRESSURE LESS THAN 140/90: ICD-10-CM

## 2020-12-18 PROCEDURE — G8417 CALC BMI ABV UP PARAM F/U: HCPCS | Performed by: INTERNAL MEDICINE

## 2020-12-18 PROCEDURE — 3052F HG A1C>EQUAL 8.0%<EQUAL 9.0%: CPT | Performed by: INTERNAL MEDICINE

## 2020-12-18 PROCEDURE — G8432 DEP SCR NOT DOC, RNG: HCPCS | Performed by: INTERNAL MEDICINE

## 2020-12-18 PROCEDURE — G8753 SYS BP > OR = 140: HCPCS | Performed by: INTERNAL MEDICINE

## 2020-12-18 PROCEDURE — 99214 OFFICE O/P EST MOD 30 MIN: CPT | Performed by: INTERNAL MEDICINE

## 2020-12-18 PROCEDURE — 2022F DILAT RTA XM EVC RTNOPTHY: CPT | Performed by: INTERNAL MEDICINE

## 2020-12-18 PROCEDURE — G8427 DOCREV CUR MEDS BY ELIG CLIN: HCPCS | Performed by: INTERNAL MEDICINE

## 2020-12-18 PROCEDURE — 3017F COLORECTAL CA SCREEN DOC REV: CPT | Performed by: INTERNAL MEDICINE

## 2020-12-18 PROCEDURE — G8754 DIAS BP LESS 90: HCPCS | Performed by: INTERNAL MEDICINE

## 2020-12-18 RX ORDER — SITAGLIPTIN 100 MG/1
TABLET, FILM COATED ORAL
COMMUNITY
Start: 2020-11-18 | End: 2021-07-29

## 2020-12-18 NOTE — LETTER
12/20/2020 Patient: Na Cohn YOB: 1965 Date of Visit: 12/18/2020 JOVANI Long 
[address] [facility] [address] [address] Fax: [phone] Dear JOVANI Long, Thank you for referring Ms. Lara Zapata to [facility] for evaluation. My notes for this consultation are attached. If you have questions, please do not hesitate to call me. I look forward to following your patient along with you. Sincerely, Don Lloyd MD

## 2020-12-18 NOTE — PROGRESS NOTES
Josiane Troy is a 48 y.o. female here for   Chief Complaint   Patient presents with    Diabetes       1. Have you been to the ER, urgent care clinic since your last visit? Hospitalized since your last visit? -no    2. Have you seen or consulted any other health care providers outside of the [address] [place] [address] since your last visit?   Include any pap smears or colon screening.-ortho

## 2020-12-21 ENCOUNTER — HOSPITAL ENCOUNTER (OUTPATIENT)
Dept: PHYSICAL THERAPY | Age: 55
Discharge: HOME | End: 2020-12-21
Payer: MEDICARE

## 2020-12-21 PROCEDURE — 97110 THERAPEUTIC EXERCISES: CPT

## 2020-12-21 PROCEDURE — 97014 ELECTRIC STIMULATION THERAPY: CPT

## 2020-12-21 NOTE — PROGRESS NOTES
PT DAILY TREATMENT NOTE - [facility]     Patient Name: Bebe Bautista  Date:2020  : 1965  [x]  Patient  Verified  Payor: David Hashimoto / Plan: [employer] / Product Type: Managed Care Medicare /    Treatment Area: Pain in left shoulder [M25.512]  Encounter for other specified aftercare [Z51.89]   Next MD APPT: Dec??? In time:1:45 pm Out time:2:53 pm  Total Treatment Time (min):68  Total Timed Codes (min): 45  1:1 Treatment Time ( [address] only): 45  Visit #: 3/20 Visit count could not be calculated. Make sure you are using a visit which is associated with an episode. SUBJECTIVE  Pain Level (0-10 scale) pre treatment: 10/10  Pain Level (0-10 scale) post treatment: 9/10  Any medication changes, allergies to medications, adverse drug reactions, diagnosis change, or new procedure performed?: [x] No    [] Yes (see summary sheet for update)  Subjective functional status/changes:   [] No changes reported  Patient stated she had a telephone conference with the doctor on the 2020. She stated he d/c'ed the sling. She is post 6 weeks. She stated today is not a good day she has pain all over. She has difficulty with standing and getting up from a sitting position due to knee pain. Patient was able to participate in exercises despite pain levels. She reports she is pain all the time with no relief. OBJECTIVE      45 min Therapeutic Exercise:  [] See flow sheet :   Rationale: increase ROM, increase strength and improve coordination to improve the patients ability to perform ADL's             With   [x] TE   [] TA   [] neuro   [x] other: Patient Education: [x] Review HEP    [] Progressed/Changed HEP based on:   [] positioning   [] body mechanics   [] transfers   [] heat/ice application    [] other: Protocol and wearing sling      Other Objective/Functional Measures: Reviewed HEP and gave patient another HO of exercises. Added isometric exercises and pulleys.  Patient PROM Flexion 90 deg, abd 90 deg and ER 5 deg  ASSESSMENT/Changes in Function:   Patient was tighter with PROM and only able to get 90 degrees of flexion and abduction. At first was only able to get to neutral with ER. Improved with cane ER exercises. She reports her knee pain and shoulder pain are the same. She ambulates with a cane but moves very slowly due to knee pain. Patient given a copy of HO of isometric exercises. Added modalities due to high pain levels. Unsure of compliance with HEP. Patient will continue to benefit from skilled PT services to modify and progress therapeutic interventions, address functional mobility deficits, address ROM deficits, address strength deficits and analyze and address soft tissue restrictions to attain remaining goals. []  See Plan of Care  []  See progress note/recertification  []  See Discharge Summary       GOALS/Progress towards goals:    Patient/ Caregiver education and instruction: exercises  Patient Goal (s):  Regain shoulder movement and strength         Short Term Goals: To be accomplished in 3-4  treatments:  1. Patient will be independent with HEP to augment POC in 1 -2 treatments [] Met [] Not met [] Partially met   2. Patient will demonstrate 80 deg passive forward flexion and 30 deg passive ER  [] Met [] Not met [] Partially met   3. Patient will demonstrate full elbow extension pain free [] Met [] Not met [] Partially met         Long Term Goals: To be accomplished in 18  treatments:  1. Patient will demonstrate full PROM WNL in all direction [] Met [] Not met [] Partially met   2. Patient pain will subside to </= 5/10 [] Met [] Not met [] Partially met   3. Patient will demonstrate AAFE and progress to AFE with decreased pain and less discomfort [] Met [] Not met [] Partially met   4. Pt will report ability to use the L arm in ADLs including dressing and bathing without pain or difficulty [] Met [] Not met [] Partially met   PLAN  []  Upgrade activities as tolerated     []  Continue plan of care  []  Update interventions per flow sheet       []  Discharge due to:_  []  Other:_      Sarbjit Stager 12/21/2020

## 2020-12-23 ENCOUNTER — HOSPITAL ENCOUNTER (OUTPATIENT)
Dept: PHYSICAL THERAPY | Age: 55
Discharge: HOME | End: 2020-12-23
Payer: MEDICARE

## 2020-12-23 PROCEDURE — 97014 ELECTRIC STIMULATION THERAPY: CPT

## 2020-12-23 PROCEDURE — 97110 THERAPEUTIC EXERCISES: CPT

## 2020-12-23 PROCEDURE — 97140 MANUAL THERAPY 1/> REGIONS: CPT

## 2020-12-23 NOTE — PROGRESS NOTES
PT DAILY TREATMENT NOTE - [facility]     Patient Name: Cheryle Mcdonnell  XOCD:[id number]  : 1965  [x]  Patient  Verified  Payor: Earnest Hargrove / Plan: [employer] / Product Type: Managed Care Medicare /    Treatment Area: Pain in left shoulder [M25.512]  Encounter for other specified aftercare [Z51.89]   Next MD APPT: Dec??? In time:1:48 pm Out time:2:40 pm  Total Treatment Time (min):48  Total Timed Codes (min): 33  1:1 Treatment Time ( [address] only): 33  Visit #:  Visit count could not be calculated. Make sure you are using a visit which is associated with an episode. SUBJECTIVE  Pain Level (0-10 scale) pre treatment: 15/10    Any medication changes, allergies to medications, adverse drug reactions, diagnosis change, or new procedure performed?: [x] No    [] Yes (see summary sheet for update)  Subjective functional status/changes:   [] No changes reported  Patient was D/C from sling on  by MD, patient stated she lost her balance due to knee buckling she leaned on some boxes and fell on her operated shoulder. Stated her pain level is 15/10. During session noted increased difficulty getting up from chair. 18 min Therapeutic Exercise:  [] See flow sheet :   Rationale: increase ROM, increase strength and improve coordination to improve the patients ability to perform ADL's    15 min Manual Therapy: PROM to left shoulder in all directions    Rationale: decrease pain, increase ROM and increase tissue extensibility to improve the patients ability to gain range in all shoulder planes.      Modality rationale: decrease edema, decrease inflammation, decrease pain, increase tissue extensibility and increase muscle contraction/control to improve the patients ability to raise shoulder without pain   Min Type Additional Details      15 [x] Estim: []Att   [x]Unatt    []TENS instruct                  []IFC  []Premod   []NMES                     []Other:  []w/US   []w/ice [x]w/heat  Position:sitting  Location:left shoulder        []  Traction: [] Cervical       []Lumbar                       [] Prone          []Supine                       []Intermittent   []Continuous Lbs:  [] before manual  [] after manual  []w/heat    []  Ultrasound: []Continuous   [] Pulsed                       at: []1MHz   []3MHz Location:  W/cm2:    [] Paraffin         Location:   []w/heat    []  Ice     []  Heat  []  Ice massage Position:  Location:    []  Laser  []  Other: Position:  Location:      []  Vasopneumatic Device Pressure:       [] lo [] med [] hi   Temperature:      [x] Skin assessment post-treatment:  [x]intact []redness- no adverse reaction    []redness  adverse reaction:                With   [x] TE   [] TA   [] neuro   [x] other: Patient Education: [x] Review HEP    [] Progressed/Changed HEP based on:   [] positioning   [] body mechanics   [] transfers   [] heat/ice application    [] other: Protocol and wearing sling      Other Objective/Functional Measures:  OBJECTIVE  Physical Findings   Ortho:   Posture:  Rounded shoulders  Palpation: mild swollen  Gross findings:  Incision CDI  Specific joints: *normal values in ()    SHOULDER                                         AROM   PROM            MMT   R L R L R L   Flexion (180)   75      Extension (60)         Abduction (180)   50      Adduction (0)         IR (70)   50       ER (90)   30      Horizontal Abduction (130)         Horizontal Adduction (45)         Additional comments: No loss of PROM noted compared to evaluation      Pain Level (0-10 scale) post treatment: 9/10    ASSESSMENT/Changes in Function:   Session initiated with ES and heat due to high level of pain, s/p fall on left shoulder.  Patient was apprehensive with table slide exercises and PROM activities, despite tightness and pain level we did not lose any range compared to evaluation, however patient recommended to follow up with MD if pain continues to be high and notify him about the fall thus if there is any need for imaging they can be done sooner then later. Patient TTP around anterior shoulder capsule and reports difficulty with shoulder flexion/abduction exercises. Patient was able to do scapula and elbow activities without discomfort. Patient was encouraged to continue with HEP as tolerated. Unsure of compliance with HEP. Patient will continue to benefit from skilled PT services to modify and progress therapeutic interventions, address functional mobility deficits, address ROM deficits, address strength deficits and analyze and address soft tissue restrictions to attain remaining goals. []  See Plan of Care  []  See progress note/recertification  []  See Discharge Summary         GOALS/Progress towards goals:    Patient/ Caregiver education and instruction: exercises  Patient Goal (s):  Regain shoulder movement and strength      Short Term Goals: To be accomplished in 3-4  treatments:  1. Patient will be independent with HEP to augment POC in 1 -2 treatments [] Met [] Not met [x] Partially met   2. Patient will demonstrate 80 deg passive forward flexion and 30 deg passive ER  [] Met [] Not met [] Partially met   3. Patient will demonstrate full elbow extension pain free [] Met [] Not met [] Partially met         Long Term Goals: To be accomplished in 18  treatments:  1. Patient will demonstrate full PROM WNL in all direction [] Met [] Not met [] Partially met   2. Patient pain will subside to </= 5/10 [] Met [] Not met [] Partially met   3. Patient will demonstrate AAFE and progress to AFE with decreased pain and less discomfort [] Met [] Not met [] Partially met   4. Pt will report ability to use the L arm in ADLs including dressing and bathing without pain or difficulty [] Met [] Not met [] Partially met   PLAN  []  Upgrade activities as tolerated     []  Continue plan of care  []  Update interventions per flow sheet       []  Discharge due to:_  []  Other:_      Austin Mantel Guillermo, PT 12/23/2020

## 2020-12-28 ENCOUNTER — HOSPITAL ENCOUNTER (EMERGENCY)
Age: 55
Discharge: HOME | End: 2020-12-28
Attending: EMERGENCY MEDICINE | Admitting: EMERGENCY MEDICINE
Payer: MEDICARE

## 2020-12-28 ENCOUNTER — HOSPITAL ENCOUNTER (OUTPATIENT)
Dept: PHYSICAL THERAPY | Age: 55
Discharge: HOME | End: 2020-12-28
Payer: MEDICARE

## 2020-12-28 ENCOUNTER — APPOINTMENT (OUTPATIENT)
Dept: GENERAL RADIOLOGY | Age: 55
End: 2020-12-28
Attending: EMERGENCY MEDICINE
Payer: MEDICARE

## 2020-12-28 VITALS
WEIGHT: 292 LBS | RESPIRATION RATE: 18 BRPM | HEART RATE: 54 BPM | OXYGEN SATURATION: 100 % | BODY MASS INDEX: 40.88 KG/M2 | TEMPERATURE: 99.1 F | HEIGHT: 71 IN | SYSTOLIC BLOOD PRESSURE: 158 MMHG | DIASTOLIC BLOOD PRESSURE: 83 MMHG

## 2020-12-28 DIAGNOSIS — M25.512 PAIN IN JOINT OF LEFT SHOULDER: ICD-10-CM

## 2020-12-28 DIAGNOSIS — M54.50 ACUTE MIDLINE LOW BACK PAIN WITHOUT SCIATICA: ICD-10-CM
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
## 2020-12-28 PROCEDURE — 99281 EMR DPT VST MAYX REQ PHY/QHP: CPT

## 2020-12-28 PROCEDURE — 72100 X-RAY EXAM L-S SPINE 2/3 VWS: CPT

## 2020-12-28 PROCEDURE — 73030 X-RAY EXAM OF SHOULDER: CPT

## 2020-12-28 RX ORDER — HYDROCODONE BITARTRATE AND ACETAMINOPHEN 5; 325 MG/1; MG/1
1 TABLET ORAL
Qty: 10 TAB | Refills: 0 | Status: SHIPPED | OUTPATIENT
Start: 2020-12-28 | End: 2020-12-31

## 2020-12-28 NOTE — ED NOTES
Discharge, RX and Followup reviewed with pt. Pt verbalized understanding. NAD. Ambulated self from ED with discharge paperwork in hand.

## 2020-12-28 NOTE — PROGRESS NOTES
PT DAILY TREATMENT NOTE - [facility]     Patient Name: Toni Ruiz  Date:2020  : 1965  [x]  Patient  Verified  Payor: Robert Phan / Plan: [employer] / Product Type: Managed Care Medicare /    Treatment Area: Pain in left shoulder [M25.512]  Encounter for other specified aftercare [Z51.89]   Next MD APPT: Dec??? In time:1:45pm Out time:2:03 pm  Total Treatment Time (min):15 min  Total Timed Codes (min): 15 min  1:1 Treatment Time ([facility] only): 15min  Visit #: ?/20 ( patient not seen  And sent to ER ) Visit count could not be calculated. Make sure you are using a visit which is associated with an episode. SUBJECTIVE  Pain Level (0-10 scale) pre treatment: 10/10    Any medication changes, allergies to medications, adverse drug reactions, diagnosis change, or new procedure performed?: [x] No    [] Yes (see summary sheet for update)  Subjective functional status/changes:   [] No changes reported  Patient came in today stating she fell again on 2020 due to hip. Patient had previously fallen on 2020. She came in on 2020 and could not tolerate exercises. Patient was advised then to call MD or go to the ER. Patient was advised again today to go to the ER. She could not tolerate palpation to shoulder in sitting or movement to her arm. Discussed with patient to call the surgeon or go to the ER to make sure nothing was broken. Patient has been having difficulty with B knees. She is very guarded with L shoulder and keeps it by her side at all times. She had been making progress prior to the falls. Patient complaining of pain to anterior shoulder along incision down to axillary area.     18 min Therapeutic Exercise:  [] See flow sheet :   Rationale: increase ROM, increase strength and improve coordination to improve the patients ability to perform ADL's    15 min Manual Therapy: PROM to left shoulder in all directions    Rationale: decrease pain, increase ROM and increase tissue extensibility to improve the patients ability to gain range in all shoulder planes.      Modality rationale: decrease edema, decrease inflammation, decrease pain, increase tissue extensibility and increase muscle contraction/control to improve the patients ability to raise shoulder without pain   Min Type Additional Details      15 [x] Estim: []Att   [x]Unatt    []TENS instruct                  []IFC  []Premod   []NMES                     []Other:  []w/US   []w/ice   [x]w/heat  Position:sitting  Location:left shoulder        []  Traction: [] Cervical       []Lumbar                       [] Prone          []Supine                       []Intermittent   []Continuous Lbs:  [] before manual  [] after manual  []w/heat    []  Ultrasound: []Continuous   [] Pulsed                       at: []1MHz   []3MHz Location:  W/cm2:    [] Paraffin         Location:   []w/heat    []  Ice     []  Heat  []  Ice massage Position:  Location:    []  Laser  []  Other: Position:  Location:      []  Vasopneumatic Device Pressure:       [] lo [] med [] hi   Temperature:      [x] Skin assessment post-treatment:  [x]intact []redness- no adverse reaction    []redness  adverse reaction:                With   [x] TE   [] TA   [] neuro   [x] other: Patient Education: [x] Review HEP    [] Progressed/Changed HEP based on:   [] positioning   [] body mechanics   [] transfers   [] heat/ice application    [] other: Protocol and wearing sling      Other Objective/Functional Measures:  OBJECTIVE  Physical Findings   Ortho:   Posture:  Rounded shoulders  Palpation: mild swollen  Gross findings:  Incision CDI  Specific joints: *normal values in ()    SHOULDER                                         AROM   PROM            MMT   R L R L R L   Flexion (180)   75      Extension (60)         Abduction (180)   50      Adduction (0)         IR (70)   50       ER (90)   30      Horizontal Abduction (130)         Horizontal Adduction (45)         Additional comments: No loss of PROM noted compared to evaluation      Pain Level (0-10 scale) post treatment: 9/10    ASSESSMENT/Changes in Function:   Patient fell again on 12/26/2020 and recommended to follow up with MD or go to the ER. Patient's pain continues to be high and is very guarded with L shoulder. Informed the patient again  There may be a  need for imaging due to 2 falls. Patient TTP around anterior shoulder capsule down to axillary area and reports difficulty with all shoulder movements. Patient was encouraged to go to the ER. Will reassess on next visit and see if she saw MD. No treatment was preformed and sent to ER. Patient will continue to benefit from skilled PT services to modify and progress therapeutic interventions, address functional mobility deficits, address ROM deficits, address strength deficits and analyze and address soft tissue restrictions to attain remaining goals. []  See Plan of Care  []  See progress note/recertification  []  See Discharge Summary         GOALS/Progress towards goals:    Patient/ Caregiver education and instruction: exercises  Patient Goal (s):  Regain shoulder movement and strength      Short Term Goals: To be accomplished in 3-4  treatments:  1. Patient will be independent with HEP to augment POC in 1 -2 treatments [] Met [] Not met [x] Partially met   2. Patient will demonstrate 80 deg passive forward flexion and 30 deg passive ER  [] Met [] Not met [] Partially met   3. Patient will demonstrate full elbow extension pain free [] Met [] Not met [] Partially met         Long Term Goals: To be accomplished in 18  treatments:  1. Patient will demonstrate full PROM WNL in all direction [] Met [] Not met [] Partially met   2. Patient pain will subside to </= 5/10 [] Met [] Not met [] Partially met   3. Patient will demonstrate AAFE and progress to AFE with decreased pain and less discomfort [] Met [] Not met [] Partially met   4. Pt will report ability to use the L arm in ADLs including dressing and bathing without pain or difficulty [] Met [] Not met [] Partially met   PLAN  []  Upgrade activities as tolerated     []  Continue plan of care  []  Update interventions per flow sheet       []  Discharge due to:_  []  Other:_      Hill Wheat 12/28/2020

## 2020-12-28 NOTE — ED TRIAGE NOTES
Reports had surgery in November on her lt shoulder, since then has had 3 falls on shoulder and now is complaining of pain to area. Also reports lower back pain.

## 2020-12-28 NOTE — ED PROVIDER NOTES
EMERGENCY DEPARTMENT HISTORY AND PHYSICAL EXAM        Date: 12/28/2020  Patient Name: Dinah Ayala    History of Presenting Illness     Chief Complaint   Patient presents with    Fall    Shoulder Pain    Back Pain       History Provided By: Patient    HPI: Dinah Ayala, 48 y.o. female with past medical history of diabetes, hypertension, and coronary artery disease who presents with left shoulder pain and back pain after accidental ground-level falls. She did not hit her head. States that this is happened over the last week. She did have left shoulder surgery in November completed for rotator cuff tear. She was having physical therapy and was complaining of left shoulder pain and was told she should go to the ER to make sure there is nothing wrong, falls. Pain is 10/10, constant, worse with movement, anterior shoulder and mid back. Pain is achy. PCP: JOVANI Greene    Current Outpatient Medications   Medication Sig Dispense Refill    mirabegron ER (Myrbetriq) 25 mg ER tablet Take 25 mg by mouth daily.  Januvia 100 mg tablet TK 1 T PO ONCE D UTD      Blood-Glucose Meter (Accu-Chek Lorie Plus Meter) misc TEST BLOOD GLUCOSE TWICE DAILY Dx Code:E 11.65 1 Each 0    glucose blood VI test strips (Accu-Chek Lorie Plus test strp) strip TEST BLOOD GLUCOSE TWICE DAILY Dx Code:E 11.65 100 Strip 6    lancets (Accu-Chek Softclix Lancets) misc TEST BLOOD GLUCOSE TWICE DAILY Dx Code:E 11.65 100 Each 6    oxybutynin chloride XL (DITROPAN XL) 5 mg CR tablet Take 1 Tab by mouth daily. 30 Tab 5    aspirin delayed-release 81 mg tablet Take 81 mg by mouth daily.  metFORMIN (GLUCOPHAGE) 1,000 mg tablet Take 1 Tab by mouth two (2) times daily (with meals). Stop Glipizide 60 Tab 5    atorvastatin (LIPITOR) 40 mg tablet Take 40 mg by mouth daily.  lisinopril (PRINIVIL, ZESTRIL) 10 mg tablet Take 10 mg by mouth daily.          Past History     Past Medical History:  Past Medical History:   Diagnosis Date    CAD (coronary artery disease)     Dr Yuriy Armendariz [facility])     Hypertension        Past Surgical History:  Past Surgical History:   Procedure Laterality Date    HX CORONARY STENT PLACEMENT      HX ROTATOR CUFF REPAIR Left 11/06/2020       Family History:  Family History   Problem Relation Age of Onset    Heart Disease Mother     Hypertension Father     Emphysema Father     Cancer Maternal Aunt        Social History:  Social History     Tobacco Use    Smoking status: Former Smoker    Smokeless tobacco: Never Used   Substance Use Topics    Alcohol use: Yes     Alcohol/week: 0.0 standard drinks    Drug use: Never       Allergies:  No Known Allergies    Review of Systems   Review of Systems   Constitutional: Negative for fever. HENT: Negative for congestion. Eyes: Negative for visual disturbance. Respiratory: Negative for shortness of breath. Cardiovascular: Negative for chest pain. Gastrointestinal: Negative for abdominal pain. Genitourinary: Negative for dysuria. Musculoskeletal: Negative for arthralgias. Skin: Negative for rash. Neurological: Negative for headaches. Physical Exam   General: No acute distress. Well-nourished. Skin: No rash. Head: Normocephalic. Atraumatic. Eye: No proptosis or conjunctival injections. Respiratory: No apparent respiratory distress. Gastrointestinal: Nondistended. Musculoskeletal: No obvious bony deformities. Tenderness to left shoulder. No midline lumbar spine tenderness. No swelling to the left shoulder. Psychiatric: Cooperative. Appropriate mood and affect. Diagnostic Study Results     Labs -   No results found for this or any previous visit (from the past 24 hour(s)). Radiologic Studies -   XR SHOULDER LT AP/LAT MIN 2 V   Final Result   Impression: Degenerative changes as above. No acute fracture or dislocation. XR SPINE LUMB 2 OR 3 V   Final Result   Impression: No acute fracture or dislocation.         CT Results  (Last 48 hours)    None        CXR Results  (Last 48 hours)    None          Medical Decision Making and ED Course     I reviewed the available vital signs, nursing notes, past medical history, past surgical history, family history, and social history. Vital Signs - Reviewed the patient's vital signs. Patient Vitals for the past 12 hrs:   Temp Pulse Resp BP SpO2   12/28/20 1449 99.1 °F (37.3 °C) (!) 54 18 (!) 158/83 100 %       Medical Decision Making:   Presented with left shoulder pain and back pain after falls. The differential diagnosis is fracture, contusion, dislocation. X-rays are negative. I have no concern for complication from these falls. No concern for head injury. Patient reassured and discharged with return precautions and instructions to rest.         Disposition     Discharged        Diagnosis     Clinical impression:   1. Fall, initial encounter    2. Pain in joint of left shoulder    3. Acute midline low back pain without sciatica           Attestation:  Please note that this dictation was completed with [employer], the computer voice recognition software. Quite often unanticipated grammatical, syntax, homophones, and other interpretive errors are inadvertently transcribed by the computer software. Please disregard these errors. Please excuse any errors that have escaped final proofreading. Thank you.   Stella Lanza, DO

## 2020-12-31 ENCOUNTER — HOSPITAL ENCOUNTER (OUTPATIENT)
Dept: PHYSICAL THERAPY | Age: 55
Discharge: HOME | End: 2020-12-31
Payer: MEDICARE

## 2020-12-31 PROCEDURE — 97014 ELECTRIC STIMULATION THERAPY: CPT

## 2020-12-31 PROCEDURE — 97110 THERAPEUTIC EXERCISES: CPT

## 2020-12-31 NOTE — PROGRESS NOTES
PT DAILY TREATMENT NOTE - [facility]     Patient Name: Christin Hi  Date:2020  : 1965  [x]  Patient  Verified  Payor: Araceli Friend / Plan: [employer] / Product Type: Managed Care Medicare /    Treatment Area: Pain in left shoulder [M25.512]  Encounter for other specified aftercare [Z51.89]   Next MD APPT: end of ?  In time:1:15pm Out time: 2:00pm  Total Treatment Time (min):55  Total Timed Codes (min): 40 min  1:1 Treatment Time ( [address] only): 40min  Visit #:  (Visit count could not be calculated. Make sure you are using a visit which is associated with an episode. SUBJECTIVE  Pain Level (0-10 scale) pre treatment: 10/10  Any medication changes, allergies to medications, adverse drug reactions, diagnosis change, or new procedure performed?: [x] No    [] Yes (see summary sheet for update)  Subjective functional status/changes:   [] No changes reported  Patient has not had any new falls. She said that they did x-rays at the ER and told her that she had no fractures and \"everything was OK\". She has not contacted Dr. Taran Xavier about the latest fall, and was encouraged to do this today. She says that the pain remains very high and she does not feel that it is worse because of the falls. I still encouraged her to call. She is doing \"some\" of the home exercises. Her L hip keeps giving way and causing these falls. She is using her SPC and cannot really push a rolling walker yet safely with the L UE due to the sx. However, the cane may not be enough to support her and she is at a high risk of having another fall.       38 min Therapeutic Exercise:  [x] See flow sheet :   Rationale: increase ROM, increase strength and improve coordination to improve the patients ability to perform ADL's    2 min Manual Therapy: scar mobs and soft tissue release at L sh.     Rationale: decrease pain, increase ROM and increase tissue extensibility to improve the patients ability to gain range in all shoulder planes. Modality rationale: decrease edema, decrease inflammation, decrease pain, increase tissue extensibility and increase muscle contraction/control to improve the patients ability to raise shoulder without pain   Min Type Additional Details      15 [x] Estim: []Att   [x]Unatt    []TENS instruct                  [x]IFC  []Premod   []NMES                     []Other:  []w/US   []w/ice   [x]w/heat  Position:semi-reclined  Location:left shoulder        []  Traction: [] Cervical       []Lumbar                       [] Prone          []Supine                       []Intermittent   []Continuous Lbs:  [] before manual  [] after manual  []w/heat    []  Ultrasound: []Continuous   [] Pulsed                       at: []1MHz   []3MHz Location:  W/cm2:    [] Paraffin         Location:   []w/heat    []  Ice     []  Heat  []  Ice massage Position:  Location:    []  Laser  []  Other: Position:  Location:      []  Vasopneumatic Device Pressure:       [] lo [] med [] hi   Temperature:      [x] Skin assessment post-treatment:  [x]intact []redness- no adverse reaction    []redness  adverse reaction:           With   [x] TE   [] TA   [] neuro   [x] other: Patient Education: [x] Review HEP    [] Progressed/Changed HEP based on:   [] positioning   [] body mechanics   [] transfers   [] heat/ice application    [] other: Protocol and wearing sling      Other Objective/Functional Measures: Continued passive motions--patient deferred pulleys as she thinks they caused her too much pain. Able to add wheel to tolerance today. Passive elevation to 100 degrees only and then she tenses up. Reviewed cane EROT's for home and encouraged her to do what she can and call the doctor to report the last fall. OBJECTIVE  Pain Level (0-10 scale) post treatment: 9/10    ASSESSMENT/Changes in Function:   Patient was cleared by the ER after x-rays=no fx. She will call Dr. Vicky Hooper for full update since the 2 falls though.   She needs to progress her ROM and HEP, but still c/o severe pain. Able to relax some after a couple of minutes of MT and moving slowly. Patient will continue to benefit from skilled PT services to modify and progress therapeutic interventions, address functional mobility deficits, address ROM deficits, address strength deficits and analyze and address soft tissue restrictions to attain remaining goals. GOALS/Progress towards goals:  Patient/ Caregiver education and instruction: exercises  Patient Goal (s):  Regain shoulder movement and strength      Short Term Goals: To be accomplished in 3-4  treatments:  1. Patient will be independent with HEP to augment POC in 1 -2 treatments [] Met [] Not met [x] Partially met   2. Patient will demonstrate 80 deg passive forward flexion and 30 deg passive ER  [] Met [] Not met [x] Partially met   3. Patient will demonstrate full elbow extension pain free [] Met [] Not met [x] Partially met Patient has full ext, but still has pain      Long Term Goals: To be accomplished in 18  treatments:  1. Patient will demonstrate full PROM WNL in all direction [] Met [x] Not met [] Partially met   2. Patient pain will subside to </= 5/10 [] Met [] Not met [] Partially met   3. Patient will demonstrate AAFE and progress to AFE with decreased pain and less discomfort [] Met [] Not met [] Partially met   4. Pt will report ability to use the L arm in ADLs including dressing and bathing without pain or difficulty [] Met [] Not met [] Partially met   PLAN  []  Upgrade activities as tolerated     [x]  Continue plan of care  [address] MD  []  Update interventions per flow sheet       []  Discharge due to:_  []  Other:_      Emily Matters [facility] 12/31/2020

## 2021-01-04 ENCOUNTER — HOSPITAL ENCOUNTER (OUTPATIENT)
Dept: PHYSICAL THERAPY | Age: 56
Discharge: HOME | End: 2021-01-04
Payer: MEDICARE

## 2021-01-04 PROCEDURE — 97110 THERAPEUTIC EXERCISES: CPT

## 2021-01-04 PROCEDURE — 97014 ELECTRIC STIMULATION THERAPY: CPT

## 2021-01-04 NOTE — PROGRESS NOTES
PT DAILY TREATMENT NOTE - [facility]     Patient Name: Elizabeth Fontaine  Date:2021  : 1965  [x]  Patient  Verified  Payor: Alma Todd / Plan: [employer] / Product Type: Managed Care Medicare /    Treatment Area: Pain in left shoulder [M25.512]  Encounter for other specified aftercare [Z51.89]   Next MD APPT: end of ?  In time:1:00pm Out time: 2:09pm  Total Treatment Time (min):65  Total Timed Codes (min): 40 min  1:1 Treatment Time ([facility] only): 40min  Visit #:  (Visit count could not be calculated. Make sure you are using a visit which is associated with an episode. SUBJECTIVE  Pain Level (0-10 scale) pre treatment: 5/10  Any medication changes, allergies to medications, adverse drug reactions, diagnosis change, or new procedure performed?: [x] No    [] Yes (see summary sheet for update)  Subjective functional status/changes:   [] No changes reported   Patient stated she feels a little better but it still hurts to move her arm. She stated the ER gave her pain medication but it caused UTI. She stated she does not want to do the pulleys or the bike. 40 min Therapeutic Exercise:  [x] See flow sheet :   Rationale: increase ROM, increase strength and improve coordination to improve the patients ability to perform ADL's     min Manual Therapy: scar mobs and soft tissue release at L sh.     Rationale: decrease pain, increase ROM and increase tissue extensibility to improve the patients ability to gain range in all shoulder planes.      Modality rationale: decrease edema, decrease inflammation, decrease pain, increase tissue extensibility and increase muscle contraction/control to improve the patients ability to raise shoulder without pain   Min Type Additional Details      20 [x] Estim: []Att   [x]Unatt    []TENS instruct                  [x]IFC  []Premod   []NMES                     []Other:  []w/US   []w/ice   [x]w/heat  Position:semi-reclined  Location:left shoulder        [] Traction: [] Cervical       []Lumbar                       [] Prone          []Supine                       []Intermittent   []Continuous Lbs:  [] before manual  [] after manual  []w/heat    []  Ultrasound: []Continuous   [] Pulsed                       at: []1MHz   []3MHz Location:  W/cm2:    [] Paraffin         Location:   []w/heat    []  Ice     []  Heat  []  Ice massage Position:  Location:    []  Laser  []  Other: Position:  Location:      []  Vasopneumatic Device Pressure:       [] lo [] med [] hi   Temperature:      [x] Skin assessment post-treatment:  [x]intact []redness- no adverse reaction    []redness  adverse reaction:           With   [x] TE   [] TA   [] neuro   [x] other: Patient Education: [x] Review HEP    [] Progressed/Changed HEP based on:   [] positioning   [] body mechanics   [] transfers   [] heat/ice application    [x] other: Gave HO on wand exercises     Other Objective/Functional Measures: Continued passive motions--patient deferred pulleys and bike. Added wand exercises and gave HO of exercises   OBJECTIVE  Pain Level (0-10 scale) post treatment: 5/10    ASSESSMENT/Changes in Function:   Patient is still very guarded with PROM in all planes. Discussed with patient staying in the pain free range with all exercises. Patient was given a HO of wand exercises to perform at home. She did not have any increase in pain following the exercises . She declined exercises that would increase her ROM due to increasing her pain. Patient very hesitant about performing any exercises that increases her pain. Patient will continue to benefit from skilled PT services to modify and progress therapeutic interventions, address functional mobility deficits, address ROM deficits, address strength deficits and analyze and address soft tissue restrictions to attain remaining goals.           GOALS/Progress towards goals:  Patient/ Caregiver education and instruction: exercises  Patient Goal (s):  Regain shoulder movement and strength      Short Term Goals: To be accomplished in 3-4  treatments:  1. Patient will be independent with HEP to augment POC in 1 -2 treatments [] Met [] Not met [x] Partially met   2. Patient will demonstrate 80 deg passive forward flexion and 30 deg passive ER  [] Met [] Not met [x] Partially met   3. Patient will demonstrate full elbow extension pain free [] Met [] Not met [x] Partially met Patient has full ext, but still has pain      Long Term Goals: To be accomplished in 18  treatments:  1. Patient will demonstrate full PROM WNL in all direction [] Met [x] Not met [] Partially met   2. Patient pain will subside to </= 5/10 [] Met [] Not met [] Partially met   3. Patient will demonstrate AAFE and progress to AFE with decreased pain and less discomfort [] Met [] Not met [] Partially met   4. Pt will report ability to use the L arm in ADLs including dressing and bathing without pain or difficulty [] Met [] Not met [] Partially met   PLAN  []  Upgrade activities as tolerated     [x]  Continue plan of care  [address] MD  []  Update interventions per flow sheet       []  Discharge due to:_  []  Other:_      Yasmeen Cochran 1/4/2021

## 2021-01-11 ENCOUNTER — APPOINTMENT (OUTPATIENT)
Dept: PHYSICAL THERAPY | Age: 56
End: 2021-01-11
Payer: MEDICARE

## 2021-01-13 ENCOUNTER — HOSPITAL ENCOUNTER (OUTPATIENT)
Dept: PHYSICAL THERAPY | Age: 56
Discharge: HOME | End: 2021-01-13
Payer: MEDICARE

## 2021-01-13 PROCEDURE — 97014 ELECTRIC STIMULATION THERAPY: CPT

## 2021-01-13 PROCEDURE — 97110 THERAPEUTIC EXERCISES: CPT

## 2021-01-13 NOTE — PROGRESS NOTES
PT DAILY TREATMENT NOTE - [facility]     Patient Name: Luis Long  Date:2021  : 1965  [x]  Patient  Verified  Payor: Alok Eliasling / Plan: [employer] / Product Type: Managed Care Medicare /    Treatment Area: Pain in left shoulder [M25.512]  Encounter for other specified aftercare [Z51.89]   Next MD APPT: end of ?  In time:1:45pm Out time: 2:49 pm  Total Treatment Time (min):60  Total Timed Codes (min): 40 min  1:1 Treatment Time ( [address] only): 40min  Visit #:  (Visit count could not be calculated. Make sure you are using a visit which is associated with an episode. SUBJECTIVE  Pain Level (0-10 scale) pre treatment: 5/10  Any medication changes, allergies to medications, adverse drug reactions, diagnosis change, or new procedure performed?: [x] No    [] Yes (see summary sheet for update)  Subjective functional status/changes:   [] No changes reported   Patient stated she does not want to ride the bike or perform the pulleys. She states she has not fallen. She rated her arm pain 5/10 today. 40 min Therapeutic Exercise:  [x] See flow sheet :   Rationale: increase ROM, increase strength and improve coordination to improve the patients ability to perform ADL's     min Manual Therapy: scar mobs and soft tissue release at L sh.     Rationale: decrease pain, increase ROM and increase tissue extensibility to improve the patients ability to gain range in all shoulder planes.      Modality rationale: decrease edema, decrease inflammation, decrease pain, increase tissue extensibility and increase muscle contraction/control to improve the patients ability to raise shoulder without pain   Min Type Additional Details      20 [x] Estim: []Att   [x]Unatt    []TENS instruct                  [x]IFC  []Premod   []NMES                     []Other:  []w/US   []w/ice   [x]w/heat  Position:semi-reclined  Location:left shoulder        []  Traction: [] Cervical       []Lumbar [] Prone          []Supine                       []Intermittent   []Continuous Lbs:  [] before manual  [] after manual  []w/heat    []  Ultrasound: []Continuous   [] Pulsed                       at: []1MHz   []3MHz Location:  W/cm2:    [] Paraffin         Location:   []w/heat    []  Ice     []  Heat  []  Ice massage Position:  Location:    []  Laser  []  Other: Position:  Location:      []  Vasopneumatic Device Pressure:       [] lo [] med [] hi   Temperature:      [x] Skin assessment post-treatment:  [x]intact []redness- no adverse reaction    []redness  adverse reaction:           With   [x] TE   [] TA   [] neuro   [x] other: Patient Education: [x] Review HEP    [] Progressed/Changed HEP based on:   [] positioning   [] body mechanics   [] transfers   [] heat/ice application    [x] other: Gave HO on wand exercises     Other Objective/Functional Measures: Continued passive motions--patient deferred pulleys and bike. Added wand exercises and theraband exercises   Flex 90, Abd 90 scaption 100, ER 20 IR 60  OBJECTIVE  Pain Level (0-10 scale) post treatment: 4/10    ASSESSMENT/Changes in Function:   Patient is still very guarded and resistive with PROM in all planes especially Flex and ER. Discussed with patient she needs to starting moving her arm . She declined exercises that would increase her ROM due to increasing her pain, ( pulleys and PROM). Patient  Still very limited in ROM in all planes. Patient did not have any pain with theraband exercises today. Patient will continue to benefit from skilled PT services to modify and progress therapeutic interventions, address functional mobility deficits, address ROM deficits, address strength deficits and analyze and address soft tissue restrictions to attain remaining goals. GOALS/Progress towards goals:  Patient/ Caregiver education and instruction: exercises  Patient Goal (s):  Regain shoulder movement and strength      Short Term Goals:  To be accomplished in 3-4  treatments:  1. Patient will be independent with HEP to augment POC in 1 -2 treatments [] Met [] Not met [x] Partially met   2. Patient will demonstrate 80 deg passive forward flexion and 30 deg passive ER  [] Met [] Not met [x] Partially met   3. Patient will demonstrate full elbow extension pain free [] Met [] Not met [x] Partially met Patient has full ext, but still has pain      Long Term Goals: To be accomplished in 18  treatments:  1. Patient will demonstrate full PROM WNL in all direction [] Met [x] Not met [] Partially met   2. Patient pain will subside to </= 5/10 [] Met [] Not met [] Partially met   3. Patient will demonstrate AAFE and progress to AFE with decreased pain and less discomfort [] Met [] Not met [] Partially met   4. Pt will report ability to use the L arm in ADLs including dressing and bathing without pain or difficulty [] Met [] Not met [] Partially met   PLAN  []  Upgrade activities as tolerated     [x]  Continue plan of care  [address] MD  []  Update interventions per flow sheet       []  Discharge due to:_  []  Other:_      Celestino Singh 1/13/2021

## 2021-01-18 ENCOUNTER — HOSPITAL ENCOUNTER (OUTPATIENT)
Dept: PHYSICAL THERAPY | Age: 56
Discharge: HOME | End: 2021-01-18
Payer: MEDICARE

## 2021-01-18 PROCEDURE — 97110 THERAPEUTIC EXERCISES: CPT

## 2021-01-18 PROCEDURE — 97140 MANUAL THERAPY 1/> REGIONS: CPT

## 2021-01-18 NOTE — PROGRESS NOTES
PT DAILY TREATMENT NOTE - [facility]     Patient Name: Estela Singh  Date:2021  : 1965  [x]  Patient  Verified  Payor: Edie Louis / Plan: [employer] / Product Type: Managed Care Medicare /    Treatment Area: Pain in left shoulder [M25.512]  Encounter for other specified aftercare [Z51.89]   Next MD APPT: end of ?  In time:2:00 pm Out time: 2:50 pm  Total Treatment Time (min):50  Total Timed Codes (min): 50 min  1:1 Treatment Time ( [address] only): 50 min  Visit #:  (Visit count could not be calculated. Make sure you are using a visit which is associated with an episode. SUBJECTIVE  Pain Level (0-10 scale) pre treatment: 4/10  Any medication changes, allergies to medications, adverse drug reactions, diagnosis change, or new procedure performed?: [x] No    [] Yes (see summary sheet for update)  Subjective functional status/changes:   [] No changes reported  Patient reports feeling slightly better but she had another fall this weekend, stated she didn't hurt the shoulder but it was hard to get her off the floor. Patient also stated she was trying to hold a cup of water and her arm just gave up. Patient educated that her strength is still very minimal and her she progresses in her protocol she will be gain more strength. 30 min Therapeutic Exercise:  [x] See flow sheet :   Rationale: increase ROM, increase strength and improve coordination to improve the patients ability to perform ADL's    20 min Manual Therapy: PROM in all plans as tolerated and gentle posterior capsule mob grade 1-2     Rationale: decrease pain, increase ROM and increase tissue extensibility to improve the patients ability to gain range in all shoulder planes.      Modality rationale: decrease edema, decrease inflammation, decrease pain, increase tissue extensibility and increase muscle contraction/control to improve the patients ability to raise shoulder without pain   Min Type Additional Details     [] Estim: []Att   []Unatt    []TENS instruct                  []IFC  []Premod   []NMES                     []Other:  []w/US   []w/ice   []w/heat  Position:  Location:        []  Traction: [] Cervical       []Lumbar                       [] Prone          []Supine                       []Intermittent   []Continuous Lbs:  [] before manual  [] after manual  []w/heat    []  Ultrasound: []Continuous   [] Pulsed                       at: []1MHz   []3MHz Location:  W/cm2:    [] Paraffin         Location:   []w/heat    []  Ice     []  Heat  []  Ice massage Position:  Location:    []  Laser  []  Other: Position:  Location:      []  Vasopneumatic Device Pressure:       [] lo [] med [] hi   Temperature:      [] Skin assessment post-treatment:  []intact []redness- no adverse reaction    []redness  adverse reaction:   Decline modalities         With   [x] TE   [] TA   [] neuro   [x] other: Patient Education: [x] Review HEP    [] Progressed/Changed HEP based on:   [] positioning   [] body mechanics   [] transfers   [] heat/ice application    [x] other: Gave HO on wand exercises     Other Objective/Functional Measures:   Performed UBE without resistance but she was not unable to do full cycle reports some discomfort   Hesitance noted with pulleys but performed activity without limitations. Progressed with wand, added more isometrics, posterior capsule stretch and thera band  for periscapular only. Post manual therapy   Shoulder flexion 100deg   Shoulder abd 100  with report of tightness   ER 5-10deg arm at side of body report tightness at anterior shoulder joint      OBJECTIVE  Pain Level (0-10 scale) post treatment: 4/10    ASSESSMENT/Changes in Function:   Patient is 10 weeks post op, she was educated about the importance to work on her AAROM exercises and a new HEP provided with wand and isometrics activities and add to her table slides.  Patient is presenting with increased shoulder tightness and also educated about improving range to reduce risk of having a frozen shoulder. Patient guards with PROM and requires frequent cues to relax and allow more movement, increased posterior capsule tightness noted in flexion and anterior capsule in ER. Patient also educated on post capsule stretch to incorporate with HEP. Patient agreeable to do the UBE but was not able to do a full cycle and increased shoulder hiking noted. She agreeable to do the pulley's. Patient  Overall is very limited in ROM in all planes and reports tightness with PROM. Therapist also discussed about the frequent falls and following with orthopedic regarding her hip/knees. POC: Progress with isometrics activities, UBE without resistance,  periscapular strengthening with low weights and stretches/mobilization. Patient will continue to benefit from skilled PT services to modify and progress therapeutic interventions, address functional mobility deficits, address ROM deficits, address strength deficits and analyze and address soft tissue restrictions to attain remaining goals. GOALS/Progress towards goals:  Patient/ Caregiver education and instruction: exercises  Patient Goal (s):  Regain shoulder movement and strength      Short Term Goals: To be accomplished in 3-4  treatments:  1. Patient will be independent with HEP to augment POC in 1 -2 treatments [] Met [] Not met [x] Partially met   2. Patient will demonstrate 80 deg passive forward flexion and 30 deg passive ER  [] Met [] Not met [x] Partially met   3. Patient will demonstrate full elbow extension pain free [] Met [] Not met [x] Partially met Patient has full ext, but still has pain      Long Term Goals: To be accomplished in 18  treatments:  1. Patient will demonstrate full PROM WNL in all direction [] Met [x] Not met [] Partially met   2. Patient pain will subside to </= 5/10 [] Met [] Not met [x] Partially met   3.  Patient will demonstrate AAFE and progress to AFE with decreased pain and less discomfort [] Met [] Not met [] Partially met   4. Pt will report ability to use the L arm in ADLs including dressing and bathing without pain or difficulty [] Met [] Not met [] Partially met   PLAN  []  Upgrade activities as tolerated     [x]  Continue plan of care  [address] MD  []  Update interventions per flow sheet       []  Discharge due to:_  []  Other:_      Candy Corral, PT 1/18/2021

## 2021-01-20 ENCOUNTER — HOSPITAL ENCOUNTER (OUTPATIENT)
Dept: PHYSICAL THERAPY | Age: 56
Discharge: HOME | End: 2021-01-20
Payer: MEDICARE

## 2021-01-20 PROCEDURE — 97140 MANUAL THERAPY 1/> REGIONS: CPT

## 2021-01-20 PROCEDURE — 97110 THERAPEUTIC EXERCISES: CPT

## 2021-01-20 PROCEDURE — 97014 ELECTRIC STIMULATION THERAPY: CPT

## 2021-01-20 NOTE — PROGRESS NOTES
PT DAILY TREATMENT NOTE - [facility]     Patient Name: Leanne Lama  Date:2021  : 1965  [x]  Patient  Verified  Payor: Filiberto Dunaway / [facility] / Product Type: Managed Care Medicare /    Treatment Area: Pain in left shoulder [M25.512]  Encounter for other specified aftercare [Z51.89]   Next MD APPT: end of ?  In time:1:55 pm Out time: 2:59 pm  Total Treatment Time (min):60  Total Timed Codes (min): 45 min  1:1 Treatment Time ( [address] only): 45 min  Visit #:  (Visit count could not be calculated. Make sure you are using a visit which is associated with an episode. SUBJECTIVE  Pain Level (0-10 scale) pre treatment: 4/10  Any medication changes, allergies to medications, adverse drug reactions, diagnosis change, or new procedure performed?: [x] No    [] Yes (see summary sheet for update)  Subjective functional status/changes:   [] No changes reported  Patient reports 50% improvement since receiving therapy. She reports when she has pain it can get to a 10/10, the best has been 4/10. She stated prior to surgery on her shoulder she could straighten her elbow. 25 min Therapeutic Exercise:  [x] See flow sheet :   Rationale: increase ROM, increase strength and improve coordination to improve the patients ability to perform ADL's    20 min Manual Therapy: PROM in all plans as tolerated and gentle posterior capsule mob grade 1-2     Rationale: decrease pain, increase ROM and increase tissue extensibility to improve the patients ability to gain range in all shoulder planes.      Modality rationale: decrease edema, decrease inflammation, decrease pain, increase tissue extensibility and increase muscle contraction/control to improve the patients ability to raise shoulder without pain   Min Type Additional Details    15 [x] Estim: []Att   [x]Unatt    []TENS instruct                  [x]IFC  []Premod   []NMES                     []Other:  []w/US   []w/ice [x]w/heat  Position:Sitting with arm elevated 90 abd and 45 deg ER on wedge  Location: L shoulder       []  Traction: [] Cervical       []Lumbar                       [] Prone          []Supine                       []Intermittent   []Continuous Lbs:  [] before manual  [] after manual  []w/heat    []  Ultrasound: []Continuous   [] Pulsed                       at: []1MHz   []3MHz Location:  W/cm2:    [] Paraffin         Location:   []w/heat    []  Ice     []  Heat  []  Ice massage Position:  Location:    []  Laser  []  Other: Position:  Location:      []  Vasopneumatic Device Pressure:       [] lo [] med [] hi   Temperature:      [x] Skin assessment post-treatment:  []intact []redness- no adverse reaction    []redness  adverse reaction:   Decline modalities         With   [x] TE   [] TA   [] neuro   [x] other: Patient Education: [x] Review HEP    [] Progressed/Changed HEP based on:   [] positioning   [] body mechanics   [] transfers   [] heat/ice application    [x] other: Gave HO on wand exercises     Other Objective/Functional Measures:     OBJECTIVE  Pain Level (0-10 scale) post treatment: 4/10   strength R 35 lbs, L 20 lbs    SHOULDER                                         AROM   PROM            MMT   R L R L R L   Flexion (180) 130 90  100     Extension (60)         Abduction (180) 125 90  110     Adduction (0)         IR (70) L3   70     ER (90) C7   30P! Horizontal Abduction (130)         Horizontal Adduction (45)         Additional comments: Patient is very guarded with PROM    ASSESSMENT/Changes in Function:    Patient is presenting with increased shoulder tightness and also educated about improving range to reduce risk of having a frozen shoulder. Patient guards with PROM and requires frequent cues to relax and allow more movement, increased posterior capsule tightness noted in flexion and anterior capsule in ER. Overall is very limited in ROM in all planes and reports tightness with PROM. Therapist also discussed about the frequent falls and following with orthopedic regarding her hip/knees.  Patient will continue to benefit from skilled PT services to modify and progress therapeutic interventions, address functional mobility deficits, address ROM deficits, address strength deficits and analyze and address soft tissue restrictions to attain remaining goals.          GOALS/Progress towards goals:  Patient/ Caregiver education and instruction: exercises  Patient Goal (s): “ Regain shoulder movement and strength ”     Short Term Goals: To be accomplished in 3-4  treatments:  1. Patient will be independent with HEP to augment POC in 1 -2 treatments [] Met [] Not met [x] Partially met   2. Patient will demonstrate 80 deg passive forward flexion and 30 deg passive ER  [] Met [] Not met [x] Partially met Patient has Flexion ROM but not ER  3. Patient will demonstrate full elbow extension pain free [] Met [] Not met [x] Partially met Patient has full ext, but still has pain      Long Term Goals: To be accomplished in 18  treatments:  1. Patient will demonstrate full PROM WNL in all direction [] Met [x] Not met [] Partially met   2. Patient pain will subside to </= 5/10 [] Met [] Not met [x] Partially met Most of the time pain is 4/10 but not always.  3. Patient will demonstrate AAFE and progress to AFE with decreased pain and less discomfort [] Met [x] Not met [] Partially met   4.Pt will report ability to use the L arm in ADLs including dressing and bathing without pain or difficulty [] Met [] Not met [x] Partially met   Patient can use her L arm in ADLs but still has difficulty.  PLAN  []  Upgrade activities as tolerated     [x]  Continue plan of care  WORK ON INCREASING ROM AND MAKE SURE SHE CALLED THE MD  []  Update interventions per flow sheet       []  Discharge due to:_  []  Other:_      Kenzie Dupree 1/20/2021

## 2021-01-20 NOTE — PROGRESS NOTES
[address] [address] [address] [facility] [address] [facility], [address] [place] [address]  Ph: [phone]    Fax: [phone]  Therapy Progress Report  Name: Rachna Dhaliwal   : 1965   MD: JOVANI Henson     Treatment Diagnosis: Pain in left shoulder [M25.512]  Encounter for other specified aftercare [Z51.89]  Start of Care: 20 Visits from Start of Care: 9  Missed Visits: 2  Summary of Care/Goals:Patient reports 50% improvement since receiving therapy. She reports when she has pain it can get to a 10/10, the best has been 4/10. She stated prior to surgery on her shoulder she could straighten her elbow.      Other Objective/Functional Measures:      OBJECTIVE  Pain Level (0-10 scale) post treatment: 4/10   strength R 35 lbs, L 20 lbs     SHOULDER                                         AROM                        PROM                       MMT              R L R L R L   Flexion (180) 130 90   100       Extension (60)               Abduction (180) 125 90   110       Adduction (0)               IR (70) L3     70       ER (90) C7     30P!       Horizontal Abduction (130)               Horizontal Adduction (45)               Additional comments: Patient is very guarded with PROM     ASSESSMENT/Changes in Function:    Patient is presenting with increased shoulder tightness and also educated about improving range to reduce risk of having a frozen shoulder. Patient guards with PROM and requires frequent cues to relax and allow more movement, increased posterior capsule tightness noted in flexion and anterior capsule in ER. Overall is very limited in ROM in all planes and reports tightness with PROM. Therapist also discussed about the frequent falls and following up with orthopedic regarding her hip/knees.   Patient will continue to benefit from skilled PT services to modify and progress therapeutic interventions, address functional mobility deficits, address ROM deficits, address strength deficits and analyze and address soft tissue restrictions to attain remaining goals.           GOALS/Progress towards goals:  Patient/ Caregiver education and instruction: exercises  Patient Goal (s):  Regain shoulder movement and strength      Short Term Goals: To be accomplished in 3-4  treatments:  1. Patient will be independent with HEP to augment POC in 1 -2 treatments []? Met []? Not met [x]? Partially met   2. Patient will demonstrate 80 deg passive forward flexion and 30 deg passive ER  []? Met []? Not met [x]? Partially met Patient has Flexion ROM but not ER  3. Patient will demonstrate full elbow extension pain free []? Met []? Not met [x]? Partially met Patient has full ext, but still has pain      Long Term Goals: To be accomplished in 18  treatments:  1. Patient will demonstrate full PROM WNL in all direction []? Met [x]? Not met []? Partially met   2. Patient pain will subside to </= 5/10 []? Met []? Not met [x]? Partially met Most of the time pain is 4/10 but not always. 3. Patient will demonstrate AAFE and progress to AFE with decreased pain and less discomfort []? Met [x]? Not met []? Partially met   4. Pt will report ability to use the L arm in ADLs including dressing and bathing without pain or difficulty []? Met []? Not met [x]? Partially met   Patient can use her L arm in ADLs but still has difficulty     Frequency/Duration Patient to be seen 2 times per week for 2-51 weeks  Certification Period 11/25/2020 to 2/25/2021    Eva Harper 1/20/2021     Recommendations: to continue in therapy to progress with POC s/p (L) RCT - patient is 10.5 weeks post op    [x]  Plan of care has been reviewed with PTA. Sandy Li, PT 1/20/2021         ________________________________________________________________________     Please retain this original for your records.

## 2021-02-03 ENCOUNTER — APPOINTMENT (OUTPATIENT)
Dept: PHYSICAL THERAPY | Age: 56
End: 2021-02-03
Payer: MEDICARE

## 2021-02-12 ENCOUNTER — HOSPITAL ENCOUNTER (OUTPATIENT)
Dept: PHYSICAL THERAPY | Age: 56
Discharge: HOME | End: 2021-02-12
Payer: MEDICARE

## 2021-02-12 PROCEDURE — 97110 THERAPEUTIC EXERCISES: CPT

## 2021-02-12 PROCEDURE — 97140 MANUAL THERAPY 1/> REGIONS: CPT

## 2021-02-12 NOTE — PROGRESS NOTES
PT DAILY TREATMENT NOTE - MCR     Patient Name: Cheryle Mcdonnell  Date:2021  : 1965  [x]  Patient  Verified  Payor: Earnest Hargrove / Plan: [email or web address] / Product Type: Managed Care Medicare /    Treatment Area: Pain in left shoulder [M25.512]  Encounter for other specified aftercare [Z51.89]   Next MD APPT: end of ?  In time: 2:00pm Out time: 2:40pm  Total Treatment Time (min):40  Total Timed Codes (min): 40 min  1:1 Treatment Time ( [address] only):40  min  Visit #:  (Visit count could not be calculated. Make sure you are using a visit which is associated with an episode. SUBJECTIVE  Pain Level (0-10 scale) pre treatment: 5/10  Any medication changes, allergies to medications, adverse drug reactions, diagnosis change, or new procedure performed?: [x] No    [] Yes (see summary sheet for update)  Subjective functional status/changes:   [] No changes reported  Patient stated she is feeling ok, pain about 5/10. Reports to be c/o with HEP. Reports no recent falls. Patient is 14 weeks post op. Patient came 15 min late for a 30min slot session    25 min Therapeutic Exercise:  [x] See flow sheet :   Rationale: increase ROM, increase strength and improve coordination to improve the patients ability to perform ADL's    15 min Manual Therapy: PROM in all plans as tolerated with posterior capsule mobs and STM to anterior deltoid/bicep     Rationale: decrease pain, increase ROM and increase tissue extensibility to improve the patients ability to gain range in all shoulder planes.      Modality rationale: decrease edema, decrease inflammation, decrease pain, increase tissue extensibility and increase muscle contraction/control to improve the patients ability to raise shoulder without pain   Min Type Additional Details     [] Estim: []Att   []Unatt    []TENS instruct                  []IFC  []Premod   []NMES                     []Other:  []w/US   []w/ice   []w/heat  Position:   Location: []  Traction: [] Cervical       []Lumbar                       [] Prone          []Supine                       []Intermittent   []Continuous Lbs:  [] before manual  [] after manual  []w/heat    []  Ultrasound: []Continuous   [] Pulsed                       at: []1MHz   []3MHz Location:  W/cm2:    [] Paraffin         Location:   []w/heat    []  Ice     []  Heat  []  Ice massage Position:  Location:    []  Laser  []  Other: Position:  Location:      []  Vasopneumatic Device Pressure:       [] lo [] med [] hi   Temperature:      [] Skin assessment post-treatment:  []intact []redness- no adverse reaction    []redness  adverse reaction:   Decline modalities         With   [x] TE   [] TA   [] neuro   [x] other: Patient Education: [x] Review HEP    [] Progressed/Changed HEP based on:   [] positioning   [] body mechanics   [] transfers   [] heat/ice application    [x] other: Gave HO on wand exercises     Other Objective/Functional Measures:   First 15 minutes spent with Varghese Li, primary therapist, and then additional time spent with Jason Dallas, secondary therapist, to provide pt with full session due to tardiness. ASSESSMENT/Changes in Function:   Pt with pain prior to end range flexion/abduction. Firm end feels with passive IR/ER. Improvement in IR ROM with posterior GH glides. No limitations noted during exercises, patient continues to present with limited shoulder ROM in flexion/abduction, encouraged to continue to work on exercises at home. Patient continue session with another therapist who performed PROM and MT techniques. Patient will continue to benefit from skilled PT services to modify and progress therapeutic interventions, address functional mobility deficits, address ROM deficits, address strength deficits and analyze and address soft tissue restrictions to attain remaining goals.           GOALS/Progress towards goals:  Patient/ Caregiver education and instruction: exercises  Patient Goal (s):  Regain shoulder movement and strength      Short Term Goals: To be accomplished in 3-4  treatments:  1. Patient will be independent with HEP to augment POC in 1 -2 treatments [] Met [] Not met [x] Partially met   2. Patient will demonstrate 80 deg passive forward flexion and 30 deg passive ER  [] Met [] Not met [x] Partially met Patient has Flexion ROM but not ER  3. Patient will demonstrate full elbow extension pain free [] Met [] Not met [x] Partially met Patient has full ext, but still has pain      Long Term Goals: To be accomplished in 18  treatments:  1. Patient will demonstrate full PROM WNL in all direction [] Met [x] Not met [] Partially met   2. Patient pain will subside to </= 5/10 [] Met [] Not met [x] Partially met Most of the time pain is 4/10 but not always. 3. Patient will demonstrate AAFE and progress to AFE with decreased pain and less discomfort [] Met [x] Not met [] Partially met   4. Pt will report ability to use the L arm in ADLs including dressing and bathing without pain or difficulty [] Met [] Not met [x] Partially met   Patient can use her L arm in ADLs but still has difficulty.   PLAN  []  Upgrade activities as tolerated     [x]  Continue plan of care  WORK ON INCREASING ROM AND MAKE SURE Rylee Cain MD  []  Update interventions per flow sheet       []  Discharge due to:_  []  Other:_      Adan Haile, PT 2/12/2021

## 2021-02-15 ENCOUNTER — HOSPITAL ENCOUNTER (OUTPATIENT)
Dept: PHYSICAL THERAPY | Age: 56
Discharge: HOME | End: 2021-02-15
Payer: MEDICARE

## 2021-02-15 PROCEDURE — 97110 THERAPEUTIC EXERCISES: CPT

## 2021-02-15 NOTE — PROGRESS NOTES
PT DAILY TREATMENT NOTE - MCR     Patient Name: Renetta Mas  Date:2/15/2021  : 1965  [x]  Patient  Verified  Payor: Jessica Barriga / Plan: [employer] / Product Type: Managed Care Medicare /    Treatment Area: Pain in left shoulder [M25.512]  Encounter for other specified aftercare [Z51.89]   Next MD APPT: end of ?  In time: 1:50 pm Out time: 2:40 pm  Total Treatment Time (min):50  Total Timed Codes (min): 50 min  1:1 Treatment Time ( [address] only):50 min  Visit #:  (Visit count could not be calculated. Make sure you are using a visit which is associated with an episode. SUBJECTIVE  Pain Level (0-10 scale) pre treatment: 5/10  Any medication changes, allergies to medications, adverse drug reactions, diagnosis change, or new procedure performed?: [x] No    [] Yes (see summary sheet for update)  Subjective functional status/changes:   [] No changes reported  Patient stated she is trying to move her arm more at home and do the exercises. She is complaining of pain to anterior aspect of L shoulder. 50  min Therapeutic Exercise:  [x] See flow sheet : PROM   Rationale: increase ROM, increase strength and improve coordination to improve the patients ability to perform ADL's     min Manual Therapy: PROM in all plans as tolerated and gentle posterior capsule mob grade 1-2     Rationale: decrease pain, increase ROM and increase tissue extensibility to improve the patients ability to gain range in all shoulder planes. Decline modalities         With   [x] TE   [] TA   [] neuro   [x] other: Patient Education: [x] Review HEP    [] Progressed/Changed HEP based on:   [] positioning   [] body mechanics   [] transfers   [] heat/ice application    [x] other: Gave HO on wand exercises     Other Objective/Functional Measures:   Continue with POC      ASSESSMENT/Changes in Function:   Patient had some discomfort with exercises but performed them with rest breaks. Patient has increase pain to anterior aspect of L shoulder with PROM in abduction. Patient is still guarded  With PROM. She also has difficulty getting up from a chair due to knees , performed most of the exercises in sitting and supine. Patient will continue to benefit from skilled PT services to modify and progress therapeutic interventions, address functional mobility deficits, address ROM deficits, address strength deficits and analyze and address soft tissue restrictions to attain remaining goals. GOALS/Progress towards goals:  Patient/ Caregiver education and instruction: exercises  Patient Goal (s):  Regain shoulder movement and strength      Short Term Goals: To be accomplished in 3-4  treatments:  1. Patient will be independent with HEP to augment POC in 1 -2 treatments [] Met [] Not met [x] Partially met   2. Patient will demonstrate 80 deg passive forward flexion and 30 deg passive ER  [] Met [] Not met [x] Partially met Patient has Flexion ROM but not ER  3. Patient will demonstrate full elbow extension pain free [] Met [] Not met [x] Partially met Patient has full ext, but still has pain      Long Term Goals: To be accomplished in 18  treatments:  1. Patient will demonstrate full PROM WNL in all direction [] Met [x] Not met [] Partially met   2. Patient pain will subside to </= 5/10 [] Met [] Not met [x] Partially met Most of the time pain is 4/10 but not always. 3. Patient will demonstrate AAFE and progress to AFE with decreased pain and less discomfort [] Met [x] Not met [] Partially met   4. Pt will report ability to use the L arm in ADLs including dressing and bathing without pain or difficulty [] Met [] Not met [x] Partially met   Patient can use her L arm in ADLs but still has difficulty.   PLAN  []  Upgrade activities as tolerated     [x]  Continue plan of care  WORK ON INCREASING ROM AND MAKE SURE Alexia Maldonado MD  []  Update interventions per flow sheet       []  Discharge due to:_  []  Other:_      MPTLLLG Leia 2/15/2021

## 2021-02-17 ENCOUNTER — APPOINTMENT (OUTPATIENT)
Dept: PHYSICAL THERAPY | Age: 56
End: 2021-02-17
Payer: MEDICARE

## 2021-02-22 ENCOUNTER — HOSPITAL ENCOUNTER (OUTPATIENT)
Dept: PHYSICAL THERAPY | Age: 56
Discharge: HOME | End: 2021-02-22
Payer: MEDICARE

## 2021-02-22 PROCEDURE — 97110 THERAPEUTIC EXERCISES: CPT

## 2021-02-22 NOTE — PROGRESS NOTES
PT DAILY TREATMENT NOTE - MCR     Patient Name: Manuel Giraldo  Date:2021  : 1965  [x]  Patient  Verified  Payor: Mariia Garrison / Plan: [employer] / Product Type: Managed Care Medicare /    Treatment Area: Pain in left shoulder [M25.512]  Encounter for other specified aftercare [Z51.89]   Next MD APPT: end of ?  In time: 2:00 pm Out time: 2:50pm  Total Treatment Time (min):50  Total Timed Codes (min): 50 min  1:1 Treatment Time ( [address] only):50 min  Visit #:  (Visit count could not be calculated. Make sure you are using a visit which is associated with an episode. SUBJECTIVE  Pain Level (0-10 scale) pre treatment: 5/10  Any medication changes, allergies to medications, adverse drug reactions, diagnosis change, or new procedure performed?: [x] No    [] Yes (see summary sheet for update)  Subjective functional status/changes:   [] No changes reported  Reports 5/10 left shoulder pain, stated is doing ok but still c/o pain at the anterior aspect of L shoulder  Date of surgery 2020    50  min Therapeutic Exercise:  [x] See flow sheet : PROM   Rationale: increase ROM, increase strength and improve coordination to improve the patients ability to perform ADL's     min Manual Therapy: PROM in all plans as tolerated and gentle posterior capsule mob grade 1-2     Rationale: decrease pain, increase ROM and increase tissue extensibility to improve the patients ability to gain range in all shoulder planes.        Decline modalities         With   [x] TE   [] TA   [] neuro   [x] other: Patient Education: [x] Review HEP    [] Progressed/Changed HEP based on:   [] positioning   [] body mechanics   [] transfers   [] heat/ice application    [x] other: Gave HO on wand exercises     Other Objective/Functional Measures:   Continue with POC  Active ROM  Shoulder flexion 150deg ->PROM 155deg  Shoulder abd 100deg -> PROM 105deg   Shoulder ER active 30 deg ->PROM 35 deg   Shoulder IR active 70deg       ASSESSMENT/Changes in Function:   Patient tolerated activities fairly well, she is 15 weeks post op, presents with some posterior joint capsule tightness in ABD>ER, patient encouraged to continue stretches at home with wall/table slides. During PROM, cues to relax provided to promote more shoulder movement Patient still guarded  With PROM. She also has difficulty getting up from a chair due to knee pain , performed most of the exercises in sitting and supine. Patient will continue to benefit from skilled PT services to modify and progress therapeutic interventions, address functional mobility deficits, address ROM deficits, address strength deficits and analyze and address soft tissue restrictions to attain remaining goals. GOALS/Progress towards goals:  Patient/ Caregiver education and instruction: exercises  Patient Goal (s):  Regain shoulder movement and strength      Short Term Goals: To be accomplished in 3-4  treatments:  1. Patient will be independent with HEP to augment POC in 1 -2 treatments [] Met [] Not met [x] Partially met   2. Patient will demonstrate 80 deg passive forward flexion and 30 deg passive ER  [] Met [] Not met [x] Partially met Patient has Flexion ROM but not ER  3. Patient will demonstrate full elbow extension pain free [] Met [] Not met [x] Partially met Patient has full ext, but still has pain      Long Term Goals: To be accomplished in 18  treatments:  1. Patient will demonstrate full PROM WNL in all direction [] Met [x] Not met [] Partially met   2. Patient pain will subside to </= 5/10 [] Met [] Not met [x] Partially met Most of the time pain is 4/10 but not always. 3. Patient will demonstrate AAFE and progress to AFE with decreased pain and less discomfort [] Met [x] Not met [] Partially met   4. Pt will report ability to use the L arm in ADLs including dressing and bathing without pain or difficulty [] Met [] Not met [x] Partially met   Patient can use her L arm in ADLs but still has difficulty.   PLAN  []  Upgrade activities as tolerated     [x]  Continue plan of care  WORK ON INCREASING ROM AND MAKE SURE Ether Eusebio TORRES  []  Update interventions per flow sheet       []  Discharge due to:_  []  Other:_      Armando Ruby DPT 2/22/2021

## 2021-02-25 ENCOUNTER — HOSPITAL ENCOUNTER (OUTPATIENT)
Dept: PHYSICAL THERAPY | Age: 56
Discharge: HOME | End: 2021-02-25
Payer: MEDICARE

## 2021-02-25 PROCEDURE — 97140 MANUAL THERAPY 1/> REGIONS: CPT

## 2021-02-25 PROCEDURE — 97110 THERAPEUTIC EXERCISES: CPT

## 2021-02-25 NOTE — PROGRESS NOTES
[address] [address] [address] Suite [facility], [facility]  Ph: [phone]    Fax: [phone]  Plan of Care  Name: Joe Kinght  : 1965   MD: JOVANI Auguste     Medical/Treatment Diagnosis: Pain in left shoulder [M25.512]  Encounter for other specified aftercare [Z51.89]     Problem List/Impairments: pain affecting function, decrease ROM, decrease strength, decrease ADL/ functional abilitiies and decrease flexibility/ joint mobility    Start of Care: 20 Visits from Start of Care: 13 Missed Visits: 5  Certification Period: 21 to 21    Frequency/Duration: 2 times a week for 20 treatments   Treatment Plan may include any combination of the following: Therapeutic exercise, Neuromuscular re-education, Physical agent/modality, Manual therapy and Patient education  [x]  Plan of care has been reviewed with PTA. Patient/ Caregiver education and instruction: self care     Summary of Care/Goals:  Patient is 15 weeks post po S/P RCT with SAD on 2020  Patient progress has been slow, due to patient high pain levels, increased guarding with PROM and we are not sure how compliant patient was with HEP. Patient presents with decreased A/PROM in all directions, we have been following the protocol however she is not quite making her ROM target goals. She presents with increased posterior capulse tigthness and during therapy and does not tolerate passive stretches or PROM very well. However, patient overall stated she is able to cook, clean and drive now. She does have difficulty reaching behind with bathing or doing her hair. She is able to reach the first shelf into her cabinet, but not the 2nd or third. Her pain at best 5/10 and worse 9/10. She reports no recent falls. She is sleeping better but still can not sleep on her L side.   Patient will continue to benefit from skilled PT services to progress with therapeutic interventions, address functional mobility deficits, address ROM deficits, address strength deficits and analyze and address soft tissue restrictions to attain remaining goals.           Other Objective/Functional Measures: Active ROM  Shoulder flexion 150deg ->PROM 155deg  Shoulder abd 100deg -> PROM 105deg   Shoulder ER active 30 deg ->PROM 35 deg   Shoulder IR active 70deg      SPADI : Shoulder Pain and Disability index    Score Pain 64% and Disability 43 %    GOALS/Progress towards goals:  Patient/ Caregiver education and instruction: exercises  Patient Goal (s):  Regain shoulder movement and strength      Short Term Goals: To be accomplished in 3-4  treatments:  1. Patient will be independent with HEP to augment POC in 1 -2 treatments [x]? Met []? Not met []? Partially met   2. Patient will demonstrate 80 deg passive forward flexion and 30 deg passive ER  []? Met []? Not met [x]? Partially met Patient has Flexion ROM but not ER  3. Patient will demonstrate full elbow extension pain free []? Met []? Not met [x]? Partially met Patient has full ext, but still has pain      Long Term Goals: To be accomplished in 18  treatments:  1. Patient will demonstrate full PROM WNL in all direction []? Met [x]? Not met []? Partially met   2. Patient pain will subside to </= 5/10 []? Met []? Not met [x]? Partially met Most of the time pain is 4/10 but not always. 3. Patient will demonstrate AAFE and progress to AFE with decreased pain and less discomfort []? Met [x]? Not met []? Partially met   4. Pt will report ability to use the L arm in ADLs including dressing and bathing without pain or difficulty []? Met []? Not met [x]? Partially met   Patient can use her L arm in ADLs but still has difficulty. Recommendations:     Ladi Garay PTA 2/25/2021     Retain this original for your records. If you are unable to process this request in 24 hours, please contact our office. ________________________________________________________________________  NOTE TO PHYSICIAN:  Please complete the following and fax to: [address]:  Fax: [phone]   ____ I have read the above report and request that my patient continue therapy. ____ I have read the above report and request that my patient continue therapy with the following changes/special instructions:    ____ I have read the above report and request that my patient be discharged from therapy.     Physician's Signature:_______________________________________________ Date:_____________Time:____________      JOVANI Sandra

## 2021-02-25 NOTE — PROGRESS NOTES
PT DAILY TREATMENT NOTE - MCR     Patient Name: Joe Knight  Date:2021  : 1965  [x]  Patient  Verified  Payor: Jayleen Pi / Plan: [employer] / Product Type: Managed Care Medicare /    Treatment Area: Pain in left shoulder [M25.512]  Encounter for other specified aftercare [Z51.89]   Next MD APPT: end of ?  In time: 1:55 pm Out time: 2:40pm  Total Treatment Time (min):45  Total Timed Codes (min): 45 min  1:1 Treatment Time ( [address] only):45 min  Visit #:  (Visit count could not be calculated. Make sure you are using a visit which is associated with an episode. SUBJECTIVE  Pain Level (0-10 scale) pre treatment: 9/10  Any medication changes, allergies to medications, adverse drug reactions, diagnosis change, or new procedure performed?: [x] No    [] Yes (see summary sheet for update)  Subjective functional status/changes:   [] No changes reported    Patient stated she is able to cook, clean and drive now. She does have difficulty reaching and with bathing. She is able to reach the first shelf into her cabinet. Her pain at best 5/10 and worse 9/10. She reports no recent falls. She is sleeping better but still can not sleep on her L side. 35  min Therapeutic Exercise:  [x] See flow sheet : PROM   Rationale: increase ROM, increase strength and improve coordination to improve the patients ability to perform ADL's    10 min Manual Therapy: PROM in all plans as tolerated and gentle posterior capsule mob grade 1-2 , Myofascial release to pec/axillary    Rationale: decrease pain, increase ROM and increase tissue extensibility to improve the patients ability to gain range in all shoulder planes.            With   [x] TE   [] TA   [] neuro   [x] other: Patient Education: [x] Review HEP    [] Progressed/Changed HEP based on:   [] positioning   [] body mechanics   [] transfers   [] heat/ice application    [x] other: Gave HO on wand exercises     Other Objective/Functional Measures: Active ROM  Shoulder flexion 150deg ->PROM 155deg  Shoulder abd 100deg -> PROM 105deg   Shoulder ER active 30 deg ->PROM 35 deg   Shoulder IR active 70deg     SPADI : Shoulder Pain and Disability index    Score Pain 64% and Disability 43 %    ASSESSMENT/Changes in Function:   Patient tolerated activities fairly well, she is 15 weeks post op, presents with some posterior joint capsule tightness in ABD>ER. Patient still guarded with PROM. She also has difficulty getting up from a chair due to knee pain. Patient also has other health issues which causes her to miss some appointments. Also inclement weather has  Patient is making progress towards goals . Patient will continue to benefit from skilled PT services to modify and progress therapeutic interventions, address functional mobility deficits, address ROM deficits, address strength deficits and analyze and address soft tissue restrictions to attain remaining goals. GOALS/Progress towards goals:  Patient/ Caregiver education and instruction: exercises  Patient Goal (s):  Regain shoulder movement and strength      Short Term Goals: To be accomplished in 3-4  treatments:  1. Patient will be independent with HEP to augment POC in 1 -2 treatments [x] Met [] Not met [] Partially met   2. Patient will demonstrate 80 deg passive forward flexion and 30 deg passive ER  [] Met [] Not met [x] Partially met Patient has Flexion ROM but not ER  3. Patient will demonstrate full elbow extension pain free [] Met [] Not met [x] Partially met Patient has full ext, but still has pain      Long Term Goals: To be accomplished in 18  treatments:  1. Patient will demonstrate full PROM WNL in all direction [] Met [x] Not met [] Partially met   2. Patient pain will subside to </= 5/10 [] Met [] Not met [x] Partially met Most of the time pain is 4/10 but not always.   3. Patient will demonstrate AAFE and progress to AFE with decreased pain and less discomfort [] Met [x] Not met [] Partially met   4. Pt will report ability to use the L arm in ADLs including dressing and bathing without pain or difficulty [] Met [] Not met [x] Partially met   Patient can use her L arm in ADLs but still has difficulty.   PLAN  []  Upgrade activities as tolerated     []  Continue plan of care    []  Update interventions per flow sheet       []  Discharge due to:_  [x]  Other:_New POC    Kenzie Dupree, PTA 2/25/2021

## 2021-03-25 ENCOUNTER — HOSPITAL ENCOUNTER (OUTPATIENT)
Dept: PHYSICAL THERAPY | Age: 56
Discharge: HOME | End: 2021-03-25
Payer: MEDICAID

## 2021-03-25 PROCEDURE — 97140 MANUAL THERAPY 1/> REGIONS: CPT

## 2021-03-25 PROCEDURE — 97110 THERAPEUTIC EXERCISES: CPT

## 2021-03-25 NOTE — PROGRESS NOTES
PT DAILY TREATMENT NOTE - MCR     Patient Name: Khloe Andino  Date:3/25/2021  : 1965  [x]  Patient  Verified  Payor: Sarah Hernández / Plan: [employer] / Product Type: Managed Care Medicare /    Treatment Area: Pain in left shoulder [M25.512]  Encounter for other specified aftercare [Z51.89]   Next MD APPT: end of ?  In time: 11:30 pm Out time: 12:10 pm  Total Treatment Time (min):40  Total Timed Codes (min): 40 min  1:1 Treatment Time ( [address] only):40 min  Visit #:  (Visit count could not be calculated. Make sure you are using a visit which is associated with an episode. SUBJECTIVE  Pain Level (0-10 scale) pre treatment: 6/10  Any medication changes, allergies to medications, adverse drug reactions, diagnosis change, or new procedure performed?: [x] No    [] Yes (see summary sheet for update)  Subjective functional status/changes:   [x] No changes reported  Patient awaiting insurance authorization, reports no change since last visit, reports some difficulty with reaching and bathing but not as much. She reports no recent falls, since last visit . Stated she is able to sleep on the left side a little better. Surgery 2020    30  min Therapeutic Exercise:  [x] See flow sheet : PROM   Rationale: increase ROM, increase strength and improve coordination to improve the patients ability to perform ADL's    10 min Manual Therapy: PROM in all plans as tolerated and gentle posterior capsule mob grade 1-2 , Myofascial release to pec/axillary    Rationale: decrease pain, increase ROM and increase tissue extensibility to improve the patients ability to gain range in all shoulder planes.            With   [x] TE   [] TA   [] neuro   [x] other: Patient Education: [x] Review HEP    [] Progressed/Changed HEP based on:   [] positioning   [] body mechanics   [] transfers   [] heat/ice application    [x] other: Gave HO on wand exercises     Other Objective/Functional Measures: Progressed with RC strengthening activities. Active ROM->PROM  Shoulder flexion 155deg ->PROM 160deg  Shoulder abd 100deg -> PROM 115 deg   Shoulder ER active 30-> PROM   40deg  Shoulder IR active 70deg       ASSESSMENT/Changes in Function:   Patient tolerated activities well able to do all exercises, no report of pain but soreness reported. Patient is 19 weeks post op and she presents with some posterior joint capsule tightness in ABD>ER. Patient still guarded with PROM and range did not change much since last session. She also continues to have difficulty getting up from a chair due to knee pain. Patient encouraged to ice/heat at home since she decline doing so during session. As well as  encouraged to continue with HEP. Patient will continue to benefit from skilled PT services to modify and progress therapeutic interventions, address functional mobility deficits, address ROM deficits, address strength deficits and analyze and address soft tissue restrictions to attain remaining goals. GOALS/Progress towards goals:  Patient/ Caregiver education and instruction: exercises  Patient Goal (s):  Regain shoulder movement and strength      Short Term Goals: To be accomplished in 3-4  treatments:  1. Patient will be independent with HEP to augment POC in 1 -2 treatments [x] Met [] Not met [] Partially met   2. Patient will demonstrate 80 deg passive forward flexion and 30 deg passive ER  [x] Met [] Not met [] Partially met   3. Patient will demonstrate full elbow extension pain free [] Met [] Not met [x] Partially met Patient has full ext, but still has pain      Long Term Goals: To be accomplished in 18  treatments:  1. Patient will demonstrate full PROM WNL in all direction [] Met [x] Not met [] Partially met   2. Patient pain will subside to </= 5/10 [] Met [] Not met [x] Partially met Most of the time pain is 4/10 but not always.   3. Patient will demonstrate AAFE and progress to AFE with decreased pain and less discomfort [] Met [x] Not met [] Partially met   4. Pt will report ability to use the L arm in ADLs including dressing and bathing without pain or difficulty [] Met [] Not met [x] Partially met   Patient can use her L arm in ADLs but still has difficulty.   PLAN  []  Upgrade activities as tolerated     []  Continue plan of care    []  Update interventions per flow sheet       []  Discharge due to:_  [x]  Other:_New POC    Sandy Li, PT, DPT 3/25/2021

## 2021-03-25 NOTE — PROGRESS NOTES
[address]  Ph: [phone]    Fax: [phone]  Therapy Progress Report  Name: Manuel Bernheim  : 1965   MD: Chris Martinez MD     Medical Diagnosis: Pain in left shoulder [M25.512]  Encounter for other specified aftercare [Z51.89]  Start of Care: 2020 Visits from Start of Care:14   Missed Visits: 0  Certification Period:  21 to 21      Frequency/Duration: 2 times a week for 20 treatments   Summary of Care/Goals:  Re-assessment was performed today as patient was not here for a month due to awaiting insurance authorization. There was not much of a change since last session, patient ROM increased by 5 deg in flexion, abduction. We continue to progress with AAROM, AROM and RC strengthening,  no report of pain but soreness reported. Patient continues to present with some posterior joint capsule tightness in ABD>ER. She still guarded with PROM and range did not change much since last session. She also continues to have difficulty getting up from a chair due to knee pain. Patient will continue to benefit from skilled PT services to modify and progress therapeutic interventions, address functional mobility deficits, address ROM deficits, address strength deficits and analyze and address soft tissue restrictions to attain remaining goals.           GOALS/Progress towards goals:  Patient/ Caregiver education and instruction: exercises  Patient Goal (s):  Regain shoulder movement and strength      Short Term Goals: To be accomplished in 3-4  treatments:  1. Patient will be independent with HEP to augment POC in 1 -2 treatments [x]? Met []? Not met []? Partially met   2. Patient will demonstrate 80 deg passive forward flexion and 30 deg passive ER  [x]? Met []? Not met []? Partially met   3. Patient will demonstrate full elbow extension pain free []? Met []? Not met [x]?  Partially met Patient has full ext, but still has pain      Long Term Goals: To be accomplished in 18  treatments:  1. Patient will demonstrate full PROM WNL in all direction []? Met [x]? Not met []? Partially met   2. Patient pain will subside to </= 5/10 []? Met []? Not met [x]? Partially met Most of the time pain is 4/10 but not always. 3. Patient will demonstrate AAFE and progress to AFE with decreased pain and less discomfort []? Met [x]? Not met []? Partially met   4. Pt will report ability to use the L arm in ADLs including dressing and bathing without pain or difficulty []? Met []? Not met [x]? Partially met   Patient can use her L arm in ADLs but still has difficulty. Recommendations: continue with therapy    [x]  Plan of care has been reviewed with PTA. Sandy Li, PT, DPT 3/25/2021     ________________________________________________________________________     Please retain this original for your records.

## 2021-03-26 ENCOUNTER — HOSPITAL ENCOUNTER (OUTPATIENT)
Dept: PHYSICAL THERAPY | Age: 56
Discharge: HOME | End: 2021-03-26
Payer: MEDICAID

## 2021-03-26 PROCEDURE — 97110 THERAPEUTIC EXERCISES: CPT

## 2021-03-26 NOTE — PROGRESS NOTES
PT DAILY TREATMENT NOTE - MCR     Patient Name: Zo Toney  Date:3/26/2021  : 1965  [x]  Patient  Verified  Payor: Maurice Sanchez / Plan: [employer] / Product Type: Managed Care Medicare /    Treatment Area: Pain in left shoulder [M25.512]  Encounter for other specified aftercare [Z51.89]   Next MD APPT: end of ?  In time: 11:15 pm Out time: 11:55 pm  Total Treatment Time (min):40  Total Timed Codes (min): 40 min  1:1 Treatment Time ( [address] only):40 min  Visit #: 15/33 (Visit count could not be calculated. Make sure you are using a visit which is associated with an episode. SUBJECTIVE  Pain Level (0-10 scale) pre treatment: 3/10  Any medication changes, allergies to medications, adverse drug reactions, diagnosis change, or new procedure performed?: [x] No    [] Yes (see summary sheet for update)  Subjective functional status/changes:   [x] No changes reported  Patient not reporting any problems after last session. She did decline the bike today. 40  min Therapeutic Exercise:  [x] See flow sheet : PROM   Rationale: increase ROM, increase strength and improve coordination to improve the patients ability to perform ADL's     min Manual Therapy: PROM in all plans as tolerated and gentle posterior capsule mob grade 1-2 , Myofascial release to pec/axillary    Rationale: decrease pain, increase ROM and increase tissue extensibility to improve the patients ability to gain range in all shoulder planes. With   [] TE   [] TA   [] neuro   [] other: Patient Education: [] Review HEP    [] Progressed/Changed HEP based on:   [] positioning   [] body mechanics   [] transfers   [] heat/ice application    [] other: Gave HO on wand exercises     Other Objective/Functional Measures: Added FPU's , and push box. Increased weight on rickshaw and some other exercises    ASSESSMENT/Changes in Function:   Patient was able to tolerate new exercises without increase in pain.  Due to patient Hx of B knee pain all exercises where performed in sitting. She did have increase soreness after treatment but declined modalities. Patient will continue to benefit from skilled PT services to modify and progress therapeutic interventions, address functional mobility deficits, address ROM deficits, address strength deficits and analyze and address soft tissue restrictions to attain remaining goals. GOALS/Progress towards goals:  Patient/ Caregiver education and instruction: exercises  Patient Goal (s):  Regain shoulder movement and strength      Short Term Goals: To be accomplished in 3-4  treatments:  1. Patient will be independent with HEP to augment POC in 1 -2 treatments [x] Met [] Not met [] Partially met   2. Patient will demonstrate 80 deg passive forward flexion and 30 deg passive ER  [x] Met [] Not met [] Partially met   3. Patient will demonstrate full elbow extension pain free [] Met [] Not met [x] Partially met Patient has full ext, but still has pain      Long Term Goals: To be accomplished in 18  treatments:  1. Patient will demonstrate full PROM WNL in all direction [] Met [x] Not met [] Partially met   2. Patient pain will subside to </= 5/10 [] Met [] Not met [x] Partially met Most of the time pain is 4/10 but not always. 3. Patient will demonstrate AAFE and progress to AFE with decreased pain and less discomfort [] Met [x] Not met [] Partially met   4. Pt will report ability to use the L arm in ADLs including dressing and bathing without pain or difficulty [] Met [] Not met [x] Partially met   Patient can use her L arm in ADLs but still has difficulty.   PLAN  []  Upgrade activities as tolerated     []  Continue plan of care    []  Update interventions per flow sheet       []  Discharge due to:_  [x]  Other:_New POC    Kenzie Leia, PTA 3/26/2021

## 2021-03-31 ENCOUNTER — HOSPITAL ENCOUNTER (OUTPATIENT)
Dept: PHYSICAL THERAPY | Age: 56
Discharge: HOME | End: 2021-03-31
Payer: MEDICAID

## 2021-03-31 PROCEDURE — 97110 THERAPEUTIC EXERCISES: CPT

## 2021-03-31 NOTE — PROGRESS NOTES
PT DAILY TREATMENT NOTE - [facility]     Patient Name: Tita Griffin  Date:3/31/2021  : 1965  [x]  Patient  Verified  Payor: Pamela Ross / Plan: [employer] / Product Type: Managed Care Medicare /    Treatment Area: Pain in left shoulder [M25.512]  Encounter for other specified aftercare [Z51.89]   Next MD APPT: end ?  In time: 01:00 pm Out time: 01:42 pm  Total Treatment Time (min):42  Total Timed Codes (min): 42 min  1:1 Treatment Time [facility] only):42min  Visit #:  (Visit count could not be calculated. Make sure you are using a visit which is associated with an episode. SUBJECTIVE  Pain Level (0-10 scale) pre treatment: 3/10  Any medication changes, allergies to medications, adverse drug reactions, diagnosis change, or new procedure performed?: [x] No    [] Yes (see summary sheet for update)  Subjective functional status/changes:   [x] No changes reported  Patient stated she can sleep on her L side now but not that long or often. Patient stated she mainly has difficulty putting on her shirts and fixing her hair. She reports no problems after last session. 42 min Therapeutic Exercise:  [x] See flow sheet : PROM   Rationale: increase ROM, increase strength and improve coordination to improve the patients ability to perform ADL's     min Manual Therapy: PROM in all plans as tolerated and gentle posterior capsule mob grade 1-2 , Myofascial release to pec/axillary    Rationale: decrease pain, increase ROM and increase tissue extensibility to improve the patients ability to gain range in all shoulder planes. With   [] TE   [] TA   [] neuro   [] other: Patient Education: [] Review HEP    [] Progressed/Changed HEP based on:   [] positioning   [] body mechanics   [] transfers   [] heat/ice application    [] other: Gave HO on wand exercises     Other Objective/Functional Measures: Added triangle and reverse triangle.     ASSESSMENT/Changes in Function:   Due to patient Hx of B knee pain all exercises where performed in sitting. Patient was able to increase resistance on theraband and tolerated new exercises with no complaints of increase pain. She did have increase in soreness following therapy. She was given OTB and GTB for HEP . She is reporting slight  Improvement with sleeping on L side. Her chief complaint is putting on her shirts and fixing her hair. Patient will continue to benefit from skilled PT services to modify and progress therapeutic interventions, address functional mobility deficits, address ROM deficits, address strength deficits and analyze and address soft tissue restrictions to attain remaining goals. GOALS/Progress towards goals:  Patient/ Caregiver education and instruction: exercises  Patient Goal (s):  Regain shoulder movement and strength      Short Term Goals: To be accomplished in 3-4  treatments:  1. Patient will be independent with HEP to augment POC in 1 -2 treatments [x] Met [] Not met [] Partially met   2. Patient will demonstrate 80 deg passive forward flexion and 30 deg passive ER  [x] Met [] Not met [] Partially met   3. Patient will demonstrate full elbow extension pain free [] Met [] Not met [x] Partially met Patient has full ext, but still has pain      Long Term Goals: To be accomplished in 18  treatments:  1. Patient will demonstrate full PROM WNL in all direction [] Met [x] Not met [] Partially met   2. Patient pain will subside to </= 5/10 [] Met [] Not met [x] Partially met Most of the time pain is 4/10 but not always. 3. Patient will demonstrate AAFE and progress to AFE with decreased pain and less discomfort [] Met [x] Not met [] Partially met   4. Pt will report ability to use the L arm in ADLs including dressing and bathing without pain or difficulty [] Met [] Not met [x] Partially met   Patient can use her L arm in ADLs but still has difficulty.   PLAN  [x]  Upgrade activities as tolerated     [x]  Continue plan of care [x]  Update interventions per flow sheet       []  Discharge due to:_  []  Other:_New POC    Kenzie Dupree, PTA 3/31/2021

## 2021-04-29 ENCOUNTER — OFFICE VISIT (OUTPATIENT)
Dept: ENDOCRINOLOGY | Age: 56
End: 2021-04-29
Payer: MEDICARE

## 2021-04-29 VITALS
HEART RATE: 60 BPM | HEIGHT: 71 IN | BODY MASS INDEX: 41.02 KG/M2 | SYSTOLIC BLOOD PRESSURE: 128 MMHG | DIASTOLIC BLOOD PRESSURE: 71 MMHG | TEMPERATURE: 99.2 F | OXYGEN SATURATION: 99 % | WEIGHT: 293 LBS | RESPIRATION RATE: 16 BRPM

## 2021-04-29 DIAGNOSIS — E78.2 HYPERLIPIDEMIA, MIXED: ICD-10-CM

## 2021-04-29 DIAGNOSIS — I10 ESSENTIAL HYPERTENSION WITH GOAL BLOOD PRESSURE LESS THAN 140/90: ICD-10-CM

## 2021-04-29 DIAGNOSIS — E11.40 TYPE 2 DIABETES MELLITUS WITH DIABETIC NEUROPATHY, WITHOUT LONG-TERM CURRENT USE OF INSULIN (HCC): Primary | ICD-10-CM

## 2021-04-29 PROCEDURE — G8510 SCR DEP NEG, NO PLAN REQD: HCPCS | Performed by: INTERNAL MEDICINE

## 2021-04-29 PROCEDURE — G8417 CALC BMI ABV UP PARAM F/U: HCPCS | Performed by: INTERNAL MEDICINE

## 2021-04-29 PROCEDURE — G8427 DOCREV CUR MEDS BY ELIG CLIN: HCPCS | Performed by: INTERNAL MEDICINE

## 2021-04-29 PROCEDURE — 2022F DILAT RTA XM EVC RTNOPTHY: CPT | Performed by: INTERNAL MEDICINE

## 2021-04-29 PROCEDURE — 3046F HEMOGLOBIN A1C LEVEL >9.0%: CPT | Performed by: INTERNAL MEDICINE

## 2021-04-29 PROCEDURE — G8754 DIAS BP LESS 90: HCPCS | Performed by: INTERNAL MEDICINE

## 2021-04-29 PROCEDURE — 99214 OFFICE O/P EST MOD 30 MIN: CPT | Performed by: INTERNAL MEDICINE

## 2021-04-29 PROCEDURE — G8752 SYS BP LESS 140: HCPCS | Performed by: INTERNAL MEDICINE

## 2021-04-29 PROCEDURE — 3017F COLORECTAL CA SCREEN DOC REV: CPT | Performed by: INTERNAL MEDICINE

## 2021-04-29 RX ORDER — PIOGLITAZONEHYDROCHLORIDE 30 MG/1
30 TABLET ORAL DAILY
Qty: 90 TAB | Refills: 3 | Status: SHIPPED | OUTPATIENT
Start: 2021-04-29 | End: 2021-07-29 | Stop reason: SDUPTHER

## 2021-04-29 NOTE — LETTER
4/29/2021 Patient: Inés Conner YOB: 1965 Date of Visit: 4/29/2021 JOVANI Salazar 
[facility] [address] [address] Fax: [phone] Dear JOVANI Salazar, Thank you for referring Ms. Lara Zapata to [facility] for evaluation. My notes for this consultation are attached. If you have questions, please do not hesitate to call me. I look forward to following your patient along with you. Sincerely, Jorge Jon MD

## 2021-04-29 NOTE — PROGRESS NOTES
Moni Nix MD        Patient Information  Date:4/29/2021  Name : Wanda Delgado 48 y.o.     YOB: 1965         Referred by: JOVANI Maier         Chief Complaint   Patient presents with    Diabetes           History of Present Illness: Wanda Delgado is a 48 y.o. female here for fu of  Type 2 Diabetes Mellitus. She was referred by JOVANI Maier for evaluation and management of diabetes. Not checking the blood glucose, has been gaining weight, diet high in carbohydrates  No severe hypoglycemia  On metformin  Off insulin, off pioglitazone    She is compliant with the medications        She was diagnosed with type 2 diabetes 30 years ago,         Wt Readings from Last 3 Encounters:   04/29/21 298 lb 1.6 oz (135.2 kg)   12/28/20 292 lb (132.5 kg)   12/18/20 292 lb (132.5 kg)       BP Readings from Last 3 Encounters:   12/28/20 (!) 158/83   12/18/20 (!) 154/75   12/09/20 114/71       Reviewed and updated this visit by clinical staff:  Tobacco  Allergies  Meds  Problems  Med Hx  Surg Hx  Fam Hx           Review of Systems:  - Per HPI    Physical Examination:   Height 5' 11\" (1.803 m), weight 298 lb 1.6 oz (135.2 kg). Estimated body mass index is 41.58 kg/m² as calculated from the following:    Height as of this encounter: 5' 11\" (1.803 m). -   Weight as of this encounter: 298 lb 1.6 oz (135.2 kg).   - General: pleasant, no distress, good eye contact  - HEENT: no exophthalmos, no periorbital edema, EOMI  - Neck: No thyromegaly  - CVS: S1-S2 regular  - RS: Normal respiratory effort  - Musculoskeletal: no tremors  - Neurological: alert and oriented  - Psychiatric: normal mood and affect  - Skin: Normal color      Data Reviewed:     Lab Results   Component Value Date/Time    Hemoglobin A1c 9.1 (H) 04/16/2021 10:36 AM    Hemoglobin A1c 8.4 (H) 12/07/2020 12:51 PM    Hemoglobin A1c 11.8 (H) 09/25/2020 03:29 PM    Hemoglobin A1c, External 7.0 03/04/2020    Glucose 199 (H) 04/16/2021 10:36 AM    Glucose  10/19/2018 10:10 AM    Microalb/Creat ratio (ug/mg creat.) 9 04/16/2021 10:36 AM    LDL,Direct 58 04/16/2021 10:36 AM    LDL, calculated 52 02/07/2019 08:58 AM    Creatinine 0.98 04/16/2021 10:36 AM      Lab Results   Component Value Date/Time    Cholesterol, total 103 02/07/2019 08:58 AM    HDL Cholesterol 38 (L) 02/07/2019 08:58 AM    LDL,Direct 58 04/16/2021 10:36 AM    LDL, calculated 52 02/07/2019 08:58 AM    LDL-C, External 46 06/09/2020    Triglyceride 64 02/07/2019 08:58 AM     Lab Results   Component Value Date/Time    GFR est non-AA 66 04/16/2021 10:36 AM    GFR est AA 76 04/16/2021 10:36 AM    Creatinine 0.98 04/16/2021 10:36 AM    BUN 10 04/16/2021 10:36 AM    Sodium 144 04/16/2021 10:36 AM    Potassium 4.1 04/16/2021 10:36 AM    Chloride 105 04/16/2021 10:36 AM    CO2 24 04/16/2021 10:36 AM         Assessment/Plan:     1. Type 2 diabetes mellitus with diabetic neuropathy, without long-term current use of insulin ([address]    2. Essential hypertension with goal blood pressure less than 140/90    3. Hyperlipidemia, mixed        1. Type 2 Diabetes Mellitus , neuropathy   Lab Results   Component Value Date/Time    Hemoglobin A1c 9.1 (H) 04/16/2021 10:36 AM    Hemoglobin A1c (POC) 5.4 10/19/2018 10:10 AM    Hemoglobin A1c, External 7.0 03/04/2020   Uncontrolled,   continue Metformin, Januvia  Does not want to go on Trulicity now, start Actos  She was on Actos, Lantus, Trulicity in the past    Advised to check glucose once daily  FLU annually ,Pneumovax ,aspirin daily,annual eye exam,microalbumin    2. HTN : Continue current therapy       3. Hyperlipidemia : Continue statin. 4.Obesity:Body mass index is 41.58 kg/m². Discussed about the importance of exercise and carbohydrate portion control.   Weight loss discussed            Peripheral neuropathy: Stable        Thank you for allowing me to participate in the care of this patient.     Michael Acosta MD    Patient verbalized understanding

## 2021-04-29 NOTE — PROGRESS NOTES
Identified pt with two pt identifiers(name and ). Reviewed record in preparation for visit and have obtained necessary documentation. Chief Complaint   Patient presents with    Diabetes        Health Maintenance Due   Topic    Hepatitis C Screening     Pneumococcal 0-64 years (1 of 1 - PPSV23)    COVID-19 Vaccine (1)    DTaP/Tdap/Td series (1 - Tdap)    PAP AKA CERVICAL CYTOLOGY     Shingrix Vaccine Age 49> (1 of 2)    Colorectal Cancer Screening Combo     Breast Cancer Screen Mammogram     Medicare Yearly Exam     Foot Exam Q1         Visit Vitals  /71 (BP 1 Location: Right arm, BP Patient Position: Sitting, BP Cuff Size: Large adult)   Pulse 60   Temp 99.2 °F (37.3 °C) (Oral)   Resp 16   Ht 5' 11\" (1.803 m)   Wt 298 lb 1.6 oz (135.2 kg)   SpO2 99%   BMI 41.58 kg/m²     Pain Scale: 0 - No pain/10    Coordination of Care Questionnaire:  :   1. Have you been to the ER, urgent care clinic since your last visit? Hospitalized since your last visit? No    2. Have you seen or consulted any other health care providers outside of the [address] [place] [address] since your last visit? Include any pap smears or colon screening.  No

## 2021-05-07 NOTE — ANCILLARY DISCHARGE INSTRUCTIONS
[address] [address] [facility] [address] Ph: [phone] Fax: [phone] Discharge Summary 2-15 Patient name: Levi Hilario  : 1965  Provider#: [id number] Referral source: Samuel Boss MD     
Medical/Treatment Diagnosis: Pain in left shoulder [M25.512] Encounter for other specified aftercare [Z51.89] Prior Hospitalization: see medical history Comorbidities: See Plan of Care Prior Level of Function: See Plan of Care Medications: Verified on Patient Summary List 
 
Start of Care: 20   Onset Date: Shoulder Sx Visits from Start of Care: 16  Missed Visits: 4 Reporting Period : 20 to 3/31/21 Assessment/Summary of care/GOALS:  
Patient has stop coming to therapy since changing to a new insurance patient will be D/C at this time. Short Term Goals: To be accomplished in 3-4  treatments: 1. Patient will be independent with HEP to augment POC in 1 -2 treatments [x]? Met []? Not met []? Partially met 2. Patient will demonstrate 80 deg passive forward flexion and 30 deg passive ER  [x]? Met []? Not met []? Partially met 3. Patient will demonstrate full elbow extension pain free []? Met []? Not met [x]? Partially met Patient has full ext, but still has pain  
  
Long Term Goals: To be accomplished in 18  treatments: 1. Patient will demonstrate full PROM WNL in all direction []? Met [x]? Not met []? Partially met 2. Patient pain will subside to </= 5/10 []? Met []? Not met [x]? Partially met Most of the time pain is 4/10 but not always. 3. Patient will demonstrate AAFE and progress to AFE with decreased pain and less discomfort []? Met [x]? Not met []? Partially met 4. Pt will report ability to use the L arm in ADLs including dressing and bathing without pain or difficulty []? Met []? Not met [x]? Partially met Patient can use her L arm in ADLs but still has difficulty.  
RECOMMENDATIONS: [x]Discontinue therapy: []Patient has reached or is progressing toward set goals and is independent with HEP [x]Patient is non-compliant or has abdicated 
   []Due to lack of appreciable progress towards set goals []Other Sandy Li, PT, DPT 5/7/2021

## 2021-07-16 PROBLEM — I25.10 ARTERIOSCLEROSIS OF CORONARY ARTERY: Status: ACTIVE | Noted: 2021-07-16

## 2021-07-16 PROBLEM — M75.112 NONTRAUMATIC INCOMPLETE TEAR OF LEFT ROTATOR CUFF: Status: ACTIVE | Noted: 2021-07-16

## 2021-07-16 PROBLEM — M51.36 DEGENERATION OF INTERVERTEBRAL DISC OF LUMBAR REGION: Status: ACTIVE | Noted: 2021-07-16

## 2021-07-16 PROBLEM — M19.90 IDIOPATHIC OSTEOARTHRITIS: Status: ACTIVE | Noted: 2021-07-16

## 2021-07-16 PROBLEM — F32.A DEPRESSION: Status: ACTIVE | Noted: 2021-07-16

## 2021-07-16 PROBLEM — M41.9 LUMBAR SCOLIOSIS: Status: ACTIVE | Noted: 2021-07-16

## 2021-07-23 LAB
BUN SERPL-MCNC: 9 MG/DL (ref 6–24)
BUN/CREAT SERPL: 11 (ref 9–23)
CALCIUM SERPL-MCNC: 8.6 MG/DL (ref 8.7–10.2)
CHLORIDE SERPL-SCNC: 104 MMOL/L (ref 96–106)
CO2 SERPL-SCNC: 25 MMOL/L (ref 20–29)
CREAT SERPL-MCNC: 0.81 MG/DL (ref 0.57–1)
EST. AVERAGE GLUCOSE BLD GHB EST-MCNC: 232 MG/DL
GLUCOSE SERPL-MCNC: 162 MG/DL (ref 65–99)
HBA1C MFR BLD: 9.7 % (ref 4.8–5.6)
POTASSIUM SERPL-SCNC: 4.1 MMOL/L (ref 3.5–5.2)
SODIUM SERPL-SCNC: 140 MMOL/L (ref 134–144)

## 2021-07-29 ENCOUNTER — OFFICE VISIT (OUTPATIENT)
Dept: ENDOCRINOLOGY | Age: 56
End: 2021-07-29
Payer: MEDICARE

## 2021-07-29 VITALS
WEIGHT: 293 LBS | OXYGEN SATURATION: 99 % | TEMPERATURE: 97.6 F | RESPIRATION RATE: 20 BRPM | SYSTOLIC BLOOD PRESSURE: 138 MMHG | HEIGHT: 71 IN | DIASTOLIC BLOOD PRESSURE: 61 MMHG | HEART RATE: 61 BPM | BODY MASS INDEX: 41.02 KG/M2

## 2021-07-29 DIAGNOSIS — I10 ESSENTIAL HYPERTENSION WITH GOAL BLOOD PRESSURE LESS THAN 140/90: ICD-10-CM

## 2021-07-29 DIAGNOSIS — E11.40 TYPE 2 DIABETES MELLITUS WITH DIABETIC NEUROPATHY, WITHOUT LONG-TERM CURRENT USE OF INSULIN (HCC): Primary | ICD-10-CM

## 2021-07-29 DIAGNOSIS — N32.81 OVERACTIVE BLADDER: ICD-10-CM

## 2021-07-29 DIAGNOSIS — L73.9 FOLLICULITIS: ICD-10-CM

## 2021-07-29 DIAGNOSIS — E78.2 HYPERLIPIDEMIA, MIXED: ICD-10-CM

## 2021-07-29 DIAGNOSIS — E11.40 TYPE 2 DIABETES MELLITUS WITH DIABETIC NEUROPATHY, WITHOUT LONG-TERM CURRENT USE OF INSULIN (HCC): ICD-10-CM

## 2021-07-29 DIAGNOSIS — E11.65 UNCONTROLLED TYPE 2 DIABETES MELLITUS WITH HYPERGLYCEMIA, WITH LONG-TERM CURRENT USE OF INSULIN (HCC): ICD-10-CM

## 2021-07-29 DIAGNOSIS — Z79.4 UNCONTROLLED TYPE 2 DIABETES MELLITUS WITH HYPERGLYCEMIA, WITH LONG-TERM CURRENT USE OF INSULIN (HCC): ICD-10-CM

## 2021-07-29 PROCEDURE — G8752 SYS BP LESS 140: HCPCS | Performed by: INTERNAL MEDICINE

## 2021-07-29 PROCEDURE — 3017F COLORECTAL CA SCREEN DOC REV: CPT | Performed by: INTERNAL MEDICINE

## 2021-07-29 PROCEDURE — 2022F DILAT RTA XM EVC RTNOPTHY: CPT | Performed by: INTERNAL MEDICINE

## 2021-07-29 PROCEDURE — G9717 DOC PT DX DEP/BP F/U NT REQ: HCPCS | Performed by: INTERNAL MEDICINE

## 2021-07-29 PROCEDURE — 3046F HEMOGLOBIN A1C LEVEL >9.0%: CPT | Performed by: INTERNAL MEDICINE

## 2021-07-29 PROCEDURE — G8417 CALC BMI ABV UP PARAM F/U: HCPCS | Performed by: INTERNAL MEDICINE

## 2021-07-29 PROCEDURE — 99214 OFFICE O/P EST MOD 30 MIN: CPT | Performed by: INTERNAL MEDICINE

## 2021-07-29 PROCEDURE — G8427 DOCREV CUR MEDS BY ELIG CLIN: HCPCS | Performed by: INTERNAL MEDICINE

## 2021-07-29 PROCEDURE — G8754 DIAS BP LESS 90: HCPCS | Performed by: INTERNAL MEDICINE

## 2021-07-29 RX ORDER — PIOGLITAZONEHYDROCHLORIDE 30 MG/1
30 TABLET ORAL DAILY
Qty: 90 TABLET | Refills: 3 | Status: SHIPPED | OUTPATIENT
Start: 2021-07-29 | End: 2021-11-16 | Stop reason: ALTCHOICE

## 2021-07-29 RX ORDER — DULAGLUTIDE 0.75 MG/.5ML
0.75 INJECTION, SOLUTION SUBCUTANEOUS
Qty: 12 SYRINGE | Refills: 3 | Status: SHIPPED | OUTPATIENT
Start: 2021-07-29 | End: 2021-10-29 | Stop reason: ALTCHOICE

## 2021-07-29 RX ORDER — METFORMIN HYDROCHLORIDE 1000 MG/1
1000 TABLET ORAL 2 TIMES DAILY WITH MEALS
Qty: 60 TABLET | Refills: 5 | Status: SHIPPED | OUTPATIENT
Start: 2021-07-29

## 2021-07-29 NOTE — PROGRESS NOTES
Mary Rose MD        Patient Information  Date:7/29/2021  Name : Christiane Shine 48 y.o.     YOB: 1965         Referred by: None         Chief Complaint   Patient presents with    Diabetes           History of Present Illness: Christiane Shine is a 48 y.o. female here for fu of  Type 2 Diabetes Mellitus. She was referred by None for evaluation and management of diabetes. Diet is high in carbohydrates  No severe hypoglycemia  On metformin  Off insulin, off pioglitazone  No weight loss  Not checking the blood glucose as it hurts her fingers  Not able to walk    She is compliant with the medications        She was diagnosed with type 2 diabetes 30 years ago,         Wt Readings from Last 3 Encounters:   07/29/21 299 lb (135.6 kg)   04/29/21 298 lb 1.6 oz (135.2 kg)   12/28/20 292 lb (132.5 kg)       BP Readings from Last 3 Encounters:   07/29/21 138/61   04/29/21 128/71   12/28/20 (!) 158/83       Reviewed and updated this visit by clinical staff:  Tobacco  Allergies  Med Hx  Surg Hx  Fam Hx  Soc Hx          Review of Systems:  - Per HPI    Physical Examination:   Blood pressure 138/61, pulse 61, temperature 97.6 °F (36.4 °C), temperature source Temporal, resp. rate 20, height 5' 11\" (1.803 m), weight 299 lb (135.6 kg), SpO2 99 %. Estimated body mass index is 41.7 kg/m² as calculated from the following:    Height as of this encounter: 5' 11\" (1.803 m). -   Weight as of this encounter: 299 lb (135.6 kg).   - General: pleasant, no distress, good eye contact  - HEENT: no exophthalmos, no periorbital edema, EOMI  - Neck: No thyromegaly  - CVS: S1-S2 regular  - RS: Normal respiratory effort  - Musculoskeletal: no tremors  - Neurological: alert and oriented  - Psychiatric: normal mood and affect  - Skin: Normal color    Diabetic foot exam ; July 2021    H/o partial or complete amputation of foot : No  H/o previous foot ulceration : No  H/o pre - ulcerative callus : No  H/o peripheral neuropathy and callus : No  H/o poor circulation  : No  Foot deformity : None    Data Reviewed:     Lab Results   Component Value Date/Time    Hemoglobin A1c 9.7 (H) 07/22/2021 12:00 AM    Hemoglobin A1c 9.1 (H) 04/16/2021 10:36 AM    Hemoglobin A1c 8.4 (H) 12/07/2020 12:51 PM    Hemoglobin A1c, External 7.0 03/04/2020 12:00 AM    Glucose 162 (H) 07/22/2021 12:00 AM    Glucose  10/19/2018 10:10 AM    Microalb/Creat ratio (ug/mg creat.) 9 04/16/2021 10:36 AM    LDL,Direct 58 04/16/2021 10:36 AM    LDL, calculated 52 02/07/2019 08:58 AM    Creatinine 0.81 07/22/2021 12:00 AM      Lab Results   Component Value Date/Time    Cholesterol, total 103 02/07/2019 08:58 AM    HDL Cholesterol 38 (L) 02/07/2019 08:58 AM    LDL,Direct 58 04/16/2021 10:36 AM    LDL, calculated 52 02/07/2019 08:58 AM    LDL-C, External 46 06/09/2020 12:00 AM    Triglyceride 64 02/07/2019 08:58 AM     Lab Results   Component Value Date/Time    GFR est non-AA 83 07/22/2021 12:00 AM    GFR est AA 95 07/22/2021 12:00 AM    Creatinine 0.81 07/22/2021 12:00 AM    BUN 9 07/22/2021 12:00 AM    Sodium 140 07/22/2021 12:00 AM    Potassium 4.1 07/22/2021 12:00 AM    Chloride 104 07/22/2021 12:00 AM    CO2 25 07/22/2021 12:00 AM         Assessment/Plan:     1. Type 2 diabetes mellitus with diabetic neuropathy, without long-term current use of insulin ([address]    2. Essential hypertension with goal blood pressure less than 140/90    3. Hyperlipidemia, mixed        1. Type 2 Diabetes Mellitus , neuropathy   Lab Results   Component Value Date/Time    Hemoglobin A1c 9.7 (H) 07/22/2021 12:00 AM    Hemoglobin A1c (POC) 5.4 10/19/2018 10:10 AM    Hemoglobin A1c, External 7.0 03/04/2020 12:00 AM   Uncontrolled,   continue Metformin,   Trulicity, Actos  She was on insulin in the past    Advised to check glucose once daily  FLU annually ,Pneumovax ,aspirin daily,annual eye exam,microalbumin    2.   HTN : Continue current therapy 3. Hyperlipidemia : Continue statin. 4.Obesity:Body mass index is 41.7 kg/m². Discussed about the importance of exercise and carbohydrate portion control. Weight loss discussed            Peripheral neuropathy: Stable        Thank you for allowing me to participate in the care of this patient.     Larayne Hamman, MD    Patient verbalized understanding

## 2021-07-29 NOTE — PROGRESS NOTES
Eugenia Mcconnell is a 48 y.o. female here for   Chief Complaint   Patient presents with    Diabetes       1. Have you been to the ER, urgent care clinic since your last visit? Hospitalized since your last visit? -no    2. Have you seen or consulted any other health care providers outside of the [address] [place] [address] since your last visit?   Include any pap smears or colon screening.-mo

## 2021-08-08 NOTE — PROGRESS NOTES
Dario Krishnamurthy MD        Patient Information  Date:9/25/2020  Name : Angel Flowers 49 y.o.     YOB: 1965         Referred by: Hailey Milner MD         Chief Complaint   Patient presents with    Diabetes           History of Present Illness: Angel Flowers is a 49 y.o. female here for fu of  Type 2 Diabetes Mellitus. She was referred by Hailey Milner MD for evaluation and management of diabetes. Gained weight, 30 pounds during pandemic    No severe hypoglycemia  On metformin  Off insulin, off pioglitazone    She is compliant with the medications        She was diagnosed with type 2 diabetes 30 years ago,         Wt Readings from Last 3 Encounters:   09/25/20 286 lb (129.7 kg)   12/12/19 258 lb 12.8 oz (117.4 kg)   08/05/19 248 lb (112.5 kg)       BP Readings from Last 3 Encounters:   09/25/20 (!) 140/71   12/12/19 126/66   08/05/19 124/56           Past Medical History:   Diagnosis Date    CAD (coronary artery disease)     Dr Luz Maria Curtis Diabetes [facility])     Hypertension      Current Outpatient Medications   Medication Sig    oxybutynin chloride XL (DITROPAN XL) 5 mg CR tablet Take 1 Tab by mouth daily.  aspirin delayed-release 81 mg tablet Take 81 mg by mouth daily.  metFORMIN (GLUCOPHAGE) 1,000 mg tablet Take 1 Tab by mouth two (2) times daily (with meals). Stop Glipizide    atorvastatin (LIPITOR) 40 mg tablet Take 40 mg by mouth daily.     gabapentin (NEURONTIN) 300 mg capsule TK 1 CAPSULE PO TID    lancets (ONETOUCH DELICA LANCETS) 30 gauge misc TEST BLOOD GLUCOSE TWICE DAILY    lancets (TRUEPLUS LANCETS) 33 gauge misc Test blood glucose twice daily Dx Code: E11.65    glucose blood VI test strips (ONETOUCH ULTRA BLUE TEST STRIP) strip TEST BLOOD GLUCOSE TWICE DAILY Dx Code:E 11.65    Blood-Glucose Meter (TRUE METRIX AIR GLUCOSE METER) monitoring kit Test blood glucose twice daily Dx Code: E11.65    lisinopril (PRINIVIL, ZESTRIL) 10 mg tablet Take 10 mg by mouth daily. No current facility-administered medications for this visit. No Known Allergies      Review of Systems:  - Constitutional Symptoms: no fevers, no chills, no weight loss  - Eyes: no blurry vision no double vision  - Musculoskeletal: No joint pains +  weakness  - Integumentary: no rashes  - Neurological: + numbness, tingling, no  headaches  - Psychiatric: no depression no  anxiety  - Endocrine: no heat or cold intolerance    Physical Examination:   Blood pressure (!) 140/71, pulse 62, temperature 97.5 °F (36.4 °C), temperature source Oral, resp. rate 16, height 5' 11\" (1.803 m), weight 286 lb (129.7 kg). Estimated body mass index is 39.89 kg/m² as calculated from the following:    Height as of this encounter: 5' 11\" (1.803 m). -   Weight as of this encounter: 286 lb (129.7 kg).   - General: pleasant, no distress, good eye contact  - HEENT: no exophthalmos, no periorbital edema, EOMI  - Neck: No visible thyromegaly  - RS: Normal respiratory effort  - Musculoskeletal: no tremors  - Neurological: alert and oriented  - Psychiatric: normal mood and affect  - Skin: Normal color    Diabetic foot exam: February 2019    Left: Skin -  xerosis   Vibratory sensation absent    Filament test absent sensation with micro filament   Pulse DP: 1+ (weak)    Deformities: onycomycosis    Right: Skin -  xerosis   Vibratory sensation absent   Filament test absent sensation with micro filament   Pulse DP: 2+ (normal)              Deformities: onychomycosis      Data Reviewed:     Lab Results   Component Value Date/Time    Hemoglobin A1c 6.9 (H) 12/05/2019 10:03 AM    Hemoglobin A1c 6.9 (H) 02/07/2019 08:58 AM    Hemoglobin A1c 5.9 (H) 05/11/2018 11:15 AM    Hemoglobin A1c, External 7.0 03/04/2020    Glucose 146 (H) 12/05/2019 10:03 AM    Glucose  10/19/2018 10:10 AM    Microalb/Creat ratio (ug/mg creat.) <1.5 12/05/2019 10:03 AM    LDL, calculated 52 02/07/2019 08:58 AM Creatinine 0.93 12/05/2019 10:03 AM      Lab Results   Component Value Date/Time    Cholesterol, total 103 02/07/2019 08:58 AM    HDL Cholesterol 38 (L) 02/07/2019 08:58 AM    LDL, calculated 52 02/07/2019 08:58 AM    LDL-C, External 59 03/04/2020    Triglyceride 64 02/07/2019 08:58 AM     Lab Results   Component Value Date/Time    GFR est non-AA 70 12/05/2019 10:03 AM    GFR est AA 81 12/05/2019 10:03 AM    Creatinine 0.93 12/05/2019 10:03 AM    BUN 10 12/05/2019 10:03 AM    Sodium 143 12/05/2019 10:03 AM    Potassium 4.4 12/05/2019 10:03 AM    Chloride 101 12/05/2019 10:03 AM    CO2 25 12/05/2019 10:03 AM         Assessment/Plan:     1. Type 2 diabetes mellitus with diabetic neuropathy, without long-term current use of insulin ([facility] [address]    2. Essential hypertension with goal blood pressure less than 140/90    3. Hyperlipidemia, mixed        1. Type 2 Diabetes Mellitus , neuropathy   Lab Results   Component Value Date/Time    Hemoglobin A1c 6.9 (H) 12/05/2019 10:03 AM    Hemoglobin A1c (POC) 5.4 10/19/2018 10:10 AM    Hemoglobin A1c, External 7.0 03/04/2020   Labs   continue Metformin, off Actos, off Lantus, Trulicity    Advised to check glucose once daily  FLU annually ,Pneumovax ,aspirin daily,annual eye exam,microalbumin    2. HTN : Continue current therapy       3. Hyperlipidemia : Continue statin. 4.Obesity:Body mass index is 39.89 kg/m². Discussed about the importance of exercise and carbohydrate portion control. Weight loss discussed            Peripheral neuropathy: Stable        Thank you for allowing me to participate in the care of this patient.     Carlyn Verde MD    Patient verbalized understanding Mother's Bedside/Non-

## 2021-09-15 NOTE — PROGRESS NOTES
COUNSELING: Padmini Banegas MD        Patient Information  Date:12/18/2020  Name : Adri Edwards 48 y.o.     YOB: 1965         Referred by: JOVANI Camejo         Chief Complaint   Patient presents with    Diabetes           History of Present Illness: Adri Edwards is a 48 y.o. female here for fu of  Type 2 Diabetes Mellitus. She was referred by JOVANI Camejo for evaluation and management of diabetes. Weight has been fluctuating  PCP started her on Januvia  No severe hypoglycemia  On metformin  Off insulin, off pioglitazone    She is compliant with the medications        She was diagnosed with type 2 diabetes 30 years ago,         Wt Readings from Last 3 Encounters:   12/18/20 292 lb (132.5 kg)   12/09/20 299 lb (135.6 kg)   09/25/20 286 lb (129.7 kg)       BP Readings from Last 3 Encounters:   12/18/20 (!) 154/75   12/09/20 114/71   09/25/20 (!) 140/71           Past Medical History:   Diagnosis Date    CAD (coronary artery disease)     Dr Lilliana Franco Diabetes [facility])     Hypertension      Current Outpatient Medications   Medication Sig    mirabegron ER (Myrbetriq) 25 mg ER tablet Take 25 mg by mouth daily.  Januvia 100 mg tablet TK 1 T PO ONCE D UTD    Blood-Glucose Meter (Accu-Chek Lorie Plus Meter) misc TEST BLOOD GLUCOSE TWICE DAILY Dx Code:E 11.65    glucose blood VI test strips (Accu-Chek Lorie Plus test strp) strip TEST BLOOD GLUCOSE TWICE DAILY Dx Code:E 11.65    lancets (Accu-Chek Softclix Lancets) misc TEST BLOOD GLUCOSE TWICE DAILY Dx Code:E 11.65    oxybutynin chloride XL (DITROPAN XL) 5 mg CR tablet Take 1 Tab by mouth daily.  aspirin delayed-release 81 mg tablet Take 81 mg by mouth daily.  metFORMIN (GLUCOPHAGE) 1,000 mg tablet Take 1 Tab by mouth two (2) times daily (with meals). Stop Glipizide    atorvastatin (LIPITOR) 40 mg tablet Take 40 mg by mouth daily.     dulaglutide (Trulicity) 7.39 JV/5.2 mL sub-q pen 0.5 mL by SubCUTAneous route every seven (7) days.  lisinopril (PRINIVIL, ZESTRIL) 10 mg tablet Take 10 mg by mouth daily. No current facility-administered medications for this visit. No Known Allergies      Review of Systems:  - Constitutional Symptoms: no fevers, no chills, no weight loss  - Eyes: no blurry vision no double vision  - Musculoskeletal: No joint pains +  weakness  - Integumentary: no rashes  - Neurological: + numbness, tingling, no  headaches  - Psychiatric: no depression no  anxiety  - Endocrine: no heat or cold intolerance    Physical Examination:   Blood pressure (!) 154/75, pulse (!) 53, temperature 97.9 °F (36.6 °C), temperature source Oral, resp. rate 16, height 5' 11\" (1.803 m), weight 292 lb (132.5 kg), SpO2 100 %. Estimated body mass index is 40.73 kg/m² as calculated from the following:    Height as of this encounter: 5' 11\" (1.803 m). -   Weight as of this encounter: 292 lb (132.5 kg).   - General: pleasant, no distress, good eye contact  - HEENT: no exophthalmos, no periorbital edema, EOMI  - Neck: No visible thyromegaly  - RS: Normal respiratory effort  - Musculoskeletal: no tremors  - Neurological: alert and oriented  - Psychiatric: normal mood and affect  - Skin: Normal color    Diabetic foot exam: February 2019    Left: Skin -  xerosis   Vibratory sensation absent    Filament test absent sensation with micro filament   Pulse DP: 1+ (weak)    Deformities: onycomycosis    Right: Skin -  xerosis   Vibratory sensation absent   Filament test absent sensation with micro filament   Pulse DP: 2+ (normal)              Deformities: onychomycosis      Data Reviewed:     Lab Results   Component Value Date/Time    Hemoglobin A1c 8.4 (H) 12/07/2020 12:51 PM    Hemoglobin A1c 11.8 (H) 09/25/2020 03:29 PM    Hemoglobin A1c 6.9 (H) 12/05/2019 10:03 AM    Hemoglobin A1c, External 7.0 03/04/2020    Glucose 134 (H) 12/07/2020 12:51 PM    Glucose  10/19/2018 10:10 AM    Microalb/Creat ratio (ug/mg creat.) 18 12/07/2020 12:51 PM    LDL,Direct 69 12/07/2020 12:51 PM    LDL, calculated 52 02/07/2019 08:58 AM    Creatinine 0.86 12/07/2020 12:51 PM      Lab Results   Component Value Date/Time    Cholesterol, total 103 02/07/2019 08:58 AM    HDL Cholesterol 38 (L) 02/07/2019 08:58 AM    LDL,Direct 69 12/07/2020 12:51 PM    LDL, calculated 52 02/07/2019 08:58 AM    LDL-C, External 46 06/09/2020    Triglyceride 64 02/07/2019 08:58 AM     Lab Results   Component Value Date/Time    GFR est non-AA 77 12/07/2020 12:51 PM    GFR est AA 89 12/07/2020 12:51 PM    Creatinine 0.86 12/07/2020 12:51 PM    BUN 14 12/07/2020 12:51 PM    Sodium 144 12/07/2020 12:51 PM    Potassium 4.4 12/07/2020 12:51 PM    Chloride 110 (H) 12/07/2020 12:51 PM    CO2 21 12/07/2020 12:51 PM         Assessment/Plan:     1. Type 2 diabetes mellitus with diabetic neuropathy, without long-term current use of insulin ([facility] [address]    2. Essential hypertension with goal blood pressure less than 140/90    3. Hyperlipidemia, mixed        1. Type 2 Diabetes Mellitus , neuropathy   Lab Results   Component Value Date/Time    Hemoglobin A1c 8.4 (H) 12/07/2020 12:51 PM    Hemoglobin A1c (POC) 5.4 10/19/2018 10:10 AM    Hemoglobin A1c, External 7.0 03/04/2020   Improved control   continue Metformin, she is on Januvia started by PCP , monitor and if A1C is still high switch to Trulicity  She was on Actos, Lantus, Trulicity in the past    Advised to check glucose once daily  FLU annually ,Pneumovax ,aspirin daily,annual eye exam,microalbumin    2. HTN : Continue current therapy       3. Hyperlipidemia : Continue statin. 4.Obesity:Body mass index is 40.73 kg/m². Discussed about the importance of exercise and carbohydrate portion control. Weight loss discussed            Peripheral neuropathy: Stable        Thank you for allowing me to participate in the care of this patient.     Giancarlo Douglas MD    Patient verbalized understanding

## 2021-10-21 ENCOUNTER — TELEPHONE (OUTPATIENT)
Dept: ENDOCRINOLOGY | Age: 56
End: 2021-10-21

## 2021-10-21 DIAGNOSIS — E11.40 TYPE 2 DIABETES MELLITUS WITH DIABETIC NEUROPATHY, WITHOUT LONG-TERM CURRENT USE OF INSULIN (HCC): Primary | ICD-10-CM

## 2021-10-21 DIAGNOSIS — E78.2 HYPERLIPIDEMIA, MIXED: ICD-10-CM

## 2021-10-21 NOTE — TELEPHONE ENCOUNTER
Order placed for pt,  per Verbal Order with read back from Dr Nicol Lennon 10/21/2021  Placed at  for .

## 2021-10-21 NOTE — TELEPHONE ENCOUNTER
Patient was scheduled for lab appt, but wants to have done tomorrow at her new PCP. Needs new lab order, Patient will  today at 2.   No orders in [facility]

## 2021-10-23 LAB
BUN SERPL-MCNC: 16 MG/DL (ref 6–24)
BUN/CREAT SERPL: 21 (ref 9–23)
CALCIUM SERPL-MCNC: 8.7 MG/DL (ref 8.7–10.2)
CHLORIDE SERPL-SCNC: 103 MMOL/L (ref 96–106)
CO2 SERPL-SCNC: 25 MMOL/L (ref 20–29)
CREAT SERPL-MCNC: 0.78 MG/DL (ref 0.57–1)
EST. AVERAGE GLUCOSE BLD GHB EST-MCNC: 143 MG/DL
GLUCOSE SERPL-MCNC: 83 MG/DL (ref 65–99)
HBA1C MFR BLD: 6.6 % (ref 4.8–5.6)
POTASSIUM SERPL-SCNC: 4.6 MMOL/L (ref 3.5–5.2)
SODIUM SERPL-SCNC: 141 MMOL/L (ref 134–144)

## 2021-10-27 ENCOUNTER — TELEPHONE (OUTPATIENT)
Dept: FAMILY MEDICINE CLINIC | Age: 56
End: 2021-10-27

## 2021-10-27 ENCOUNTER — NURSE TRIAGE (OUTPATIENT)
Dept: OTHER | Facility: CLINIC | Age: 56
End: 2021-10-27

## 2021-10-27 NOTE — TELEPHONE ENCOUNTER
----- Message from Lawyer Greene sent at 10/27/2021 11:08 AM EDT -----  Subject: Message to Provider    QUESTIONS  Information for Provider? pt was a nt transfer, and needs to be seen today   as per her disposition. swelling on both legs. is a new pt, est care.   ---------------------------------------------------------------------------  --------------  CALL BACK INFO  What is the best way for the office to contact you? OK to leave message on   voicemail  Preferred Call Back Phone Number? [phone]  ---------------------------------------------------------------------------  --------------  SCRIPT ANSWERS  Relationship to Patient?  Self

## 2021-10-27 NOTE — TELEPHONE ENCOUNTER
Received call from Cuong Chatman  at [facility] with [employer]. Brief description of triage: leg and ankle swelling for one month on and off, is diabetic, does not check her BS      Triage indicates for patient to be seen today    Care advice provided, patient verbalizes understanding; denies any other questions or concerns; instructed to call back for any new or worsening symptoms. Writer provided warm transfer to Maida at [facility] for appointment scheduling. Attention Provider: Thank you for allowing me to participate in the care of your patient. The patient was connected to triage in response to information provided to the [facility]. Please do not respond through this encounter as the response is not directed to a shared pool. Reason for Disposition   MODERATE swelling of both ankles (e.g., swelling extends up to the knees) AND new onset or worsening    Answer Assessment - Initial Assessment Questions  1. ONSET: \"When did the swelling start? \" (e.g., minutes, hours, days)      One month ago     2. LOCATION: \"What part of the leg is swollen? \"  \"Are both legs swollen or just one leg? \"      Both legs below the knee     3. SEVERITY: \"How bad is the swelling? \" (e.g., localized; mild, moderate, severe)   - Localized - small area of swelling localized to one leg   - MILD pedal edema - swelling limited to foot and ankle, pitting edema < 1/4 inch (6 mm) deep, rest and elevation eliminate most or all swelling   - MODERATE edema - swelling of lower leg to knee, pitting edema > 1/4 inch (6 mm) deep, rest and elevation only partially reduce swelling   - SEVERE edema - swelling extends above knee, facial or hand swelling present       Moderate     4. REDNESS: \"Does the swelling look red or infected? \"      Denies     5. PAIN: \"Is the swelling painful to touch? \" If so, ask: \"How painful is it? \"   (Scale 1-10; mild, moderate or severe)      6/10    6. FEVER: \"Do you have a fever? \" If so, ask: \"What is it, how was it measured, and when did it start? \"       Denies     7. CAUSE: \"What do you think is causing the leg swelling? \"      Does not know     8. MEDICAL HISTORY: \"Do you have a history of heart failure, kidney disease, liver failure, or cancer? \"      Has had stents in her heart     9. RECURRENT SYMPTOM: \"Have you had leg swelling before? \" If so, ask: \"When was the last time? \" \"What happened that time? \"      Denies     10. OTHER SYMPTOMS: \"Do you have any other symptoms? \" (e.g., chest pain, difficulty breathing)        Denies     11. PREGNANCY: \"Is there any chance you are pregnant? \" \"When was your last menstrual period? \"        Na    Protocols used: LEG SWELLING AND EDEMA-ADULT-OH

## 2021-10-29 ENCOUNTER — OFFICE VISIT (OUTPATIENT)
Dept: ENDOCRINOLOGY | Age: 56
End: 2021-10-29
Payer: MEDICARE

## 2021-10-29 VITALS
HEART RATE: 57 BPM | RESPIRATION RATE: 20 BRPM | SYSTOLIC BLOOD PRESSURE: 123 MMHG | OXYGEN SATURATION: 97 % | HEIGHT: 71 IN | TEMPERATURE: 97.6 F | DIASTOLIC BLOOD PRESSURE: 56 MMHG | WEIGHT: 293 LBS | BODY MASS INDEX: 41.02 KG/M2

## 2021-10-29 DIAGNOSIS — E66.9 NON MORBID OBESITY: ICD-10-CM

## 2021-10-29 DIAGNOSIS — E11.40 TYPE 2 DIABETES MELLITUS WITH DIABETIC NEUROPATHY, WITHOUT LONG-TERM CURRENT USE OF INSULIN (HCC): Primary | ICD-10-CM

## 2021-10-29 DIAGNOSIS — E78.2 HYPERLIPIDEMIA, MIXED: ICD-10-CM

## 2021-10-29 DIAGNOSIS — I10 ESSENTIAL HYPERTENSION WITH GOAL BLOOD PRESSURE LESS THAN 140/90: ICD-10-CM

## 2021-10-29 PROCEDURE — G9717 DOC PT DX DEP/BP F/U NT REQ: HCPCS | Performed by: INTERNAL MEDICINE

## 2021-10-29 PROCEDURE — 2022F DILAT RTA XM EVC RTNOPTHY: CPT | Performed by: INTERNAL MEDICINE

## 2021-10-29 PROCEDURE — G8417 CALC BMI ABV UP PARAM F/U: HCPCS | Performed by: INTERNAL MEDICINE

## 2021-10-29 PROCEDURE — 99214 OFFICE O/P EST MOD 30 MIN: CPT | Performed by: INTERNAL MEDICINE

## 2021-10-29 PROCEDURE — G8754 DIAS BP LESS 90: HCPCS | Performed by: INTERNAL MEDICINE

## 2021-10-29 PROCEDURE — 3044F HG A1C LEVEL LT 7.0%: CPT | Performed by: INTERNAL MEDICINE

## 2021-10-29 PROCEDURE — 3017F COLORECTAL CA SCREEN DOC REV: CPT | Performed by: INTERNAL MEDICINE

## 2021-10-29 PROCEDURE — G8427 DOCREV CUR MEDS BY ELIG CLIN: HCPCS | Performed by: INTERNAL MEDICINE

## 2021-10-29 PROCEDURE — G8752 SYS BP LESS 140: HCPCS | Performed by: INTERNAL MEDICINE

## 2021-10-29 RX ORDER — DULAGLUTIDE 1.5 MG/.5ML
1.5 INJECTION, SOLUTION SUBCUTANEOUS
Qty: 12 EACH | Refills: 3 | Status: SHIPPED | OUTPATIENT
Start: 2021-10-29 | End: 2022-02-25 | Stop reason: ALTCHOICE

## 2021-10-29 NOTE — PROGRESS NOTES
Raffy Aguero MD        Patient Information  Date:10/29/2021  Name : Bonnie Torres 48 y.o.     YOB: 1965         Referred by: None         Chief Complaint   Patient presents with    Diabetes           History of Present Illness: Bonnie Torres is a 48 y.o. female here for fu of  Type 2 Diabetes Mellitus. She was referred by None for evaluation and management of diabetes. Diet is high in carbohydrates  No severe hypoglycemia  On metformin, pioglitazone, tolerating Trulicity  No weight loss although on Trulicity  Not checking blood glucose  Not able to walk    She is compliant with the medications        She was diagnosed with type 2 diabetes 30 years ago,         Wt Readings from Last 3 Encounters:   10/29/21 304 lb (137.9 kg)   07/29/21 299 lb (135.6 kg)   04/29/21 298 lb 1.6 oz (135.2 kg)       BP Readings from Last 3 Encounters:   10/29/21 (!) 123/56   07/29/21 138/61   04/29/21 128/71       Reviewed and updated this visit by clinical staff:  Tobacco  Allergies  Meds  Problems  Med Hx  Surg Hx  Fam Hx  Soc Hx            Review of Systems:  - Per HPI    Physical Examination:   Blood pressure (!) 123/56, pulse (!) 57, temperature 97.6 °F (36.4 °C), temperature source Temporal, resp. rate 20, height 5' 11\" (1.803 m), weight 304 lb (137.9 kg), SpO2 97 %. Estimated body mass index is 42.4 kg/m² as calculated from the following:    Height as of this encounter: 5' 11\" (1.803 m). -   Weight as of this encounter: 304 lb (137.9 kg).   - General: pleasant, no distress, good eye contact  - HEENT: no exophthalmos, no periorbital edema, EOMI  - Neck: No thyromegaly  - CVS: S1-S2 regular  - RS: Normal respiratory effort  - Musculoskeletal: no tremors  - Neurological: alert and oriented  - Psychiatric: normal mood and affect  - Skin: Normal color    Diabetic foot exam ; July 2021    H/o partial or complete amputation of foot : No  H/o previous foot ulceration : No  H/o pre - ulcerative callus : No  H/o peripheral neuropathy and callus : No  H/o poor circulation  : No  Foot deformity : None    Data Reviewed:     Lab Results   Component Value Date/Time    Hemoglobin A1c 6.6 (H) 10/22/2021 12:00 AM    Hemoglobin A1c 9.7 (H) 07/22/2021 12:00 AM    Hemoglobin A1c 9.1 (H) 04/16/2021 10:36 AM    Hemoglobin A1c, External 7.0 03/04/2020 12:00 AM    Glucose 83 10/22/2021 12:00 AM    Glucose  10/19/2018 10:10 AM    Microalb/Creat ratio (ug/mg creat.) 9 04/16/2021 10:36 AM    LDL,Direct 58 04/16/2021 10:36 AM    LDL, calculated 52 02/07/2019 08:58 AM    Creatinine 0.78 10/22/2021 12:00 AM      Lab Results   Component Value Date/Time    Cholesterol, total 103 02/07/2019 08:58 AM    HDL Cholesterol 38 (L) 02/07/2019 08:58 AM    LDL,Direct 58 04/16/2021 10:36 AM    LDL, calculated 52 02/07/2019 08:58 AM    LDL-C, External 46 06/09/2020 12:00 AM    Triglyceride 64 02/07/2019 08:58 AM     Lab Results   Component Value Date/Time    GFR est non-AA 86 10/22/2021 12:00 AM    GFR est  10/22/2021 12:00 AM    Creatinine 0.78 10/22/2021 12:00 AM    BUN 16 10/22/2021 12:00 AM    Sodium 141 10/22/2021 12:00 AM    Potassium 4.6 10/22/2021 12:00 AM    Chloride 103 10/22/2021 12:00 AM    CO2 25 10/22/2021 12:00 AM         Assessment/Plan:     1. Type 2 diabetes mellitus with diabetic neuropathy, without long-term current use of insulin ([address]    2. Essential hypertension with goal blood pressure less than 140/90    3. Hyperlipidemia, mixed        1.  Type 2 Diabetes Mellitus , neuropathy   Lab Results   Component Value Date/Time    Hemoglobin A1c 6.6 (H) 10/22/2021 12:00 AM    Hemoglobin A1c (POC) 5.4 10/19/2018 10:10 AM    Hemoglobin A1c, External 7.0 03/04/2020 12:00 AM   Controlled   continue Metformin,   Trulicity, Actos  She was on insulin in the past, weight loss is the delgado which was discussed    Advised to check glucose once daily  FLU annually ,Pneumovax ,aspirin daily,annual eye exam,microalbumin    2. HTN : Continue current therapy       3. Hyperlipidemia : Continue statin. 4.Obesity:Body mass index is 42.4 kg/m². Discussed about the importance of exercise and carbohydrate portion control. Weight loss discussed      Peripheral neuropathy: Stable        Thank you for allowing me to participate in the care of this patient.     Diego Humphrey MD    Patient verbalized understanding

## 2021-10-29 NOTE — PROGRESS NOTES
Pamela Maza is a 48 y.o. female here for   Chief Complaint   Patient presents with    Diabetes       1. Have you been to the ER, urgent care clinic since your last visit? Hospitalized since your last visit? -no    2. Have you seen or consulted any other health care providers outside of the [address] [place] [address] since your last visit?   Include any pap smears or colon screening.-Dr. Paddy Worthington    C/o numbness in extremities

## 2021-11-16 ENCOUNTER — OFFICE VISIT (OUTPATIENT)
Dept: FAMILY MEDICINE CLINIC | Age: 56
End: 2021-11-16
Payer: MEDICARE

## 2021-11-16 VITALS
SYSTOLIC BLOOD PRESSURE: 142 MMHG | HEART RATE: 56 BPM | TEMPERATURE: 97.8 F | HEIGHT: 71 IN | DIASTOLIC BLOOD PRESSURE: 71 MMHG | OXYGEN SATURATION: 99 % | WEIGHT: 293 LBS | BODY MASS INDEX: 41.02 KG/M2

## 2021-11-16 DIAGNOSIS — R60.0 LEG EDEMA: ICD-10-CM

## 2021-11-16 DIAGNOSIS — I25.10 ARTERIOSCLEROSIS OF CORONARY ARTERY: ICD-10-CM

## 2021-11-16 DIAGNOSIS — E11.40 TYPE 2 DIABETES MELLITUS WITH DIABETIC NEUROPATHY, WITHOUT LONG-TERM CURRENT USE OF INSULIN (HCC): Primary | ICD-10-CM

## 2021-11-16 DIAGNOSIS — I10 ESSENTIAL HYPERTENSION WITH GOAL BLOOD PRESSURE LESS THAN 140/90: ICD-10-CM

## 2021-11-16 PROCEDURE — 99203 OFFICE O/P NEW LOW 30 MIN: CPT | Performed by: FAMILY MEDICINE

## 2021-11-16 PROCEDURE — G8754 DIAS BP LESS 90: HCPCS | Performed by: FAMILY MEDICINE

## 2021-11-16 PROCEDURE — 2022F DILAT RTA XM EVC RTNOPTHY: CPT | Performed by: FAMILY MEDICINE

## 2021-11-16 PROCEDURE — 3044F HG A1C LEVEL LT 7.0%: CPT | Performed by: FAMILY MEDICINE

## 2021-11-16 PROCEDURE — G8417 CALC BMI ABV UP PARAM F/U: HCPCS | Performed by: FAMILY MEDICINE

## 2021-11-16 PROCEDURE — G8753 SYS BP > OR = 140: HCPCS | Performed by: FAMILY MEDICINE

## 2021-11-16 PROCEDURE — G8427 DOCREV CUR MEDS BY ELIG CLIN: HCPCS | Performed by: FAMILY MEDICINE

## 2021-11-16 PROCEDURE — G9717 DOC PT DX DEP/BP F/U NT REQ: HCPCS | Performed by: FAMILY MEDICINE

## 2021-11-16 PROCEDURE — 3017F COLORECTAL CA SCREEN DOC REV: CPT | Performed by: FAMILY MEDICINE

## 2021-11-16 NOTE — PROGRESS NOTES
IDENTIFYING INFORMATION:      Eugenio Zapata , 55 y.o., female  [address] [facility].  [place],  [facility] [address]     Medical Record Number: [id number]    E and M CODING:  NEW patient      Patient Active Problem List   Diagnosis Code    Type II diabetes mellitus, uncontrolled ([facility] E11.65    Hyperlipidemia, mixed E78.2    Overactive bladder N32.81    Essential hypertension with goal blood pressure less than 140/90 I10    Uncontrolled type 2 diabetes mellitus with hyperglycemia, with long-term current use of insulin ([facility]) E11.65, Z79.4    Type 2 diabetes mellitus with diabetic neuropathy ([facility]) E11.40    Type 2 diabetes mellitus with diabetic neuropathy, without long-term current use of insulin ([facility]) E11.40    Severe obesity (BMI 35.0-39. 9) with comorbidity ([facility] E66.01    Arteriosclerosis of coronary artery I25.10    Degeneration of intervertebral disc of lumbar region M51.36    Depression F32. A    Idiopathic osteoarthritis M19.90    Lumbar scoliosis M41.9    Nontraumatic incomplete tear of left rotator cuff M75.112           CHIEF COMPLAINT:   Chief Complaint   Patient presents with   [address] [place] [address] Patient     c/o swelling in bilateral feet and legs    Physical         HISTORY OF PRESENT ILLNESS:    Toni Ruiz is a 55 y.o. female . she comes in for New patient visit. Legs swelling all the time. Come and go. Can be in the morning and at night. Has neuropathy. Sees endocrinologist.  Doesn't check sugars too much. Interestingly, prior to getting the history she received a phone call and asked me. I left the room for about 5 minutes came back she is finished. Otherwise, we discussed her concerns which include the swelling and the neuropathy. She is not sure what is happening. She is never had a Doppler. She denies any chest pain or palpitations orthopnea or PND. She does have trouble walking and uses a cane. She does have a history of atherosclerotic heart disease via having a stent placed.   She has not had a heart attack per se. PAST MEDICAL HISTORY:     Past Medical History:   Diagnosis Date    CAD (coronary artery disease)     Dr [facility])     Hypercholesterolemia     Hypertension        MEDICATIONS:     Current Outpatient Medications on File Prior to Visit   Medication Sig Dispense Refill    dulaglutide (Trulicity) 1.5 NK/0.4 mL sub-q pen 0.5 mL by SubCUTAneous route every seven (7) days. Stop 0.75 mg 12 Each 3    pioglitazone (ACTOS) 30 mg tablet Take 1 Tablet by mouth daily. 90 Tablet 3    metFORMIN (GLUCOPHAGE) 1,000 mg tablet Take 1 Tablet by mouth two (2) times daily (with meals). Stop Glipizide 60 Tablet 5    mirabegron ER (Myrbetriq) 25 mg ER tablet Take 25 mg by mouth daily.  Blood-Glucose Meter (Accu-Chek Lorie Plus Meter) misc TEST BLOOD GLUCOSE TWICE DAILY Dx Code:E 11.65 1 Each 0    glucose blood VI test strips (Accu-Chek Lorie Plus test strp) strip TEST BLOOD GLUCOSE TWICE DAILY Dx Code:E 11. 100 Strip 6    lancets (Accu-Chek Softclix Lancets) misc TEST BLOOD GLUCOSE TWICE DAILY Dx Code:E 11.65 100 Each 6    aspirin delayed-release 81 mg tablet Take 81 mg by mouth daily.  atorvastatin (LIPITOR) 40 mg tablet Take 40 mg by mouth daily.  [DISCONTINUED] oxybutynin chloride XL (DITROPAN XL) 5 mg CR tablet Take 1 Tab by mouth daily. (Patient taking differently: Take 5 mg by mouth daily. Pt states she takes 10 mg) 30 Tab 5    [DISCONTINUED] lisinopril (PRINIVIL, ZESTRIL) 10 mg tablet Take 10 mg by mouth daily. (Patient not taking: Reported on 2021)       No current facility-administered medications on file prior to visit.        ALLERGIES:    No Known Allergies      SOCIAL HISTORY:     Social History     Socioeconomic History    Marital status: UNKNOWN   Tobacco Use    Smoking status: Former Smoker     Quit date:      Years since quittin.8    Smokeless tobacco: Never Used   Vaping Use    Vaping Use: Never used   Substance and Sexual Activity    Alcohol use: Yes     Alcohol/week: 0.0 standard drinks    Drug use: Never         SURGICAL HISTORY:    Past Surgical History:   Procedure Laterality Date    HX CORONARY STENT PLACEMENT      x 4    HX ROTATOR CUFF REPAIR Left 11/06/2020        FAMILY HISTORY:    Family History   Problem Relation Age of Onset    Heart Disease Mother     Hypertension Father     Emphysema Father     Cancer Maternal Aunt         breast         REVIEW OF SYSTEMS:    I personally collected this information from all available source present (patient/others in room and records available) -JLEWISDO  Review of Systems   Constitutional: Negative for chills, diaphoresis and fever. HENT: Positive for congestion (in morning. ). Negative for ear pain, sinus pain, sore throat and tinnitus. Eyes: Negative for blurred vision and double vision. Respiratory: Negative for cough, sputum production, shortness of breath and wheezing. Cardiovascular: Positive for leg swelling. Negative for chest pain, palpitations, orthopnea and PND. Gastrointestinal: Negative for abdominal pain, constipation, diarrhea, heartburn, nausea and vomiting. Genitourinary: Positive for frequency and urgency. Negative for dysuria. Musculoskeletal: Positive for joint pain. Negative for back pain, myalgias and neck pain. Skin: Negative for itching and rash. Neurological: Positive for tingling and sensory change. Negative for dizziness, weakness and headaches. Endo/Heme/Allergies: Does not bruise/bleed easily. Psychiatric/Behavioral: Negative for depression. The patient is not nervous/anxious.           PHYSICAL EXAMINATION:    Vital Signs:    Visit Vitals  BP (!) 142/71 (BP 1 Location: Left upper arm, BP Patient Position: Sitting)   Pulse (!) 56   Temp 97.8 °F (36.6 °C) (Temporal)   Ht 5' 11\" (1.803 m)   Wt 302 lb (137 kg)   SpO2 99%   BMI 42.12 kg/m²         Wt Readings from Last 3 Encounters:   11/16/21 302 lb (137 kg)   10/29/21 304 lb (137.9 kg) 07/29/21 299 lb (135.6 kg)     BP Readings from Last 3 Encounters:   11/16/21 (!) 142/71   10/29/21 (!) 123/56   07/29/21 138/61         Physical Exam  Nursing note reviewed. Constitutional:       General: She is not in acute distress. Appearance: Normal appearance. She is obese. She is not ill-appearing or toxic-appearing. HENT:      Right Ear: Tympanic membrane, ear canal and external ear normal.      Left Ear: Tympanic membrane, ear canal and external ear normal.      Nose: No congestion or rhinorrhea. Mouth/Throat:      Pharynx: No oropharyngeal exudate or posterior oropharyngeal erythema. Eyes:      General: No scleral icterus. Extraocular Movements: Extraocular movements intact. Conjunctiva/sclera: Conjunctivae normal.      Pupils: Pupils are equal, round, and reactive to light. Neck:      Vascular: No carotid bruit. Cardiovascular:      Rate and Rhythm: Normal rate and regular rhythm. Heart sounds: No murmur heard. No friction rub. No gallop. Pulmonary:      Effort: Pulmonary effort is normal.      Breath sounds: Normal breath sounds. No wheezing, rhonchi or rales. Abdominal:      General: Bowel sounds are normal. There is no distension. Palpations: Abdomen is soft. There is no mass. Tenderness: There is no abdominal tenderness. Musculoskeletal:         General: No swelling, tenderness or deformity. Cervical back: No rigidity. No muscular tenderness. Right lower leg: Edema present. Left lower leg: Edema present. Comments: +1 edema bilaterally   Lymphadenopathy:      Cervical: No cervical adenopathy. Skin:     Coloration: Skin is not jaundiced. Findings: No erythema or rash. Neurological:      General: No focal deficit present. Mental Status: She is alert and oriented to person, place, and time. Mental status is at baseline.    Psychiatric:         Mood and Affect: Mood normal.         Behavior: Behavior normal. Thought Content: Thought content normal.         Judgment: Judgment normal.           ASSESSMENT/PLAN:       New patient visit today.  Her biggest concern is the edema.  Actos may cause that so we stopped that   I did add Jardiance and she will discuss with her endocrinologist.  Tenzin I also ordered venous Dopplers to help peripheral venous insufficiency.  I did order some routine lab work.  Follow up and treat as indicated. ICD-10-CM ICD-9-CM    1. Type 2 diabetes mellitus with diabetic neuropathy, without long-term current use of insulin (HCC)  E11.40 250.60 empagliflozin (Jardiance) 10 mg tablet     357.2    2. Leg edema  R60.0 782.3 DUPLEX LOWER EXT VENOUS BILAT      METABOLIC PANEL, COMPREHENSIVE      CBC WITH AUTOMATED DIFF      TSH 3RD GENERATION      URINALYSIS W/ RFLX MICROSCOPIC   3. Essential hypertension with goal blood pressure less than 140/90  H97 494.6 METABOLIC PANEL, COMPREHENSIVE      CBC WITH AUTOMATED DIFF      TSH 3RD GENERATION      URINALYSIS W/ RFLX MICROSCOPIC   4. Arteriosclerosis of coronary artery  X74.93 036.47 METABOLIC PANEL, COMPREHENSIVE      CBC WITH AUTOMATED DIFF      TSH 3RD GENERATION      URINALYSIS W/ RFLX MICROSCOPIC     Discussion (regarding today's visit with Cuco Cabrera);  Dima Barahona gave the patient a review of medications, treatment, testing such as labs, imagine, referrals and when to call regarding results and appointments.  Reminded patient to keep any and all appointments with specialists, labs, imaging.  Reminded patient to make sure we get copies of any specialists care, labs and imaging.  Reminded patient to call of come by the office if there are any concerns, questions , comments or problems.  The patient verbalized understanding of the care plan and all questions were answered to the patient's satisfaction prior to leaving the office.     The patient was told that failure to comply with recommended testing could result in abnormal health consequences.  The patient was instructed to have yearly routine health maintenance including but not limited to age appropriate vaccines, testing, screening exams.  ALL questions were answered to her satisfaction before leaving the office. The patient actively participated in medical decision making. This date I spent  16 minutes reviewing chart, previous notes, tests and interviewing and examining patients answering questions providing follow up as well as ordering tests. FOLLOW UP:     Patient knows to keep any and all future visits scheduled unless told otherwise. Patient knows to call, come back if any concerns, questions, comments or problems arise. Follow-up and Dispositions    · Return in about 4 weeks (around 12/14/2021) for Follow up edema. Signed By: Jonas Elkins DO     November 16, 2021     TIME: 6:07 pm    This visit was reviewed and signed electronically. It was been completed with voice recognition software and hand typing. It may have syntax and spelling errors despite editing.

## 2021-11-16 NOTE — PROGRESS NOTES
Chief Complaint   Patient presents with   [address] [place] [address] Patient     c/o swelling in bilateral feet and legs    Physical       Visit Vitals  BP (!) 146/80 (BP 1 Location: Left upper arm, BP Patient Position: Sitting)   Pulse 81   Temp 97.8 °F (36.6 °C) (Temporal)   Ht 5' 11\" (1.803 m)   Wt 302 lb (137 kg)   SpO2 99%   BMI 42.12 kg/m²

## 2021-11-16 NOTE — PATIENT INSTRUCTIONS
General Health and concerns:  HEART HEALTHY DIET:  A heart healthy diet is one that is low in cholesterol (less than 300 mg daily), fat (less than 80 g daily) . You should also minimize carbohydrates / sugars (less amounts of breads, pastas, potato and potato products and sugary foods/snacks, cookies, cakes, etc) . Try to eat whole wheat/multigrain breads and pastas and eat more vegetables. Cook with olive oil (or no oil) and grill, bake, broil or boil foods. Less red meat and more chicken , fish and lean cuts of beef (limited). 6110-2029 calories per day is sufficient 0653-7807 is acceptable for weight loss. EXERCISE:  You should do exercise 3-5 days per week (minimum) to include increasing your heart rate for 30 to 45 minutes. At least a pace of a brisk walk should do that. This build up your heart and lung endurance and muscles and helps many function of the body. OTHER:    IF your condition(s) do not improve, get worse and/or if any concerns arise, please call or come by the office. Routine Health maintenance: You need to get a yearly follow up/physical exam to review, discuss age and gender appropriate exams, labs, vaccines and screening tests. This includes cardiovascular health risk, cancer screens and other juan related topics. Medications-Take all medications as directed. Please do not stop unless you talk to your doctor or health care provider first. Report any problems immediately. PLEASE CHECK THE LIST FROM TODAY's VISIT and make SURE IT IS ACCURATE-Please bring any issues to our concern IMMEDIATELY!! Referrals: if you have been given a referral, please call the office if you do not hear from provider in one week. You may make the appointment yourself. Please keep all appointments with specialists and ask them to send their notes, thoughts, recommendations to us , as your PCP.     KEEP all upcoming appointments with our office UNLESS otherwise and specifically told not to. CHECK your diagnosis/problem list for today and that orders and prescriptions are what we discussed as well as MAKE sure all information is accurate and has been discussed to your satisfaction. PLEASE make sure all your questions have been answered and feel free to call or come back should any concerns arise. Imaging/Labs:  Be sure to get these images in a timely manner. IF your test must be scheduled, let us know if you need help getting this done and if you do not hear from that provider in a week , call us or them. BE SURE to call the office if you do not hear regarding the results in one week after the test is performed Image or lab). It is our intention to inform you of the results ALWAYS, even if normal you should get a notification (Call, portal message). PLEASE chris if you do not get the results. PLEASE follow all recommendations and call/come in /ask questions if you do not understand of if problems develop after or in between visits. Failure to comply with recommended health care advise could result in serious health consequences. Thank you for choosing our practice and please let us know how we can help you feel better and stay well!

## 2021-11-24 LAB
ALBUMIN SERPL-MCNC: 4.3 G/DL (ref 3.8–4.9)
ALBUMIN/GLOB SERPL: 1.7 {RATIO} (ref 1.2–2.2)
ALP SERPL-CCNC: 92 IU/L (ref 44–121)
ALT SERPL-CCNC: 13 IU/L (ref 0–32)
APPEARANCE UR: CLEAR
AST SERPL-CCNC: 17 IU/L (ref 0–40)
BASOPHILS # BLD AUTO: 0 X10E3/UL (ref 0–0.2)
BASOPHILS NFR BLD AUTO: 1 %
BILIRUB SERPL-MCNC: 0.6 MG/DL (ref 0–1.2)
BILIRUB UR QL STRIP: NEGATIVE
BUN SERPL-MCNC: 11 MG/DL (ref 6–24)
BUN/CREAT SERPL: 11 (ref 9–23)
CALCIUM SERPL-MCNC: 8.7 MG/DL (ref 8.7–10.2)
CHLORIDE SERPL-SCNC: 105 MMOL/L (ref 96–106)
CO2 SERPL-SCNC: 20 MMOL/L (ref 20–29)
COLOR UR: YELLOW
CREAT SERPL-MCNC: 0.96 MG/DL (ref 0.57–1)
EOSINOPHIL # BLD AUTO: 0 X10E3/UL (ref 0–0.4)
EOSINOPHIL NFR BLD AUTO: 1 %
ERYTHROCYTE [DISTWIDTH] IN BLOOD BY AUTOMATED COUNT: 12.5 % (ref 11.7–15.4)
GLOBULIN SER CALC-MCNC: 2.5 G/DL (ref 1.5–4.5)
GLUCOSE SERPL-MCNC: 77 MG/DL (ref 65–99)
GLUCOSE UR QL: ABNORMAL
HCT VFR BLD AUTO: 38.4 % (ref 34–46.6)
HGB BLD-MCNC: 12.6 G/DL (ref 11.1–15.9)
HGB UR QL STRIP: NEGATIVE
IMM GRANULOCYTES # BLD AUTO: 0 X10E3/UL (ref 0–0.1)
IMM GRANULOCYTES NFR BLD AUTO: 0 %
KETONES UR QL STRIP: NEGATIVE
LEUKOCYTE ESTERASE UR QL STRIP: NEGATIVE
LYMPHOCYTES # BLD AUTO: 1.3 X10E3/UL (ref 0.7–3.1)
LYMPHOCYTES NFR BLD AUTO: 35 %
MCH RBC QN AUTO: 28.1 PG (ref 26.6–33)
MCHC RBC AUTO-ENTMCNC: 32.8 G/DL (ref 31.5–35.7)
MCV RBC AUTO: 86 FL (ref 79–97)
MICRO URNS: ABNORMAL
MONOCYTES # BLD AUTO: 0.2 X10E3/UL (ref 0.1–0.9)
MONOCYTES NFR BLD AUTO: 6 %
NEUTROPHILS # BLD AUTO: 2.2 X10E3/UL (ref 1.4–7)
NEUTROPHILS NFR BLD AUTO: 57 %
NITRITE UR QL STRIP: NEGATIVE
PH UR STRIP: 5.5 [PH] (ref 5–7.5)
PLATELET # BLD AUTO: 190 X10E3/UL (ref 150–450)
POTASSIUM SERPL-SCNC: 4.5 MMOL/L (ref 3.5–5.2)
PROT SERPL-MCNC: 6.8 G/DL (ref 6–8.5)
PROT UR QL STRIP: ABNORMAL
RBC # BLD AUTO: 4.49 X10E6/UL (ref 3.77–5.28)
SODIUM SERPL-SCNC: 142 MMOL/L (ref 134–144)
SP GR UR: >=1.03 (ref 1–1.03)
TSH SERPL DL<=0.005 MIU/L-ACNC: 0.76 UIU/ML (ref 0.45–4.5)
UROBILINOGEN UR STRIP-MCNC: 1 MG/DL (ref 0.2–1)
WBC # BLD AUTO: 3.8 X10E3/UL (ref 3.4–10.8)

## 2021-11-30 ENCOUNTER — HOSPITAL ENCOUNTER (OUTPATIENT)
Dept: NON INVASIVE DIAGNOSTICS | Age: 56
Discharge: HOME | End: 2021-11-30
Attending: FAMILY MEDICINE
Payer: MEDICARE

## 2021-11-30 DIAGNOSIS — R60.0 LEG EDEMA: ICD-10-CM

## 2021-11-30 PROCEDURE — 93970 EXTREMITY STUDY: CPT

## 2021-12-02 NOTE — PROGRESS NOTES
Liver, kidney sugar normal  Blood count is normal without anemia or infection  Thyroid is normal  Urine shows 3+ sugar which is high  WE need to check A1c next visit.

## 2021-12-07 RX ORDER — OXYBUTYNIN CHLORIDE 5 MG/1
TABLET, EXTENDED RELEASE ORAL
Qty: 90 TABLET | Refills: 1 | Status: SHIPPED | OUTPATIENT
Start: 2021-12-07 | End: 2022-08-08

## 2021-12-20 ENCOUNTER — OFFICE VISIT (OUTPATIENT)
Dept: FAMILY MEDICINE CLINIC | Age: 56
End: 2021-12-20
Payer: MEDICARE

## 2021-12-20 VITALS
WEIGHT: 293 LBS | HEART RATE: 61 BPM | BODY MASS INDEX: 41.02 KG/M2 | HEIGHT: 71 IN | DIASTOLIC BLOOD PRESSURE: 75 MMHG | OXYGEN SATURATION: 98 % | SYSTOLIC BLOOD PRESSURE: 147 MMHG | TEMPERATURE: 97.3 F

## 2021-12-20 DIAGNOSIS — M54.31 SCIATICA OF RIGHT SIDE: Primary | ICD-10-CM

## 2021-12-20 DIAGNOSIS — I25.10 ARTERIOSCLEROSIS OF CORONARY ARTERY: ICD-10-CM

## 2021-12-20 DIAGNOSIS — I10 ESSENTIAL HYPERTENSION WITH GOAL BLOOD PRESSURE LESS THAN 140/90: ICD-10-CM

## 2021-12-20 DIAGNOSIS — E11.40 TYPE 2 DIABETES MELLITUS WITH DIABETIC NEUROPATHY, WITHOUT LONG-TERM CURRENT USE OF INSULIN (HCC): ICD-10-CM

## 2021-12-20 DIAGNOSIS — R60.0 LEG EDEMA: ICD-10-CM

## 2021-12-20 PROCEDURE — 3044F HG A1C LEVEL LT 7.0%: CPT | Performed by: FAMILY MEDICINE

## 2021-12-20 PROCEDURE — 3017F COLORECTAL CA SCREEN DOC REV: CPT | Performed by: FAMILY MEDICINE

## 2021-12-20 PROCEDURE — 99213 OFFICE O/P EST LOW 20 MIN: CPT | Performed by: FAMILY MEDICINE

## 2021-12-20 PROCEDURE — 2022F DILAT RTA XM EVC RTNOPTHY: CPT | Performed by: FAMILY MEDICINE

## 2021-12-20 PROCEDURE — G8427 DOCREV CUR MEDS BY ELIG CLIN: HCPCS | Performed by: FAMILY MEDICINE

## 2021-12-20 PROCEDURE — G8753 SYS BP > OR = 140: HCPCS | Performed by: FAMILY MEDICINE

## 2021-12-20 PROCEDURE — G8754 DIAS BP LESS 90: HCPCS | Performed by: FAMILY MEDICINE

## 2021-12-20 PROCEDURE — G9717 DOC PT DX DEP/BP F/U NT REQ: HCPCS | Performed by: FAMILY MEDICINE

## 2021-12-20 PROCEDURE — 96372 THER/PROPH/DIAG INJ SC/IM: CPT | Performed by: FAMILY MEDICINE

## 2021-12-20 PROCEDURE — G8417 CALC BMI ABV UP PARAM F/U: HCPCS | Performed by: FAMILY MEDICINE

## 2021-12-20 RX ORDER — CYCLOBENZAPRINE HCL 5 MG
5 TABLET ORAL
Qty: 30 TABLET | Refills: 0 | Status: SHIPPED | OUTPATIENT
Start: 2021-12-20 | End: 2022-01-07

## 2021-12-20 RX ORDER — KETOROLAC TROMETHAMINE 30 MG/ML
60 INJECTION, SOLUTION INTRAMUSCULAR; INTRAVENOUS ONCE
Status: COMPLETED | OUTPATIENT
Start: 2021-12-20 | End: 2021-12-20

## 2021-12-20 RX ORDER — CELECOXIB 200 MG/1
200 CAPSULE ORAL DAILY
Qty: 30 CAPSULE | Refills: 3 | Status: SHIPPED | OUTPATIENT
Start: 2021-12-20 | End: 2022-02-25

## 2021-12-20 RX ADMIN — KETOROLAC TROMETHAMINE 60 MG: 30 INJECTION, SOLUTION INTRAMUSCULAR; INTRAVENOUS at 11:30

## 2021-12-20 NOTE — PROGRESS NOTES
1. Have you been to the ER, urgent care clinic since your last visit? Hospitalized since your last visit? No    2. Have you seen or consulted any other health care providers outside of the [address] [place] [address] since your last visit? Include any pap smears or colon screening.  No     Chief Complaint   Patient presents with    Follow-up     edmea; pain in lower back    Numbness

## 2021-12-20 NOTE — PROGRESS NOTES
IDENTIFYING INFORMATION:      Zuleyka Zapata , 55 y.o., female  [address].  [place],  [facility] [address]     Medical Record Number: [id number]    E and M CODING:  Established      Patient Active Problem List   Diagnosis Code    Type II diabetes mellitus, uncontrolled ([facility] [address] E11.65    Hyperlipidemia, mixed E78.2    Overactive bladder N32.81    Essential hypertension with goal blood pressure less than 140/90 I10    Uncontrolled type 2 diabetes mellitus with hyperglycemia, with long-term current use of insulin (HCC) E11.65, Z79.4    Type 2 diabetes mellitus with diabetic neuropathy ([facility]) E11.40    Type 2 diabetes mellitus with diabetic neuropathy, without long-term current use of insulin ([facility]) E11.40    Severe obesity (BMI 35.0-39. 9) with comorbidity ([facility] [address] E66.01    Arteriosclerosis of coronary artery I25.10    Degeneration of intervertebral disc of lumbar region M51.36    Depression F32. A    Idiopathic osteoarthritis M19.90    Lumbar scoliosis M41.9    Nontraumatic incomplete tear of left rotator cuff M75.112           CHIEF COMPLAINT:   Chief Complaint   Patient presents with    Follow-up     edmea; pain in lower back    Numbness         HISTORY OF PRESENT ILLNESS:    Renetta Mas is a 55 y.o. female . she comes in for Follow up and says she is in pain. Last week or so she has had pain right lower back and down into right leg, about the rey and says it goes across the left side. No changes in bowel or bladder habits. Edema is better since last visit. % but has improved. Paini is constant, sitting, walking, standing or lying. Been on about a week. NO injries per se.       PAST MEDICAL HISTORY:     Past Medical History:   Diagnosis Date    CAD (coronary artery disease)     Dr Markus Gordon Diabetes [facility])     Hypercholesterolemia     Hypertension        MEDICATIONS:     Current Outpatient Medications on File Prior to Visit   Medication Sig Dispense Refill    oxybutynin chloride XL (DITROPAN XL) 5 mg CR tablet TAKE 1 TABLET BY MOUTH EVERY DAY 90 Tablet 1    empagliflozin (Jardiance) 10 mg tablet Take 1 Tablet by mouth daily. 30 Tablet 3    dulaglutide (Trulicity) 1.5 KQ/0.9 mL sub-q pen 0.5 mL by SubCUTAneous route every seven (7) days. Stop 0.75 mg 12 Each 3    metFORMIN (GLUCOPHAGE) 1,000 mg tablet Take 1 Tablet by mouth two (2) times daily (with meals). Stop Glipizide 60 Tablet 5    mirabegron ER (Myrbetriq) 25 mg ER tablet Take 25 mg by mouth daily.  Blood-Glucose Meter (Accu-Chek Lorie Plus Meter) misc TEST BLOOD GLUCOSE TWICE DAILY Dx Code:E 11. 1 Each 0    glucose blood VI test strips (Accu-Chek Lorie Plus test strp) strip TEST BLOOD GLUCOSE TWICE DAILY Dx Code:E 11. 100 Strip 6    lancets (Accu-Chek Softclix Lancets) misc TEST BLOOD GLUCOSE TWICE DAILY Dx Code:E 11. 100 Each 6    aspirin delayed-release 81 mg tablet Take 81 mg by mouth daily.  atorvastatin (LIPITOR) 40 mg tablet Take 40 mg by mouth daily. No current facility-administered medications on file prior to visit. ALLERGIES:    No Known Allergies      SOCIAL HISTORY:     Social History     Socioeconomic History    Marital status: UNKNOWN   Tobacco Use    Smoking status: Former Smoker     Quit date:      Years since quittin.9    Smokeless tobacco: Never Used   Vaping Use    Vaping Use: Never used   Substance and Sexual Activity    Alcohol use:  Yes     Alcohol/week: 0.0 standard drinks    Drug use: Never         SURGICAL HISTORY:    Past Surgical History:   Procedure Laterality Date    HX COLONOSCOPY      needs soon, incomplete    HX CORONARY STENT PLACEMENT      x 4    HX ROTATOR CUFF REPAIR Left 2020        FAMILY HISTORY:    Family History   Problem Relation Age of Onset    Heart Disease Mother     Hypertension Father     Emphysema Father     Cancer Maternal Aunt         breast         REVIEW OF SYSTEMS:    I personally collected this information from all available source present (patient/others in room and records available) -JLEWISDO  Review of Systems   Constitutional: Negative for chills, diaphoresis and fever. HENT: Negative for congestion, sinus pain, sore throat and tinnitus. Eyes: Negative for blurred vision, double vision, photophobia and pain. Respiratory: Negative for cough, sputum production and shortness of breath. Cardiovascular: Positive for leg swelling (\"better\" but not best. ). Negative for chest pain, palpitations, orthopnea and PND. Gastrointestinal: Negative for abdominal pain, constipation, diarrhea, nausea and vomiting. Genitourinary: Negative for dysuria, frequency and urgency. Musculoskeletal: Positive for back pain, joint pain and myalgias. Negative for falls and neck pain. Skin: Negative for rash. Neurological: Negative for dizziness, tingling, sensory change, focal weakness and headaches. PHYSICAL EXAMINATION:    Vital Signs:    Visit Vitals  BP (!) 147/75 (BP 1 Location: Left upper arm, BP Patient Position: Sitting, BP Cuff Size: Large adult)   Pulse 61   Temp 97.3 °F (36.3 °C) (Temporal)   Ht 5' 11\" (1.803 m)   Wt 297 lb (134.7 kg)   SpO2 98%   BMI 41.42 kg/m²         Wt Readings from Last 3 Encounters:   12/20/21 297 lb (134.7 kg)   11/16/21 302 lb (137 kg)   10/29/21 304 lb (137.9 kg)     BP Readings from Last 3 Encounters:   12/20/21 (!) 147/75   11/16/21 (!) 142/71   10/29/21 (!) 123/56         Physical Exam  Nursing note reviewed. Constitutional:       General: She is not in acute distress. Appearance: Normal appearance. She is not ill-appearing or toxic-appearing. HENT:      Nose: No congestion or rhinorrhea. Mouth/Throat:      Pharynx: No oropharyngeal exudate or posterior oropharyngeal erythema. Eyes:      General: No scleral icterus. Extraocular Movements: Extraocular movements intact. Conjunctiva/sclera: Conjunctivae normal.      Pupils: Pupils are equal, round, and reactive to light.    Neck: Vascular: No carotid bruit. Cardiovascular:      Rate and Rhythm: Normal rate and regular rhythm. Heart sounds: No murmur heard. No friction rub. No gallop. Pulmonary:      Effort: Pulmonary effort is normal.      Breath sounds: Normal breath sounds. No wheezing, rhonchi or rales. Abdominal:      General: Bowel sounds are normal. There is no distension. Palpations: Abdomen is soft. There is no mass. Tenderness: There is no abdominal tenderness. Musculoskeletal:         General: No swelling, tenderness or deformity. Cervical back: No rigidity. No muscular tenderness. Right lower leg: No edema. Left lower leg: No edema. Lymphadenopathy:      Cervical: No cervical adenopathy. Skin:     Capillary Refill: Capillary refill takes less than 2 seconds. Coloration: Skin is not jaundiced. Findings: No erythema or rash. Neurological:      General: No focal deficit present. Mental Status: She is alert and oriented to person, place, and time. Mental status is at baseline. Cranial Nerves: No cranial nerve deficit. Sensory: No sensory deficit. Coordination: Coordination normal.      Gait: Gait normal.   Psychiatric:         Mood and Affect: Mood normal.         Behavior: Behavior normal.         Thought Content: Thought content normal.         Judgment: Judgment normal.           ASSESSMENT/PLAN:       For sciatica she is a good candidate for NSAID injection (ketorolac 60 mg IM)   Contineu small dose of muscle relxant   IF NOT better or improving in 3 days may do predniosne taper (DOenst check sugar but last A1c under 7)    Did try celecoxib-2 today and tomorrow (400 mg) then one daily thereafter.  May need specialist care.  Follow up and treat as indicated.      ICD-10-CM ICD-9-CM    1. Sciatica of right side  M54.31 724.3 celecoxib (CELEBREX) 200 mg capsule      cyclobenzaprine (FLEXERIL) 5 mg tablet      ketorolac tromethamine (TORADOL) 60 mg/2 mL injection 60 mg   2. Type 2 diabetes mellitus with diabetic neuropathy, without long-term current use of insulin (HCC)  E11.40 250.60      357.2    3. Leg edema  R60.0 782.3    4. Essential hypertension with goal blood pressure less than 140/90  I10 401.9    5. Arteriosclerosis of coronary artery  I25.10 414.00      Discussion (regarding today's visit with Manueldominick Giraldo);  Tobi Donovan gave the patient a review of medications, treatment, testing such as labs, imagine, referrals and when to call regarding results and appointments.  Reminded patient to keep any and all appointments with specialists, labs, imaging.  Reminded patient to make sure we get copies of any specialists care, labs and imaging.  Reminded patient to call of come by the office if there are any concerns, questions , comments or problems.  The patient verbalized understanding of the care plan and all questions were answered to the patient's satisfaction prior to leaving the office.  Failure to comply with recommended testing could result in abnormal health consequences.  The patient was instructed to have yearly routine health maintenance including but not limited to age appropriate vaccines, testing, screening exams via the AVS.      The patient actively participated in medical decision making. This date I spent  14 minutes reviewing chart, previous notes, tests and interviewing and examining patients answering questions providing follow up as well as ordering tests. FOLLOW UP:     Patient knows to keep any and all future visits scheduled unless told otherwise. Patient knows to call, come back if any concerns, questions, comments or problems arise. Signed By: Neda Robins DO     December 20, 2021     TIME: 6: 07 pm    This visit was reviewed and signed electronically. It was been completed with voice recognition software and hand typing. It may have syntax and spelling errors despite editing.

## 2021-12-20 NOTE — PATIENT INSTRUCTIONS
General Health and concerns:  HEART HEALTHY DIET:  A heart healthy diet is one that is low in cholesterol (less than 300 mg daily), fat (less than 80 g daily) . You should also minimize carbohydrates / sugars (less amounts of breads, pastas, potato and potato products and sugary foods/snacks, cookies, cakes, etc) . Try to eat whole wheat/multigrain breads and pastas and eat more vegetables. Cook with olive oil (or no oil) and grill, bake, broil or boil foods. Less red meat and more chicken , fish and lean cuts of beef (limited). 9920-7377 calories per day is sufficient 2677-3844 is acceptable for weight loss. EXERCISE:  You should do exercise 3-5 days per week (minimum) to include increasing your heart rate for 30 to 45 minutes. At least a pace of a brisk walk should do that. This build up your heart and lung endurance and muscles and helps many function of the body. OTHER:    IF your condition(s) do not improve, get worse and/or if any concerns arise, please call or come by the office. Routine Health maintenance: You need to get a yearly follow up/physical exam to review, discuss age and gender appropriate exams, labs, vaccines and screening tests. This includes cardiovascular health risk, cancer screens and other juan related topics. Medications-Take all medications as directed. Please do not stop unless you talk to your doctor or health care provider first. Report any problems immediately. PLEASE CHECK THE LIST FROM TODAY's VISIT and make SURE IT IS ACCURATE-Please bring any issues to our concern IMMEDIATELY!! Referrals: if you have been given a referral, please call the office if you do not hear from provider in one week. You may make the appointment yourself. Please keep all appointments with specialists and ask them to send their notes, thoughts, recommendations to us , as your PCP.     KEEP all upcoming appointments with our office UNLESS otherwise and specifically told not to. CHECK your diagnosis/problem list for today and that orders and prescriptions are what we discussed as well as MAKE sure all information is accurate and has been discussed to your satisfaction. PLEASE make sure all your questions have been answered and feel free to call or come back should any concerns arise. Imaging/Labs:  Be sure to get these images in a timely manner. IF your test must be scheduled, let us know if you need help getting this done and if you do not hear from that provider in a week , call us or them. BE SURE to call the office if you do not hear regarding the results in one week after the test is performed Image or lab). It is our intention to inform you of the results ALWAYS, even if normal you should get a notification (Call, portal message). PLEASE crhis if you do not get the results. PLEASE follow all recommendations and call/come in /ask questions if you do not understand of if problems develop after or in between visits. Failure to comply with recommended health care advise could result in serious health consequences. Thank you for choosing our practice and please let us know how we can help you feel better and stay well!

## 2022-01-06 DIAGNOSIS — M54.31 SCIATICA OF RIGHT SIDE: ICD-10-CM

## 2022-01-07 RX ORDER — CYCLOBENZAPRINE HCL 5 MG
TABLET ORAL
Qty: 30 TABLET | Refills: 0 | Status: SHIPPED | OUTPATIENT
Start: 2022-01-07 | End: 2022-02-15

## 2022-01-07 RX ORDER — ATORVASTATIN CALCIUM 40 MG/1
TABLET, FILM COATED ORAL
Qty: 30 TABLET | Refills: 2 | Status: SHIPPED | OUTPATIENT
Start: 2022-01-07

## 2022-01-12 ENCOUNTER — TELEPHONE (OUTPATIENT)
Dept: FAMILY MEDICINE CLINIC | Age: 57
End: 2022-01-12

## 2022-01-12 DIAGNOSIS — J06.9 UPPER RESPIRATORY TRACT INFECTION, UNSPECIFIED TYPE: Primary | ICD-10-CM

## 2022-01-12 LAB — SARS-COV-2, NAA: POSITIVE

## 2022-01-12 RX ORDER — AZITHROMYCIN 250 MG/1
TABLET, FILM COATED ORAL
Qty: 6 TABLET | Refills: 0 | Status: SHIPPED | OUTPATIENT
Start: 2022-01-12 | End: 2022-01-17

## 2022-01-12 RX ORDER — METHYLPREDNISOLONE 4 MG/1
TABLET ORAL
Qty: 1 DOSE PACK | Refills: 0 | Status: SHIPPED | OUTPATIENT
Start: 2022-01-12 | End: 2022-02-14 | Stop reason: ALTCHOICE

## 2022-01-12 NOTE — TELEPHONE ENCOUNTER
Identifying Data:  55 y.o. , Aj Harrington , female     HISTORICAL DATA (REVIEWED TODAY):  ALLERGIES-    No Known Allergies    MEDICATION AS OF LAST RECONCILIATION (NOT INCLUDING CHANGES MADE TODAY):    Current Outpatient Medications on File Prior to Visit   Medication Sig Dispense Refill    cyclobenzaprine (FLEXERIL) 5 mg tablet TAKE 1 TABLET BY MOUTH THREE TIMES DAILY AS NEEDED FOR MUSCLE SPASMS 30 Tablet 0    atorvastatin (LIPITOR) 40 mg tablet TAKE 1 TABLET BY MOUTH EVERY DAY 30 Tablet 2    celecoxib (CELEBREX) 200 mg capsule Take 1 Capsule by mouth daily for 90 days. 30 Capsule 3    oxybutynin chloride XL (DITROPAN XL) 5 mg CR tablet TAKE 1 TABLET BY MOUTH EVERY DAY 90 Tablet 1    empagliflozin (Jardiance) 10 mg tablet Take 1 Tablet by mouth daily. 30 Tablet 3    dulaglutide (Trulicity) 1.5 DF/7.2 mL sub-q pen 0.5 mL by SubCUTAneous route every seven (7) days. Stop 0.75 mg 12 Each 3    metFORMIN (GLUCOPHAGE) 1,000 mg tablet Take 1 Tablet by mouth two (2) times daily (with meals). Stop Glipizide 60 Tablet 5    mirabegron ER (Myrbetriq) 25 mg ER tablet Take 25 mg by mouth daily.  Blood-Glucose Meter (Accu-Chek Lorie Plus Meter) misc TEST BLOOD GLUCOSE TWICE DAILY Dx Code:E 11.65 1 Each 0    glucose blood VI test strips (Accu-Chek Lorie Plus test strp) strip TEST BLOOD GLUCOSE TWICE DAILY Dx Code:E 11.65 100 Strip 6    lancets (Accu-Chek Softclix Lancets) misc TEST BLOOD GLUCOSE TWICE DAILY Dx Code:E 11.65 100 Each 6    aspirin delayed-release 81 mg tablet Take 81 mg by mouth daily. No current facility-administered medications on file prior to visit.        PAST MEDICAL HISTORY:    Patient Active Problem List   Diagnosis Code    Type II diabetes mellitus, uncontrolled (HCC) E11.65    Hyperlipidemia, mixed E78.2    Overactive bladder N32.81    Essential hypertension with goal blood pressure less than 140/90 I10    Uncontrolled type 2 diabetes mellitus with hyperglycemia, with long-term current use of insulin (HCC) E11.65, Z79.4    Type 2 diabetes mellitus with diabetic neuropathy (HCC) E11.40    Type 2 diabetes mellitus with diabetic neuropathy, without long-term current use of insulin (HCC) E11.40    Severe obesity (BMI 35.0-39. 9) with comorbidity ([facility] [address] E66.01    Arteriosclerosis of coronary artery I25.10    Degeneration of intervertebral disc of lumbar region M51.36    Depression F32. A    Idiopathic osteoarthritis M19.90    Lumbar scoliosis M41.9    Nontraumatic incomplete tear of left rotator cuff M75.112       ASSESSMENT AND PLAN:      ICD-10-CM ICD-9-CM    1.  Upper respiratory tract infection, unspecified type  J06.9 465.9 azithromycin (ZITHROMAX) 250 mg tablet      methylPREDNISolone (MEDROL DOSEPACK) 4 mg tablet             Geovanna Graham DO

## 2022-01-12 NOTE — TELEPHONE ENCOUNTER
Patient went to [facility] and was diagnosed with upper respiratory and was not given an antibiotic and she said she needs an antibiotic Patient is Covid Positive

## 2022-02-04 ENCOUNTER — TELEPHONE (OUTPATIENT)
Dept: FAMILY MEDICINE CLINIC | Age: 57
End: 2022-02-04

## 2022-02-04 NOTE — TELEPHONE ENCOUNTER
Patient needs a refill for Jardiance 10 mg tablet ,  Ditropan XL 5 mg CR tablet , Atorvastatin 40 mg tablet,Flexaril 50 mg tablet

## 2022-02-07 NOTE — TELEPHONE ENCOUNTER
Patient was trying to reach a facility closer to her [facility]. She stated that our office is too far.

## 2022-02-07 NOTE — TELEPHONE ENCOUNTER
----- Message from [employer] Ariel sent at 2022  2:32 PM EST -----  Subject: Appointment Request    Reason for Call: New Patient Request Appointment    QUESTIONS  Type of Appointment? Established Patient  Reason for appointment request? No appointments available during search  Additional Information for Provider? pt need's a call back to establish   apt.   ---------------------------------------------------------------------------  --------------  CALL BACK INFO  What is the best way for the office to contact you? OK to leave message on   voicemail  Preferred Call Back Phone Number? [phone]  ---------------------------------------------------------------------------  --------------  SCRIPT ANSWERS  Relationship to Patient? Self  Specialty Confirmation? Primary Care  Is this the first appointment to establish care for a ? No  Have you been diagnosed with, awaiting test results for, or told that you   are suspected of having COVID-19 (Coronavirus)? (If patient has tested   negative or was tested as a requirement for work, school, or travel and   not based on symptoms, answer no)? Yes  Did your symptoms begin within the past 14 days or was your positive test   result within the past 14 days? No  Within the past two weeks have you developed any of the following symptoms   (answer no if symptoms have been present longer than 2 weeks or began   more than 2 weeks ago)? Fever or Chills, Cough, Shortness of breath or   difficulty breathing, Loss of taste or smell, Sore throat, Nasal   congestion, Sneezing or runny nose, Fatigue or generalized body aches   (answer no if pain is specific to a body part e.g. back pain), Diarrhea,   Headache? No  Have you had close contact with someone with COVID-19 in the last 14 days? No  (Service Expert  click yes below to proceed with [employer] As Usual   Scheduling)?  Yes  Have you been diagnosed with, awaiting test results for, or told that you   are suspected of having COVID-19 (Coronavirus)? (If patient has tested   negative or was tested as a requirement for work, school, or travel and   not based on symptoms, answer no)? Yes  Did your symptoms begin within the past 14 days or was your positive test   result within the past 14 days? No  Within the past two weeks have you developed any of the following symptoms   (answer no if symptoms have been present longer than 2 weeks or began   more than 2 weeks ago)? Fever or Chills, Cough, Shortness of breath or   difficulty breathing, Loss of taste or smell, Sore throat, Nasal   congestion, Sneezing or runny nose, Fatigue or generalized body aches   (answer no if pain is specific to a body part e.g. back pain), Diarrhea,   Headache? No  Have you had close contact with someone with COVID-19 in the last 14 days? No  (Service Expert  click yes below to proceed with [employer] As Usual   Scheduling)?  Yes

## 2022-02-14 ENCOUNTER — VIRTUAL VISIT (OUTPATIENT)
Dept: FAMILY MEDICINE CLINIC | Age: 57
End: 2022-02-14

## 2022-02-14 DIAGNOSIS — Z74.1 REQUIRES ASSISTANCE WITH ACTIVITIES OF DAILY LIVING (ADL): Primary | ICD-10-CM

## 2022-02-14 PROCEDURE — 99214 OFFICE O/P EST MOD 30 MIN: CPT | Performed by: NURSE PRACTITIONER

## 2022-02-14 PROCEDURE — G8427 DOCREV CUR MEDS BY ELIG CLIN: HCPCS | Performed by: NURSE PRACTITIONER

## 2022-02-14 PROCEDURE — G9717 DOC PT DX DEP/BP F/U NT REQ: HCPCS | Performed by: NURSE PRACTITIONER

## 2022-02-14 PROCEDURE — 3017F COLORECTAL CA SCREEN DOC REV: CPT | Performed by: NURSE PRACTITIONER

## 2022-02-14 PROCEDURE — G8756 NO BP MEASURE DOC: HCPCS | Performed by: NURSE PRACTITIONER

## 2022-02-14 PROCEDURE — G8417 CALC BMI ABV UP PARAM F/U: HCPCS | Performed by: NURSE PRACTITIONER

## 2022-02-14 RX ORDER — MELOXICAM 15 MG/1
1 TABLET ORAL AS NEEDED
COMMUNITY
Start: 2022-01-05

## 2022-02-14 NOTE — PROGRESS NOTES
Micah Murillo (: 1965) is a 55 y.o. female, established patient, here for evaluation of the following chief complaint(s):   Follow-up       ASSESSMENT/PLAN:  Below is the assessment and plan developed based on review of pertinent history, labs, studies, and medications. 1. Requires assistance with activities of daily living (ADL)      Return if symptoms worsen or fail to improve. 1. Requires assistance with activities of daily living (ADL)    Patient was advised to talk to the [employer] about the required paperwork she needs filled out and when she has obtained she can bring it by the office to be completed by me. Is interested in switching to another primary care office so that she can be seen by an MD or DO for her medical care. She stated she got a list of primary care practices and that her in insurance is helping her find another provider. SUBJECTIVE/OBJECTIVE:    HPI     Patient presented with concerns about her ability to live by herself and handle her activities of daily living due to neuropathy from her type II DM and pain from arthritis in multiple joints. Patient is new to me. Patient stated that her relative is willing to act as her caretaker. The patient lives in [address] [place] [address] and apparently needs a letter from a medical provider to justify another person living in the patient's apartment which is usually prohibited. She has been under the care of orthopedic surgeon Dr. Shantel Santos who is been ordering her gabapentin for her neuropathy symptoms. The gabapentin is somewhat helpful for her symptoms. She was advised to contact Ortho office if she needs refills. Patient said that her feet are always cold and she also has numbness in both of her hands. Review of her record indicated that last hemoglobin A1c was in controlled range below 7 which is quite an improvement over recent past when it was 11. She is currently taking Jardiance, metformin, and Trulicity.   She has been under the care of endocrinologist Dr. Wolf Rawls. Review of Systems   Constitutional: Negative for chills and fever. HENT: Negative for congestion, ear pain, postnasal drip, sinus pressure and sinus pain. Respiratory: Negative for cough, shortness of breath and wheezing. Cardiovascular: Negative for chest pain, palpitations and leg swelling. Gastrointestinal: Negative for abdominal pain, constipation, diarrhea, nausea and vomiting. Genitourinary: Negative for dysuria and frequency. Musculoskeletal: Positive for back pain. Negative for arthralgias and myalgias. Skin: Negative for rash. Neurological: Positive for numbness. Negative for dizziness, weakness, light-headedness and headaches. Both hands   Psychiatric/Behavioral: Negative for dysphoric mood and sleep disturbance. The patient is not nervous/anxious.          No data recorded     Physical Exam    [INSTRUCTIONS:  \"[x]\" Indicates a positive item  \"[]\" Indicates a negative item  -- DELETE ALL ITEMS NOT EXAMINED]    Constitutional: [x] Appears well-developed and well-nourished [x] No apparent distress      [] Abnormal -     Mental status: [x] Alert and awake  [x] Oriented to person/place/time [x] Able to follow commands    [] Abnormal -     Eyes:   EOM    [x]  Normal    [] Abnormal -   Sclera  [x]  Normal    [] Abnormal -          Discharge [x]  None visible   [] Abnormal -     HENT: [x] Normocephalic, atraumatic  [] Abnormal -   [] Mouth/Throat: Mucous membranes are moist    External Ears [x] Normal  [] Abnormal -    Neck: [x] No visualized mass [] Abnormal -     Pulmonary/Chest: [x] Respiratory effort normal   [x] No visualized signs of difficulty breathing or respiratory distress        [] Abnormal -      Musculoskeletal:   [] Normal gait with no signs of ataxia         [x] Normal range of motion of neck        [] Abnormal -     Neurological:        [x] No Facial Asymmetry (Cranial nerve 7 motor function) (limited exam due to video visit)          [x] No gaze palsy        [] Abnormal -          Skin:        [x] No significant exanthematous lesions or discoloration noted on facial skin         [] Abnormal -            Psychiatric:       [x] Normal Affect [] Abnormal -        [x] No Hallucinations        On this date 02/14/2022 I have spent 35 minutes reviewing previous notes, test results and face to face (virtual) with the patient discussing the diagnosis and importance of compliance with the treatment plan as well as documenting on the day of the visit. Billyarielguillermo Be was evaluated through a synchronous (real-time) audio-video encounter. The patient (or guardian if applicable) is aware that this is a billable service, which includes applicable co-pays. Verbal consent to proceed has been obtained. The visit was conducted pursuant to the emergency declaration under the [address] [place] [address], [address] [place] [address] authority and the [employer] General Act. Patient identification was verified, and a caregiver was present when appropriate. The patient was located at home in a state where the provider was licensed to provide care. An electronic signature was used to authenticate this note.   -- Dinorah Avendaño NP

## 2022-02-15 RX ORDER — ASPIRIN 81 MG/1
81 TABLET ORAL DAILY
COMMUNITY

## 2022-02-15 RX ORDER — GABAPENTIN 300 MG/1
300 CAPSULE ORAL 3 TIMES DAILY
COMMUNITY
Start: 2021-12-10

## 2022-02-17 PROBLEM — N39.3 STRESS INCONTINENCE OF URINE: Status: ACTIVE | Noted: 2022-02-17

## 2022-02-17 PROBLEM — F41.9 ANXIETY: Status: ACTIVE | Noted: 2022-02-17

## 2022-02-17 PROBLEM — N81.4 UTEROVAGINAL PROLAPSE: Status: ACTIVE | Noted: 2022-02-17

## 2022-02-17 PROBLEM — H26.9 CATARACT: Status: ACTIVE | Noted: 2022-02-17

## 2022-02-17 PROBLEM — F33.1 MODERATE RECURRENT MAJOR DEPRESSION (HCC): Status: ACTIVE | Noted: 2022-02-17

## 2022-02-25 ENCOUNTER — OFFICE VISIT (OUTPATIENT)
Dept: ENDOCRINOLOGY | Age: 57
End: 2022-02-25
Payer: MEDICARE

## 2022-02-25 VITALS
TEMPERATURE: 97.2 F | BODY MASS INDEX: 40.04 KG/M2 | HEIGHT: 71 IN | HEART RATE: 52 BPM | WEIGHT: 286 LBS | RESPIRATION RATE: 20 BRPM | DIASTOLIC BLOOD PRESSURE: 69 MMHG | SYSTOLIC BLOOD PRESSURE: 136 MMHG | OXYGEN SATURATION: 98 %

## 2022-02-25 DIAGNOSIS — E78.2 HYPERLIPIDEMIA, MIXED: ICD-10-CM

## 2022-02-25 DIAGNOSIS — E11.40 TYPE 2 DIABETES MELLITUS WITH DIABETIC NEUROPATHY, WITHOUT LONG-TERM CURRENT USE OF INSULIN (HCC): Primary | ICD-10-CM

## 2022-02-25 DIAGNOSIS — L97.922 ULCER OF LEFT LOWER EXTREMITY WITH FAT LAYER EXPOSED (HCC): ICD-10-CM

## 2022-02-25 LAB — HBA1C MFR BLD HPLC: 6.4 %

## 2022-02-25 PROCEDURE — G8427 DOCREV CUR MEDS BY ELIG CLIN: HCPCS | Performed by: INTERNAL MEDICINE

## 2022-02-25 PROCEDURE — 99214 OFFICE O/P EST MOD 30 MIN: CPT | Performed by: INTERNAL MEDICINE

## 2022-02-25 PROCEDURE — 2022F DILAT RTA XM EVC RTNOPTHY: CPT | Performed by: INTERNAL MEDICINE

## 2022-02-25 PROCEDURE — G8754 DIAS BP LESS 90: HCPCS | Performed by: INTERNAL MEDICINE

## 2022-02-25 PROCEDURE — G9717 DOC PT DX DEP/BP F/U NT REQ: HCPCS | Performed by: INTERNAL MEDICINE

## 2022-02-25 PROCEDURE — G8752 SYS BP LESS 140: HCPCS | Performed by: INTERNAL MEDICINE

## 2022-02-25 PROCEDURE — G8417 CALC BMI ABV UP PARAM F/U: HCPCS | Performed by: INTERNAL MEDICINE

## 2022-02-25 PROCEDURE — 3044F HG A1C LEVEL LT 7.0%: CPT | Performed by: INTERNAL MEDICINE

## 2022-02-25 PROCEDURE — 3017F COLORECTAL CA SCREEN DOC REV: CPT | Performed by: INTERNAL MEDICINE

## 2022-02-25 PROCEDURE — 83036 HEMOGLOBIN GLYCOSYLATED A1C: CPT | Performed by: INTERNAL MEDICINE

## 2022-02-25 RX ORDER — CEPHALEXIN 500 MG/1
500 CAPSULE ORAL 3 TIMES DAILY
Qty: 15 CAPSULE | Refills: 3 | Status: SHIPPED | OUTPATIENT
Start: 2022-02-25 | End: 2022-08-08 | Stop reason: ALTCHOICE

## 2022-02-25 RX ORDER — DULAGLUTIDE 3 MG/.5ML
3 INJECTION, SOLUTION SUBCUTANEOUS
Qty: 12 EACH | Refills: 3 | Status: SHIPPED | OUTPATIENT
Start: 2022-02-25

## 2022-02-25 NOTE — PROGRESS NOTES
Ni Martinez MD        Patient Information  Date:2/25/2022  Name : Shirlyn Primrose 55 y.o.     YOB: 1965         Referred by: None         Chief Complaint   Patient presents with    Diabetes           History of Present Illness: Shirlyn Primrose is a 55 y.o. female here for fu of  Type 2 Diabetes Mellitus. She was referred by None for evaluation and management of diabetes. No severe hypoglycemia  On metformin, tolerating Trulicity  No meter or logbook  Not able to walk due to arthralgia    She is compliant with the medications        She was diagnosed with type 2 diabetes 30 years ago,         Wt Readings from Last 3 Encounters:   02/25/22 286 lb (129.7 kg)   12/20/21 297 lb (134.7 kg)   11/16/21 302 lb (137 kg)       BP Readings from Last 3 Encounters:   02/25/22 136/69   12/20/21 (!) 147/75   11/16/21 (!) 142/71       Reviewed and updated this visit by clinical staff:  Tobacco  Allergies  Meds  Problems  Med Hx  Surg Hx  Fam Hx  Soc Hx            Review of Systems:  - Per HPI    Physical Examination:   Blood pressure 136/69, pulse (!) 52, temperature 97.2 °F (36.2 °C), temperature source Temporal, resp. rate 20, height 5' 11\" (1.803 m), weight 286 lb (129.7 kg), SpO2 98 %. Estimated body mass index is 39.89 kg/m² as calculated from the following:    Height as of this encounter: 5' 11\" (1.803 m). -   Weight as of this encounter: 286 lb (129.7 kg).   - General: pleasant, no distress, good eye contact  - HEENT: no exophthalmos, no periorbital edema, EOMI  - Neck: No thyromegaly  - CVS: S1-S2 regular  - RS: Normal respiratory effort  - Musculoskeletal: no tremors  - Neurological: alert and oriented  - Psychiatric: normal mood and affect  - Skin: Normal color    Diabetic foot exam ; July 2021    H/o partial or complete amputation of foot : No  H/o previous foot ulceration : No  H/o pre - ulcerative callus : No  H/o peripheral neuropathy and callus : No  H/o poor circulation  : No  Foot deformity : None    Data Reviewed:     Lab Results   Component Value Date/Time    Hemoglobin A1c 6.6 (H) 10/22/2021 12:00 AM    Hemoglobin A1c 9.7 (H) 07/22/2021 12:00 AM    Hemoglobin A1c 9.1 (H) 04/16/2021 10:36 AM    Hemoglobin A1c, External 7.0 03/04/2020 12:00 AM    Glucose 77 11/23/2021 01:17 PM    Glucose  10/19/2018 10:10 AM    Microalb/Creat ratio (ug/mg creat.) 9 04/16/2021 10:36 AM    LDL,Direct 58 04/16/2021 10:36 AM    LDL, calculated 52 02/07/2019 08:58 AM    Creatinine 0.96 11/23/2021 01:17 PM      Lab Results   Component Value Date/Time    Cholesterol, total 103 02/07/2019 08:58 AM    HDL Cholesterol 38 (L) 02/07/2019 08:58 AM    LDL,Direct 58 04/16/2021 10:36 AM    LDL, calculated 52 02/07/2019 08:58 AM    LDL-C, External 46 06/09/2020 12:00 AM    Triglyceride 64 02/07/2019 08:58 AM     Lab Results   Component Value Date/Time    GFR est non-AA 67 11/23/2021 01:17 PM    GFR est AA 77 11/23/2021 01:17 PM    Creatinine 0.96 11/23/2021 01:17 PM    BUN 11 11/23/2021 01:17 PM    Sodium 142 11/23/2021 01:17 PM    Potassium 4.5 11/23/2021 01:17 PM    Chloride 105 11/23/2021 01:17 PM    CO2 20 11/23/2021 01:17 PM         Assessment/Plan:     1. Type 2 diabetes mellitus with diabetic neuropathy, without long-term current use of insulin ([address]    2. Hyperlipidemia, mixed        1. Type 2 Diabetes Mellitus , neuropathy   Lab Results   Component Value Date/Time    Hemoglobin A1c 6.6 (H) 10/22/2021 12:00 AM    Hemoglobin A1c (POC) 5.4 10/19/2018 10:10 AM    Hemoglobin A1c, External 7.0 03/04/2020 12:00 AM   Controlled   continue Metformin,   Trulicity, Jardiance   She was on insulin in the past, weight loss is the delgado which was discussed    Advised to check glucose once daily  FLU annually ,Pneumovax ,aspirin daily,annual eye exam,microalbumin    2. HTN : Continue current therapy       3. Hyperlipidemia : Continue statin.     4.Obesity:Body mass index is 39.89 kg/m². Discussed about the importance of exercise and carbohydrate portion control. Weight loss discussed      Peripheral neuropathy: Stable      Left LE ulcer -infection, Keflex    Thank you for allowing me to participate in the care of this patient.     Shubham Lloyd MD    Patient verbalized understanding

## 2022-02-25 NOTE — PROGRESS NOTES
Anna Ibrahim is a 55 y.o. female here for   Chief Complaint   Patient presents with    Diabetes       1. Have you been to the ER, urgent care clinic since your last visit? Hospitalized since your last visit? -no    2. Have you seen or consulted any other health care providers outside of the [address] [place] [address] since your last visit?   Include any pap smears or colon screening.-[facility]

## 2022-03-04 ENCOUNTER — TELEPHONE (OUTPATIENT)
Dept: FAMILY MEDICINE CLINIC | Age: 57
End: 2022-03-04

## 2022-03-04 NOTE — TELEPHONE ENCOUNTER
Patient called and is looking for her paperwork for the [address] to be signed by Pérez Hui and when will it be faxed. Patient states this is holding her up on getting house. Paperwork was received on 2/14/2022. Please call patient and advise when this will be done.

## 2022-03-18 PROBLEM — F33.1 MODERATE RECURRENT MAJOR DEPRESSION (HCC): Status: ACTIVE | Noted: 2022-02-17

## 2022-03-18 PROBLEM — M19.90 IDIOPATHIC OSTEOARTHRITIS: Status: ACTIVE | Noted: 2021-07-16

## 2022-03-18 PROBLEM — F41.9 ANXIETY: Status: ACTIVE | Noted: 2022-02-17

## 2022-03-18 PROBLEM — N39.3 STRESS INCONTINENCE OF URINE: Status: ACTIVE | Noted: 2022-02-17

## 2022-03-18 PROBLEM — F32.A DEPRESSION: Status: ACTIVE | Noted: 2021-07-16

## 2022-03-19 PROBLEM — E66.01 SEVERE OBESITY (BMI 35.0-39.9) WITH COMORBIDITY (HCC): Status: ACTIVE | Noted: 2018-05-18

## 2022-03-19 PROBLEM — H26.9 CATARACT: Status: ACTIVE | Noted: 2022-02-17

## 2022-03-19 PROBLEM — M41.9 LUMBAR SCOLIOSIS: Status: ACTIVE | Noted: 2021-07-16

## 2022-03-19 PROBLEM — I25.10 ARTERIOSCLEROSIS OF CORONARY ARTERY: Status: ACTIVE | Noted: 2021-07-16

## 2022-03-19 PROBLEM — E11.40 TYPE 2 DIABETES MELLITUS WITH DIABETIC NEUROPATHY (HCC): Status: ACTIVE | Noted: 2018-01-12

## 2022-03-19 PROBLEM — M75.112 NONTRAUMATIC INCOMPLETE TEAR OF LEFT ROTATOR CUFF: Status: ACTIVE | Noted: 2021-07-16

## 2022-03-20 PROBLEM — N81.4 UTEROVAGINAL PROLAPSE: Status: ACTIVE | Noted: 2022-02-17

## 2022-03-20 PROBLEM — M51.36 DEGENERATION OF INTERVERTEBRAL DISC OF LUMBAR REGION: Status: ACTIVE | Noted: 2021-07-16

## 2022-03-20 PROBLEM — M51.369 DEGENERATION OF INTERVERTEBRAL DISC OF LUMBAR REGION: Status: ACTIVE | Noted: 2021-07-16

## 2022-06-29 LAB
ALBUMIN/CREAT UR: 7 MG/G CREAT (ref 0–29)
BUN SERPL-MCNC: 10 MG/DL (ref 6–24)
BUN/CREAT SERPL: 11 (ref 9–23)
CALCIUM SERPL-MCNC: 8.7 MG/DL (ref 8.7–10.2)
CHLORIDE SERPL-SCNC: 101 MMOL/L (ref 96–106)
CO2 SERPL-SCNC: 22 MMOL/L (ref 20–29)
CREAT SERPL-MCNC: 0.87 MG/DL (ref 0.57–1)
CREAT UR-MCNC: 91.7 MG/DL
EGFR: 79 ML/MIN/1.73
EST. AVERAGE GLUCOSE BLD GHB EST-MCNC: 126 MG/DL
GLUCOSE SERPL-MCNC: 103 MG/DL (ref 65–99)
HBA1C MFR BLD: 6 % (ref 4.8–5.6)
LDLC SERPL DIRECT ASSAY-MCNC: 48 MG/DL (ref 0–99)
MICROALBUMIN UR-MCNC: 6.6 UG/ML
POTASSIUM SERPL-SCNC: 4.2 MMOL/L (ref 3.5–5.2)
SODIUM SERPL-SCNC: 139 MMOL/L (ref 134–144)

## 2022-07-05 ENCOUNTER — OFFICE VISIT (OUTPATIENT)
Dept: ENDOCRINOLOGY | Age: 57
End: 2022-07-05
Payer: MEDICARE

## 2022-07-05 VITALS
BODY MASS INDEX: 38.99 KG/M2 | OXYGEN SATURATION: 95 % | DIASTOLIC BLOOD PRESSURE: 62 MMHG | HEIGHT: 71 IN | WEIGHT: 278.5 LBS | HEART RATE: 57 BPM | SYSTOLIC BLOOD PRESSURE: 133 MMHG | TEMPERATURE: 97.5 F | RESPIRATION RATE: 18 BRPM

## 2022-07-05 DIAGNOSIS — E11.40 TYPE 2 DIABETES MELLITUS WITH DIABETIC NEUROPATHY, WITHOUT LONG-TERM CURRENT USE OF INSULIN (HCC): Primary | ICD-10-CM

## 2022-07-05 DIAGNOSIS — E78.2 HYPERLIPIDEMIA, MIXED: ICD-10-CM

## 2022-07-05 DIAGNOSIS — I10 ESSENTIAL HYPERTENSION WITH GOAL BLOOD PRESSURE LESS THAN 140/90: ICD-10-CM

## 2022-07-05 PROCEDURE — G9717 DOC PT DX DEP/BP F/U NT REQ: HCPCS | Performed by: INTERNAL MEDICINE

## 2022-07-05 PROCEDURE — G8752 SYS BP LESS 140: HCPCS | Performed by: INTERNAL MEDICINE

## 2022-07-05 PROCEDURE — 3017F COLORECTAL CA SCREEN DOC REV: CPT | Performed by: INTERNAL MEDICINE

## 2022-07-05 PROCEDURE — G8427 DOCREV CUR MEDS BY ELIG CLIN: HCPCS | Performed by: INTERNAL MEDICINE

## 2022-07-05 PROCEDURE — 99214 OFFICE O/P EST MOD 30 MIN: CPT | Performed by: INTERNAL MEDICINE

## 2022-07-05 PROCEDURE — G8417 CALC BMI ABV UP PARAM F/U: HCPCS | Performed by: INTERNAL MEDICINE

## 2022-07-05 PROCEDURE — 3044F HG A1C LEVEL LT 7.0%: CPT | Performed by: INTERNAL MEDICINE

## 2022-07-05 PROCEDURE — G8754 DIAS BP LESS 90: HCPCS | Performed by: INTERNAL MEDICINE

## 2022-07-05 PROCEDURE — 2022F DILAT RTA XM EVC RTNOPTHY: CPT | Performed by: INTERNAL MEDICINE

## 2022-07-05 NOTE — PROGRESS NOTES
1. Have you been to the ER, urgent care clinic since your last visit? No Hospitalized since your last visit? No    2. Have you seen or consulted any other health care providers outside of the [address] [place] [address] since your last visit? Include any pap smears or colon screening.  No    Wt Readings from Last 3 Encounters:   07/05/22 278 lb 8 oz (126.3 kg)   02/25/22 286 lb (129.7 kg)   12/20/21 297 lb (134.7 kg)     Temp Readings from Last 3 Encounters:   07/05/22 97.5 °F (36.4 °C) (Temporal)   02/25/22 97.2 °F (36.2 °C) (Temporal)   12/20/21 97.3 °F (36.3 °C) (Temporal)     BP Readings from Last 3 Encounters:   07/05/22 133/62   02/25/22 136/69   12/20/21 (!) 147/75     Pulse Readings from Last 3 Encounters:   07/05/22 (!) 57   02/25/22 (!) 52   12/20/21 61     Lab Results   Component Value Date/Time    Hemoglobin A1c 6.0 (H) 06/28/2022 09:20 AM    Hemoglobin A1c (POC) 6.4 02/25/2022 11:02 AM    Hemoglobin A1c, External 7.0 03/04/2020 12:00 AM

## 2022-07-05 NOTE — PROGRESS NOTES
Uvaldo Reza MD        Patient Information  Date:7/5/2022  Name : Manuel Giraldo 55 y.o.     YOB: 1965         Referred by: Zander Fowler MD         Chief Complaint   Patient presents with    Diabetes           History of Present Illness: Manuel Giraldo is a 55 y.o. female here for fu of  Type 2 Diabetes Mellitus. She was referred by Zander Fowler MD for evaluation and management of diabetes. No severe hypoglycemia  On metformin, tolerating Trulicity  No meter or logbook  Not able to walk due to arthralgia    She is compliant with the medications        She was diagnosed with type 2 diabetes 30 years ago,         Wt Readings from Last 3 Encounters:   07/05/22 278 lb 8 oz (126.3 kg)   02/25/22 286 lb (129.7 kg)   12/20/21 297 lb (134.7 kg)       BP Readings from Last 3 Encounters:   07/05/22 133/62   02/25/22 136/69   12/20/21 (!) 147/75        Reviewed and updated this visit by clinical staff:  Tobacco  Allergies  Med Hx  Surg Hx  Fam Hx  Soc Hx          Review of Systems:  - Per HPI    Physical Examination:   Blood pressure 133/62, pulse (!) 57, temperature 97.5 °F (36.4 °C), temperature source Temporal, resp. rate 18, height 5' 11\" (1.803 m), weight 278 lb 8 oz (126.3 kg), SpO2 95 %. Estimated body mass index is 38.84 kg/m² as calculated from the following:    Height as of this encounter: 5' 11\" (1.803 m). -   Weight as of this encounter: 278 lb 8 oz (126.3 kg).   - General: pleasant, no distress, good eye contact  - HEENT: no exophthalmos, no periorbital edema, EOMI  - Neck: No thyromegaly  - CVS: S1-S2 regular  - RS: Normal respiratory effort  - Musculoskeletal: no tremors  - Neurological: alert and oriented  - Psychiatric: normal mood and affect  - Skin: Normal color    Diabetic foot exam ; July 2021    H/o partial or complete amputation of foot : No  H/o previous foot ulceration : No  H/o pre - ulcerative callus : No  H/o peripheral neuropathy and callus : No  H/o poor circulation  : No  Foot deformity : None    Data Reviewed:     Lab Results   Component Value Date/Time    Hemoglobin A1c 6.0 (H) 06/28/2022 09:20 AM    Hemoglobin A1c 6.6 (H) 10/22/2021 12:00 AM    Hemoglobin A1c 9.7 (H) 07/22/2021 12:00 AM    Hemoglobin A1c, External 7.0 03/04/2020 12:00 AM    Glucose 103 (H) 06/28/2022 09:20 AM    Glucose  10/19/2018 10:10 AM    Microalb/Creat ratio (ug/mg creat.) 7 06/28/2022 09:20 AM    LDL,Direct 48 06/28/2022 09:20 AM    LDL, calculated 52 02/07/2019 08:58 AM    Creatinine 0.87 06/28/2022 09:20 AM      Lab Results   Component Value Date/Time    Cholesterol, total 103 02/07/2019 08:58 AM    HDL Cholesterol 38 (L) 02/07/2019 08:58 AM    LDL,Direct 48 06/28/2022 09:20 AM    LDL, calculated 52 02/07/2019 08:58 AM    LDL-C, External 46 06/09/2020 12:00 AM    Triglyceride 64 02/07/2019 08:58 AM     Lab Results   Component Value Date/Time    GFR est non-AA 67 11/23/2021 01:17 PM    GFR est AA 77 11/23/2021 01:17 PM    Creatinine 0.87 06/28/2022 09:20 AM    BUN 10 06/28/2022 09:20 AM    Sodium 139 06/28/2022 09:20 AM    Potassium 4.2 06/28/2022 09:20 AM    Chloride 101 06/28/2022 09:20 AM    CO2 22 06/28/2022 09:20 AM         Assessment/Plan:     1. Type 2 diabetes mellitus with diabetic neuropathy, without long-term current use of insulin ([address]    2. Essential hypertension with goal blood pressure less than 140/90    3. Hyperlipidemia, mixed        1. Type 2 Diabetes Mellitus , neuropathy   Lab Results   Component Value Date/Time    Hemoglobin A1c 6.0 (H) 06/28/2022 09:20 AM    Hemoglobin A1c (POC) 6.4 02/25/2022 11:02 AM    Hemoglobin A1c, External 7.0 03/04/2020 12:00 AM   Controlled   continue Metformin,   Trulicity, Jardiance   She was on insulin in the past, weight loss is the delgado which was discussed    Advised to check glucose once daily  FLU annually ,Pneumovax ,aspirin daily,annual eye exam,microalbumin    2.   HTN : Continue current therapy       3. Hyperlipidemia : Continue statin. 4.Obesity:Body mass index is 38.84 kg/m². Discussed about the importance of exercise and carbohydrate portion control. Weight loss discussed      Peripheral neuropathy: Stable    CAD -       Thank you for allowing me to participate in the care of this patient.     Calvin Campo MD    Patient verbalized understanding

## 2022-07-05 NOTE — LETTER
7/5/2022    Patient: Manuel Giraldo   YOB: 1965   Date of Visit: 7/5/2022     Chase Tobin MD  [address] [place] [address]  Via Fax: [phone]    Dear Chase Tobin MD,      Thank you for referring Ms. Lara Zapata to [facility] for evaluation. My notes for this consultation are attached. If you have questions, please do not hesitate to call me. I look forward to following your patient along with you.       Sincerely,    Wilfrid Salcido MD

## 2022-08-02 ENCOUNTER — OFFICE VISIT (OUTPATIENT)
Dept: OBGYN CLINIC | Age: 57
End: 2022-08-02
Payer: MEDICARE

## 2022-08-02 VITALS
HEART RATE: 54 BPM | OXYGEN SATURATION: 99 % | DIASTOLIC BLOOD PRESSURE: 63 MMHG | TEMPERATURE: 97.3 F | SYSTOLIC BLOOD PRESSURE: 131 MMHG | WEIGHT: 267.8 LBS | BODY MASS INDEX: 37.35 KG/M2 | RESPIRATION RATE: 19 BRPM

## 2022-08-02 DIAGNOSIS — N76.0 BACTERIAL VAGINOSIS: ICD-10-CM

## 2022-08-02 DIAGNOSIS — N39.41 URGE INCONTINENCE OF URINE: Primary | ICD-10-CM

## 2022-08-02 DIAGNOSIS — N89.8 VAGINAL PRURITUS: ICD-10-CM

## 2022-08-02 DIAGNOSIS — B96.89 BACTERIAL VAGINOSIS: ICD-10-CM

## 2022-08-02 DIAGNOSIS — B37.31 CANDIDA VAGINITIS: ICD-10-CM

## 2022-08-02 PROCEDURE — G8754 DIAS BP LESS 90: HCPCS | Performed by: OBSTETRICS & GYNECOLOGY

## 2022-08-02 PROCEDURE — 3017F COLORECTAL CA SCREEN DOC REV: CPT | Performed by: OBSTETRICS & GYNECOLOGY

## 2022-08-02 PROCEDURE — G8417 CALC BMI ABV UP PARAM F/U: HCPCS | Performed by: OBSTETRICS & GYNECOLOGY

## 2022-08-02 PROCEDURE — G8752 SYS BP LESS 140: HCPCS | Performed by: OBSTETRICS & GYNECOLOGY

## 2022-08-02 PROCEDURE — 99213 OFFICE O/P EST LOW 20 MIN: CPT | Performed by: OBSTETRICS & GYNECOLOGY

## 2022-08-02 PROCEDURE — G9717 DOC PT DX DEP/BP F/U NT REQ: HCPCS | Performed by: OBSTETRICS & GYNECOLOGY

## 2022-08-02 PROCEDURE — G8427 DOCREV CUR MEDS BY ELIG CLIN: HCPCS | Performed by: OBSTETRICS & GYNECOLOGY

## 2022-08-02 RX ORDER — TERCONAZOLE 8 MG/G
1 CREAM VAGINAL
Qty: 20 G | Refills: 1 | Status: SHIPPED | OUTPATIENT
Start: 2022-08-02 | End: 2022-08-05

## 2022-08-02 NOTE — PROGRESS NOTES
Chief Complaint   Patient presents with    Other     Vaginal itching and c/o weak bladder     1. \"Have you been to the ER, urgent care clinic since your last visit? Hospitalized since your last visit? \" Yes Where: [facility]    2. \"Have you seen or consulted any other health care providers outside of the [address] [place] [address] since your last visit? \" No     3. For patients aged 39-70: Has the patient had a colonoscopy? Yes - no Care Gap present     If the patient is female:    4. For patients aged 41-77: Has the patient had a mammogram within the past 2 years? Yes - no Care Gap present    5. For patients aged 21-65: Has the patient had a pap smear? Yes - Care Gap present.  Rooming MA/LPN to request most recent results    Visit Vitals  /63 (BP 1 Location: Left upper arm, BP Patient Position: Sitting, BP Cuff Size: Adult)   Pulse (!) 54   Temp 97.3 °F (36.3 °C) (Temporal)   Resp 19   Wt 267 lb 12.8 oz (121.5 kg)   SpO2 99%   BMI 37.35 kg/m²

## 2022-08-02 NOTE — PROGRESS NOTES
HPI: Saira Lang is a 55 y.o. female , h/o endometrial ablation, who presents today for the following:  Chief Complaint   Patient presents with    Other     Vaginal itching and c/o weak bladder        Patient complains of a weak bladder. She has urge incontinence. She denies stress incontience. Ongoing x a few years. Burning with urination recently. +Vaginal pruritus x 1.5 months. Denies abnormal vaginal discharge. Denies vaginal odor. She is not sexually active (last active 3 yrs ago). Denies h/o STIs. H/o DM2, last HgbA1C 6.0%. She is on Oxybutynin and Myrebetriq for a few years but she doesn't feel a difference. Past Medical History:   Diagnosis Date    CAD (coronary artery disease)     Dr Jocelyn Gaston    Diabetes [facility])     Hypercholesterolemia     Hypertension      Allergies   Allergen Reactions    Cyclobenzaprine Hcl Other (comments)    Prednisone Other (comments)          Current Outpatient Medications:     terconazole (TERAZOL 3) 0.8 % vaginal cream, Insert 1 Applicator into vagina nightly for 3 days. , Disp: 20 g, Rfl: 1    dulaglutide (Trulicity) 3 WZ/9.0 mL pnij, 3 mg by SubCUTAneous route every seven (7) days. Stop 1.5 mg, Disp: 12 Each, Rfl: 3    aspirin delayed-release 81 mg tablet, Take 81 mg by mouth daily. , Disp: , Rfl:     atorvastatin (LIPITOR) 40 mg tablet, TAKE 1 TABLET BY MOUTH EVERY DAY, Disp: 30 Tablet, Rfl: 2    oxybutynin chloride XL (DITROPAN XL) 5 mg CR tablet, TAKE 1 TABLET BY MOUTH EVERY DAY, Disp: 90 Tablet, Rfl: 1    empagliflozin (Jardiance) 10 mg tablet, Take 1 Tablet by mouth daily. , Disp: 30 Tablet, Rfl: 3    metFORMIN (GLUCOPHAGE) 1,000 mg tablet, Take 1 Tablet by mouth two (2) times daily (with meals). Stop Glipizide, Disp: 60 Tablet, Rfl: 5    mirabegron ER (MYRBETRIQ) 25 mg ER tablet, Take 25 mg by mouth daily. , Disp: , Rfl:     cephALEXin (KEFLEX) 500 mg capsule, Take 1 Capsule by mouth three (3) times daily.  (Patient not taking: No sig reported), Disp: 15 Capsule, Rfl: 3    gabapentin (NEURONTIN) 300 mg capsule, Take 300 mg by mouth three (3) times daily. (Patient not taking: Reported on 8/2/2022), Disp: , Rfl:     meloxicam (MOBIC) 15 mg tablet, Take 1 Tablet by mouth as needed.  (Patient not taking: No sig reported), Disp: , Rfl:     Blood-Glucose Meter (Accu-Chek Lorie Plus Meter) misc, TEST BLOOD GLUCOSE TWICE DAILY Dx Code:E 11.65 (Patient not taking: Reported on 8/2/2022), Disp: 1 Each, Rfl: 0    glucose blood VI test strips (Accu-Chek Lorie Plus test strp) strip, TEST BLOOD GLUCOSE TWICE DAILY Dx Code:E 11.65 (Patient not taking: Reported on 8/2/2022), Disp: 100 Strip, Rfl: 6    lancets (Accu-Chek Softclix Lancets) misc, TEST BLOOD GLUCOSE TWICE DAILY Dx Code:E 11.65 (Patient not taking: Reported on 8/2/2022), Disp: 100 Each, Rfl: 6       Review of Systems:     OBJECTIVE:  /63 (BP 1 Location: Left upper arm, BP Patient Position: Sitting, BP Cuff Size: Adult)   Pulse (!) 54   Temp 97.3 °F (36.3 °C) (Temporal)   Resp 19   Wt 267 lb 12.8 oz (121.5 kg)   SpO2 99%   BMI 37.35 kg/m²      Constitutional  Appearance: well-nourished, well developed, alert, in no acute distress,overweight    HENT  Head and Face: appears normal    Neck  Inspection/Palpation: normal appearance      Chest  Respiratory Effort: normal      Gastrointestinal  Abdominal Examination: abdomen non-tender to palpation, no masses present    Genitourinary  External Genitalia: normal appearance for age, no discharge present, no tenderness present, inflammation noted- ertyhematous white patches on inferior mons and bilateral labia majora/minora with multiple superficial ulcers, no masses present; atrophy present  Vagina: normal vaginal vault without central or paravaginal defects, no discharge present, no inflammatory lesions present, no masses present; atrophic  Bladder: non-tender to palpation  Urethra: appears normal  Cervix: normal, no cervical motion tenderness or abnormal discharge, swab obtained  Uterus: normal size, shape and consistency  Adnexa: no adnexal tenderness present, no adnexal masses present  Perineum: perineum within normal limits, no evidence of trauma, no rashes or skin lesions present  Anus: anus within normal limits, no hemorrhoids present    Skin  General Inspection: no rash, no lesions identified    Neurologic/Psychiatric  Mental Status:  Orientation: grossly oriented to person, place and time  Mood and Affect: mood normal, affect appropriate          Assessment/plan:      ICD-10-CM ICD-9-CM    1. Urge incontinence of urine  N39.41 788.31 URINALYSIS W/ REFLEX CULTURE      REFERRAL TO UROLOGY      2. Vaginal pruritus  N89.8 698.1 NUSWAB VAGINITIS + HSV      terconazole (TERAZOL 3) 0.8 % vaginal cream         Pt states unable to leave urine sample so labslip given. Vaginal swab obtained. Yeast cream prescribed.

## 2022-08-05 LAB
A VAGINAE DNA VAG QL NAA+PROBE: ABNORMAL SCORE
BVAB2 DNA VAG QL NAA+PROBE: ABNORMAL SCORE
C ALBICANS DNA VAG QL NAA+PROBE: POSITIVE
C GLABRATA DNA VAG QL NAA+PROBE: NEGATIVE
C TRACH DNA VAG QL NAA+PROBE: NEGATIVE
HSV1 DNA SPEC QL NAA+PROBE: NEGATIVE
HSV2 DNA SPEC QL NAA+PROBE: NEGATIVE
MEGA1 DNA VAG QL NAA+PROBE: ABNORMAL SCORE
N GONORRHOEA DNA VAG QL NAA+PROBE: NEGATIVE
T VAGINALIS DNA VAG QL NAA+PROBE: NEGATIVE

## 2022-08-08 RX ORDER — BACLOFEN 10 MG/1
TABLET ORAL
COMMUNITY
Start: 2022-04-06

## 2022-08-08 RX ORDER — ISOSORBIDE MONONITRATE 30 MG/1
30 TABLET, EXTENDED RELEASE ORAL DAILY
COMMUNITY
Start: 2022-05-31

## 2022-08-08 RX ORDER — TRAMADOL HYDROCHLORIDE AND ACETAMINOPHEN 37.5; 325 MG/1; MG/1
TABLET ORAL
COMMUNITY
Start: 2022-07-07

## 2022-08-08 RX ORDER — OXYBUTYNIN CHLORIDE 10 MG/1
10 TABLET, EXTENDED RELEASE ORAL
COMMUNITY
Start: 2022-07-27

## 2022-08-11 RX ORDER — METRONIDAZOLE 500 MG/1
500 TABLET ORAL EVERY 12 HOURS
Qty: 14 TABLET | Refills: 1 | Status: SHIPPED | OUTPATIENT
Start: 2022-08-11 | End: 2022-08-18

## 2022-08-12 NOTE — PROGRESS NOTES
Pls let her know she did have yeast and the cream was already prescribed. Possible BV, i'll send in Metronidazole.

## 2022-08-17 NOTE — PROGRESS NOTES
Spoke with patient informed of yeast and possible BV and medication sent to pharmacy. Patient verbalized understanding.

## 2022-09-27 ENCOUNTER — HOSPITAL ENCOUNTER (EMERGENCY)
Age: 57
Discharge: HOME | End: 2022-09-28
Attending: EMERGENCY MEDICINE
Payer: MEDICARE

## 2022-09-27 ENCOUNTER — APPOINTMENT (OUTPATIENT)
Dept: GENERAL RADIOLOGY | Age: 57
End: 2022-09-27
Attending: EMERGENCY MEDICINE
Payer: MEDICARE

## 2022-09-27 VITALS
RESPIRATION RATE: 16 BRPM | TEMPERATURE: 98.3 F | BODY MASS INDEX: 37.22 KG/M2 | OXYGEN SATURATION: 100 % | HEART RATE: 53 BPM | WEIGHT: 260 LBS | DIASTOLIC BLOOD PRESSURE: 84 MMHG | HEIGHT: 70 IN | SYSTOLIC BLOOD PRESSURE: 147 MMHG

## 2022-09-27 DIAGNOSIS — M54.50 LOW BACK PAIN WITHOUT SCIATICA, UNSPECIFIED BACK PAIN LATERALITY, UNSPECIFIED CHRONICITY: ICD-10-CM
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
## 2022-09-27 DIAGNOSIS — S83.91XA SPRAIN OF RIGHT KNEE, UNSPECIFIED LIGAMENT, INITIAL ENCOUNTER: ICD-10-CM

## 2022-09-27 PROCEDURE — 72100 X-RAY EXAM L-S SPINE 2/3 VWS: CPT

## 2022-09-27 PROCEDURE — 73562 X-RAY EXAM OF KNEE 3: CPT

## 2022-09-27 PROCEDURE — 74011250637 HC RX REV CODE- 250/637: Performed by: EMERGENCY MEDICINE

## 2022-09-27 PROCEDURE — 74011250636 HC RX REV CODE- 250/636: Performed by: EMERGENCY MEDICINE

## 2022-09-27 PROCEDURE — 96372 THER/PROPH/DIAG INJ SC/IM: CPT

## 2022-09-27 PROCEDURE — 99284 EMERGENCY DEPT VISIT MOD MDM: CPT

## 2022-09-27 RX ORDER — ONDANSETRON 4 MG/1
4 TABLET, ORALLY DISINTEGRATING ORAL
Status: DISCONTINUED | OUTPATIENT
Start: 2022-09-28 | End: 2022-09-27

## 2022-09-27 RX ORDER — KETOROLAC TROMETHAMINE 30 MG/ML
30 INJECTION, SOLUTION INTRAMUSCULAR; INTRAVENOUS ONCE
Status: COMPLETED | OUTPATIENT
Start: 2022-09-28 | End: 2022-09-27

## 2022-09-27 RX ORDER — ACETAMINOPHEN 500 MG
1000 TABLET ORAL ONCE
Status: COMPLETED | OUTPATIENT
Start: 2022-09-28 | End: 2022-09-27

## 2022-09-27 RX ORDER — OXYCODONE AND ACETAMINOPHEN 5; 325 MG/1; MG/1
1 TABLET ORAL
Status: DISCONTINUED | OUTPATIENT
Start: 2022-09-28 | End: 2022-09-27

## 2022-09-27 RX ORDER — DICLOFENAC SODIUM 10 MG/G
4 GEL TOPICAL 4 TIMES DAILY
Qty: 1 EACH | Refills: 0 | Status: SHIPPED | OUTPATIENT
Start: 2022-09-27 | End: 2022-10-02

## 2022-09-27 RX ADMIN — KETOROLAC TROMETHAMINE 30 MG: 30 INJECTION, SOLUTION INTRAMUSCULAR; INTRAVENOUS at 23:32

## 2022-09-27 RX ADMIN — ACETAMINOPHEN 1000 MG: 500 TABLET ORAL at 23:32

## 2022-09-27 NOTE — Clinical Note
[address] EMERGENCY DEPARTMENT  [address] [address]  [phone]    Work/School Note    Date: 9/27/2022    To Whom It May concern:    Yandy Floyd was seen and treated today in the emergency room by the following provider(s):  Attending Provider: Sebastian Burris MD.      Yandy Floyd is excused from work/school on 09/27/22 and 09/28/22. She is medically clear to return to work/school on 9/29/2022.        Sincerely,          Joaquin Nieto MD

## 2022-09-28 NOTE — ED PROVIDER NOTES
EMERGENCY DEPARTMENT HISTORY AND PHYSICAL EXAM      Date: 9/27/2022  Patient Name: Yandy Floyd    History of Presenting Illness     Chief Complaint   Patient presents with    Knee Pain     Back pain      Fall    Back Pain       History Provided By: Patient    HPI: Yandy Floyd, 55 y.o. female   presents to the ED with cc of ground-level fall. Patient states that she had a ground-level fall x2 this afternoon when her legs gave out. Patient states that she landed on her buttock twisting the right knee. No head injury. Pain has been constant in mild to moderate degree with movement aggravating the pain. No paresthesia or motor dysfunction. No fecal or urinary incontinence. No saddle numbness. No OTC treatment. PCP: Josey Hirsch MD    No current facility-administered medications on file prior to encounter. Current Outpatient Medications on File Prior to Encounter   Medication Sig Dispense Refill    oxybutynin chloride XL (DITROPAN XL) 10 mg CR tablet Take 10 mg by mouth nightly. dulaglutide (Trulicity) 3 GO/1.3 mL pnij 3 mg by SubCUTAneous route every seven (7) days. Stop 1.5 mg 12 Each 3    aspirin delayed-release 81 mg tablet Take 81 mg by mouth daily. atorvastatin (LIPITOR) 40 mg tablet TAKE 1 TABLET BY MOUTH EVERY DAY 30 Tablet 2    empagliflozin (Jardiance) 10 mg tablet Take 1 Tablet by mouth daily. 30 Tablet 3    metFORMIN (GLUCOPHAGE) 1,000 mg tablet Take 1 Tablet by mouth two (2) times daily (with meals). Stop Glipizide 60 Tablet 5    mirabegron ER (MYRBETRIQ) 25 mg ER tablet Take 25 mg by mouth daily. Blood-Glucose Meter (Accu-Chek Lorie Plus Meter) misc TEST BLOOD GLUCOSE TWICE DAILY Dx Code:E 11.65 1 Each 0    baclofen (LIORESAL) 10 mg tablet take 1 tablet by mouth three times a day if needed for STIFFNESS      isosorbide mononitrate ER (IMDUR) 30 mg tablet Take 30 mg by mouth in the morning.       traMADol-acetaminophen (ULTRACET) 37.5-325 mg per tablet take 1 tablet by mouth every 8 hours if needed for pain      gabapentin (NEURONTIN) 300 mg capsule Take 300 mg by mouth three (3) times daily. (Patient not taking: Reported on 2022)      meloxicam (MOBIC) 15 mg tablet Take 1 Tablet by mouth as needed. (Patient not taking: No sig reported)      glucose blood VI test strips (Accu-Chek Lorie Plus test strp) strip TEST BLOOD GLUCOSE TWICE DAILY Dx Code:E 11.65 (Patient not taking: Reported on 2022) 100 Strip 6    lancets (Accu-Chek Softclix Lancets) misc TEST BLOOD GLUCOSE TWICE DAILY Dx Code:E 11.65 (Patient not taking: Reported on 2022) 100 Each 6       Past History     Past Medical History:  Past Medical History:   Diagnosis Date    CAD (coronary artery disease)     Dr Bernice Vallecillo    Diabetes [facility])     Hypercholesterolemia     Hypertension        Past Surgical History:  Past Surgical History:   Procedure Laterality Date    HX COLONOSCOPY      needs soon, incomplete    HX CORONARY STENT PLACEMENT      x 4    HX ROTATOR CUFF REPAIR Left 2020       Family History:  Family History   Problem Relation Age of Onset    Heart Disease Mother     Hypertension Father     Emphysema Father     Cancer Maternal Aunt         breast       Social History:  Social History     Tobacco Use    Smoking status: Former     Types: Cigarettes     Quit date:      Years since quittin.7    Smokeless tobacco: Never   Vaping Use    Vaping Use: Never used   Substance Use Topics    Alcohol use: Yes     Comment: occasionally    Drug use: Never       Allergies: Allergies   Allergen Reactions    Cyclobenzaprine Hcl Other (comments)    Prednisone Other (comments)     Yeast infection         Review of Systems   Review of Systems   Constitutional:  Negative for fever. HENT:  Negative for facial swelling and nosebleeds. Eyes:  Negative for pain. Respiratory:  Negative for shortness of breath. Cardiovascular:  Negative for chest pain.    Gastrointestinal:  Negative for abdominal pain and vomiting. Genitourinary:  Negative for flank pain. Musculoskeletal:  Positive for back pain. Negative for neck pain. Skin:  Negative for wound. Neurological:  Negative for headaches. Psychiatric/Behavioral:  Negative for confusion. All other systems reviewed and are negative. Physical Exam   Physical Exam  Vitals and nursing note reviewed. Constitutional:       General: She is not in acute distress. Appearance: Normal appearance. She is well-developed. She is not ill-appearing or toxic-appearing. HENT:      Head: Normocephalic and atraumatic. Nose: No congestion. Mouth/Throat:      Mouth: Mucous membranes are moist.   Eyes:      Conjunctiva/sclera: Conjunctivae normal.   Cardiovascular:      Rate and Rhythm: Regular rhythm. Heart sounds: Normal heart sounds. Pulmonary:      Effort: Pulmonary effort is normal.      Breath sounds: Normal breath sounds. Abdominal:      General: Bowel sounds are normal.      Palpations: Abdomen is soft. Tenderness: There is no abdominal tenderness. Musculoskeletal:         General: No deformity. Cervical back: Neck supple. No rigidity or tenderness. Comments: Lumbar and thoracic midline nontender. Right knee without deformity or joint effusion. No laxity. Mild discomfort with range of motion. Pedal pulse palpable. Skin:     General: Skin is warm and dry. Neurological:      General: No focal deficit present. Mental Status: She is alert. Psychiatric:         Mood and Affect: Mood normal.       Diagnostic Study Results     Labs -   No results found for this or any previous visit (from the past 12 hour(s)). Radiologic Studies -   XR SPINE LUMB 2 OR 3 V   Final Result   No acute abnormality. Stable diffuse degenerative changes. XR KNEE RT 3 V   Final Result   No acute bony abnormality. Stable mild tricompartmental degenerative   changes. Trace joint effusion.             CT Results  (Last 48 hours) None          CXR Results  (Last 48 hours)      None              Medical Decision Making   I am the first provider for this patient. I reviewed the vital signs, available nursing notes, past medical history, past surgical history, family history and social history. Vital Signs-Reviewed the patient's vital signs. Patient Vitals for the past 12 hrs:   Temp Pulse Resp BP SpO2   09/27/22 2230 98.3 °F (36.8 °C) (!) 53 16 (!) 147/84 100 %       Records Reviewed:     Provider Notes (Medical Decision Making):       ED Course:   Initial assessment performed. The patients presenting problems have been discussed, and they are in agreement with the care plan formulated and outlined with them. I have encouraged them to ask questions as they arise throughout their visit. PROCEDURES      Disposition: Condition stable   DC- Adult Discharges: All of the diagnostic tests were reviewed and questions answered. Diagnosis, care plan and treatment options were discussed. understand instructions and will follow up as directed. The patients results have been reviewed with them. They have been counseled regarding their diagnosis. The patient verbally convey understanding and agreement of the signs, symptoms, diagnosis, treatment and prognosis and additionally agrees to follow up as recommended. They also agree with the care-plan and convey that all of their questions have been answered. I have also put together some discharge instructions for them that include: 1) educational information regarding their diagnosis, 2) how to care for their diagnosis at home, as well a 3) list of reasons why they would want to return to the ED prior to their follow-up appointment, should their condition change. PLAN:  1. Current Discharge Medication List        START taking these medications    Details   diclofenac (Voltaren) 1 % gel Apply 4 g to affected area four (4) times daily for 5 days.   Qty: 1 Each, Refills: 0  Start date: 9/27/2022, End date: 10/2/2022           2. Follow-up Information       Follow up With Specialties Details Why Contact Info    Follow up with your primary care physician  Schedule an appointment as soon as possible for a visit in 3 days As needed           Return to ED if worse     Diagnosis     Clinical Impression:   1. Fall, initial encounter    2. Low back pain without sciatica, unspecified back pain laterality, unspecified chronicity    3. Sprain of right knee, unspecified ligament, initial encounter        Please note that this dictation was completed with [employer], the computer voice recognition software. Quite often unanticipated grammatical, syntax, homophones, and other interpretive errors are inadvertently transcribed by the computer software. Please disregard these errors. Please excuse any errors that have escaped final proofreading. Thank you.

## 2022-10-02 NOTE — PROGRESS NOTES
HISTORY OF PRESENT ILLNESS    Emre nAn is a 55 y.o. female is a new patient is here with cc of urge incontinence. It is been going on for several years  She has a history of  T2DM,CAD,HTN    She is on Oxybutynin and Myrebetriq ? for a few years but she doesn't feel a difference. She denies stress incontience. She had burning with urination recently had Vaginal pruritus x 1.5 months  Patient denies fevers, chills, nausea, vomiting weight loss or bone pain. She is status post hysterectomy         Past Medical History:  PMHx (including negatives):  has a past medical history of CAD (coronary artery disease), Diabetes ([address] Hypercholesterolemia, and Hypertension. PSurgHx:  has a past surgical history that includes hx coronary stent placement; hx rotator cuff repair (Left, 11/06/2020); hx colonoscopy; and hx carpal tunnel release (2007). PSocHx:  reports that she quit smoking about 17 years ago. Her smoking use included cigarettes. She has never used smokeless tobacco. She reports current alcohol use. She reports that she does not use drugs. Home Medications    Medication Sig Start Date End Date Taking? Authorizing Provider   estradioL (ESTRACE) 0.01 % (0.1 mg/gram) vaginal cream Insert 1 g into vagina every Monday, Wednesday, Friday. 10/5/22  Yes Raman Nichols MD   ciprofloxacin HCl (CIPRO) 250 mg tablet Take 1 Tablet by mouth two (2) times a day. 10/5/22  Yes Raman Nichols MD   isosorbide mononitrate ER (IMDUR) 30 mg tablet Take 30 mg by mouth in the morning. 5/31/22  Yes Provider, Historical   oxybutynin chloride XL (DITROPAN XL) 10 mg CR tablet Take 10 mg by mouth nightly. 7/27/22  Yes Provider, Historical   dulaglutide (Trulicity) 3 TY/5.3 mL pnij 3 mg by SubCUTAneous route every seven (7) days. Stop 1.5 mg 2/25/22  Yes Lori Lynch MD   aspirin delayed-release 81 mg tablet Take 81 mg by mouth daily.    Yes Provider, Historical   atorvastatin (LIPITOR) 40 mg tablet TAKE 1 TABLET BY MOUTH EVERY DAY 1/7/22  Yes Andrew Pruitt,    empagliflozin (Jardiance) 10 mg tablet Take 1 Tablet by mouth daily. 11/16/21  Yes Andrew Pruitt DO   mirabegron ER (MYRBETRIQ) 25 mg ER tablet Take 25 mg by mouth daily. Yes Provider, Historical   baclofen (LIORESAL) 10 mg tablet take 1 tablet by mouth three times a day if needed for STIFFNESS  Patient not taking: Reported on 10/5/2022 4/6/22   Provider, Historical   traMADol-acetaminophen (ULTRACET) 37.5-325 mg per tablet take 1 tablet by mouth every 8 hours if needed for pain  Patient not taking: Reported on 10/5/2022 7/7/22   Provider, Historical   gabapentin (NEURONTIN) 300 mg capsule Take 300 mg by mouth three (3) times daily. Patient not taking: No sig reported 12/10/21   Provider, Historical   meloxicam (MOBIC) 15 mg tablet Take 1 Tablet by mouth as needed. Patient not taking: Reported on 10/5/2022 1/5/22   Provider, Historical   metFORMIN (GLUCOPHAGE) 1,000 mg tablet Take 1 Tablet by mouth two (2) times daily (with meals). Stop Glipizide  Patient not taking: Reported on 10/5/2022 7/29/21   Faby Marcos MD   Blood-Glucose Meter (Accu-Chek Lorie Plus Meter) misc TEST BLOOD GLUCOSE TWICE DAILY Dx Code:E 11.65  Patient not taking: Reported on 10/5/2022 9/28/20   Faby Marcos MD   glucose blood VI test strips (Accu-Chek Lorie Plus test strp) strip TEST BLOOD GLUCOSE TWICE DAILY Dx Code:E 11.65  Patient not taking: No sig reported 9/28/20   Faby Marcos MD   lancets (Accu-Chek Softclix Lancets) misc TEST BLOOD GLUCOSE TWICE DAILY Dx Code:E 11.65  Patient not taking: No sig reported 9/28/20   Faby Marcos MD        Chronic Conditions Addressed Today       1. Overactive bladder    2. Severe obesity (BMI 35.0-39. 9) with comorbidity ([address]    3.  Urge incontinence of urine - Primary     Relevant Medications     estradioL (ESTRACE) 0.01 % (0.1 mg/gram) vaginal cream     ciprofloxacin HCl (CIPRO) 250 mg tablet     Other Relevant Orders AMB POC URINALYSIS DIP STICK AUTO W/O MICRO (Completed)     AMB POC PVR, CHRISTIANO,POST-VOID RES,US,NON-IMAGING (Completed)     CULTURE, URINE    4. Uterovaginal prolapse    5. Urinary tract infection without hematuria     Relevant Medications     estradioL (ESTRACE) 0.01 % (0.1 mg/gram) vaginal cream     ciprofloxacin HCl (CIPRO) 250 mg tablet     Other Relevant Orders     AMB POC URINALYSIS DIP STICK AUTO W/O MICRO (Completed)     AMB POC PVR, CHRISTIANO,POST-VOID RES,US,NON-IMAGING (Completed)     CULTURE, URINE    6. Atrophic vaginitis     Relevant Medications     estradioL (ESTRACE) 0.01 % (0.1 mg/gram) vaginal cream     ciprofloxacin HCl (CIPRO) 250 mg tablet     Other Relevant Orders     AMB POC URINALYSIS DIP STICK AUTO W/O MICRO (Completed)     AMB POC PVR, CHRISTIANO,POST-VOID RES,US,NON-IMAGING (Completed)       Review of Systems   Genitourinary:  Positive for frequency. Musculoskeletal:  Positive for back pain, joint pain and neck pain. Neurological:         Unsteady gait   All other systems reviewed and are negative. Patient denies the symptoms of COVID-19 per routine screening guidelines. Physical Exam  Vitals and nursing note reviewed. Exam conducted with a chaperone present. Constitutional:       General: she is not in acute distress. Appearance: Normal appearance. HENT:      Head: Normocephalic and atraumatic. Eyes:      Pupils: Pupils are equal, round, and reactive to light. Cardiovascular:      Rate and Rhythm: Normal rate and regular rhythm. Pulses: Normal pulses. Pulmonary:      Effort: Pulmonary effort is normal.   Genitourinary:  Deferred  Musculoskeletal:         General: No swelling or deformity. Patient had to use walker to move around  Skin:     Coloration: Skin is not pale. Findings: No rash. Neurological:      General: No focal deficit present. Mental Status: He is alert.    Psychiatric:         Mood and Affect: Mood normal.         Behavior: Behavior normal.   ASSESSMENT and PLAN  Diagnoses and all orders for this visit:    1. Urge incontinence of urine  -     AMB POC URINALYSIS DIP STICK AUTO W/O MICRO  -     AMB POC PVR, CHRISTIANO,POST-VOID RES,US,NON-IMAGING  -     estradioL (ESTRACE) 0.01 % (0.1 mg/gram) vaginal cream; Insert 1 g into vagina every Monday, Wednesday, Friday. -     ciprofloxacin HCl (CIPRO) 250 mg tablet; Take 1 Tablet by mouth two (2) times a day. -     CULTURE, URINE    2. Urinary tract infection without hematuria, site unspecified  -     AMB POC URINALYSIS DIP STICK AUTO W/O MICRO  -     AMB POC PVR, CHRISTIANO,POST-VOID RES,US,NON-IMAGING  -     estradioL (ESTRACE) 0.01 % (0.1 mg/gram) vaginal cream; Insert 1 g into vagina every Monday, Wednesday, Friday. -     ciprofloxacin HCl (CIPRO) 250 mg tablet; Take 1 Tablet by mouth two (2) times a day. -     CULTURE, URINE    3. Atrophic vaginitis  -     AMB POC URINALYSIS DIP STICK AUTO W/O MICRO  -     AMB POC PVR, CHRISTIANO,POST-VOID RES,US,NON-IMAGING  -     estradioL (ESTRACE) 0.01 % (0.1 mg/gram) vaginal cream; Insert 1 g into vagina every Monday, Wednesday, Friday. -     ciprofloxacin HCl (CIPRO) 250 mg tablet; Take 1 Tablet by mouth two (2) times a day. 4. Severe obesity (BMI 35.0-39. 9) with comorbidity ([address]    5. Overactive bladder    6. Uterovaginal prolapse       We will start with estradiol cream looks like she has UTI as well bring her back in a month reevaluate her she may need cystoscopy she is aware of side effects of Cipro including tendinitis        Pelon Mould may have a reminder for a \"due or due soon\" health maintenance. The patient has been encouraged to contact their primary care provider for follow-up on this health maintenance or other necessary and/or routine health screening.      Amy Cabrera MD

## 2022-10-05 ENCOUNTER — OFFICE VISIT (OUTPATIENT)
Dept: UROLOGY | Age: 57
End: 2022-10-05
Payer: MEDICARE

## 2022-10-05 VITALS
BODY MASS INDEX: 37.22 KG/M2 | WEIGHT: 260 LBS | DIASTOLIC BLOOD PRESSURE: 72 MMHG | HEART RATE: 49 BPM | HEIGHT: 70 IN | SYSTOLIC BLOOD PRESSURE: 129 MMHG

## 2022-10-05 DIAGNOSIS — E66.01 SEVERE OBESITY (BMI 35.0-39.9) WITH COMORBIDITY (HCC): ICD-10-CM

## 2022-10-05 DIAGNOSIS — N39.41 URGE INCONTINENCE OF URINE: Primary | ICD-10-CM

## 2022-10-05 DIAGNOSIS — N81.4 UTEROVAGINAL PROLAPSE: ICD-10-CM

## 2022-10-05 DIAGNOSIS — N32.81 OVERACTIVE BLADDER: ICD-10-CM

## 2022-10-05 DIAGNOSIS — N95.2 ATROPHIC VAGINITIS: ICD-10-CM

## 2022-10-05 DIAGNOSIS — N39.0 URINARY TRACT INFECTION WITHOUT HEMATURIA, SITE UNSPECIFIED: ICD-10-CM

## 2022-10-05 LAB
BILIRUB UR QL: NEGATIVE
GLUCOSE UR-MCNC: >=1000 MG/DL
KETONES P FAST UR STRIP-MCNC: NEGATIVE MG/DL
PH UR STRIP: 5.5 [PH] (ref 4.6–8)
PROT UR QL STRIP: NEGATIVE
PVR POC: 23 CC
SP GR UR STRIP: 1.03 (ref 1–1.03)
UA UROBILINOGEN AMB POC: NORMAL (ref 0.2–1)
URINALYSIS CLARITY POC: CLEAR
URINALYSIS COLOR POC: YELLOW
URINE BLOOD POC: NEGATIVE
URINE LEUKOCYTES POC: NEGATIVE
URINE NITRITES POC: POSITIVE

## 2022-10-05 PROCEDURE — 99204 OFFICE O/P NEW MOD 45 MIN: CPT | Performed by: UROLOGY

## 2022-10-05 PROCEDURE — G9717 DOC PT DX DEP/BP F/U NT REQ: HCPCS | Performed by: UROLOGY

## 2022-10-05 PROCEDURE — 81003 URINALYSIS AUTO W/O SCOPE: CPT | Performed by: UROLOGY

## 2022-10-05 PROCEDURE — 51798 US URINE CAPACITY MEASURE: CPT | Performed by: UROLOGY

## 2022-10-05 PROCEDURE — G8427 DOCREV CUR MEDS BY ELIG CLIN: HCPCS | Performed by: UROLOGY

## 2022-10-05 PROCEDURE — G8752 SYS BP LESS 140: HCPCS | Performed by: UROLOGY

## 2022-10-05 PROCEDURE — G8754 DIAS BP LESS 90: HCPCS | Performed by: UROLOGY

## 2022-10-05 PROCEDURE — 3017F COLORECTAL CA SCREEN DOC REV: CPT | Performed by: UROLOGY

## 2022-10-05 PROCEDURE — G8417 CALC BMI ABV UP PARAM F/U: HCPCS | Performed by: UROLOGY

## 2022-10-05 RX ORDER — ESTRADIOL 0.1 MG/G
1 CREAM VAGINAL
Qty: 42.5 G | Refills: 2 | Status: SHIPPED | OUTPATIENT
Start: 2022-10-05

## 2022-10-05 RX ORDER — CIPROFLOXACIN 250 MG/1
250 TABLET, FILM COATED ORAL 2 TIMES DAILY
Qty: 14 TABLET | Refills: 0 | Status: SHIPPED | OUTPATIENT
Start: 2022-10-05

## 2022-10-05 NOTE — PROGRESS NOTES
Chief Complaint   Patient presents with    Urge Incontinence    New Patient       1. Have you been to the ER, urgent care clinic since your last visit? Hospitalized since your last visit? No    2. Have you seen or consulted any other health care providers outside of the [address] [place] [address] since your last visit? Include any pap smears or colon screening.  No    Visit Vitals  /72 (BP 1 Location: Right upper arm, BP Patient Position: Sitting, BP Cuff Size: Adult long)   Pulse (!) 49   Ht 5' 10\" (1.778 m)   Wt 260 lb (117.9 kg)   BMI 37.31 kg/m²

## 2022-10-08 LAB — BACTERIA UR CULT: ABNORMAL

## 2022-10-09 NOTE — PROGRESS NOTES
On Cipro Rx'd at visit date[place] [facility] [place] [place] [place]    SUBJECTIVE:  -  examined overnight due to concern for NEC from primary team  -  repogle placed to LCWS    OBJECTIVE:    Vital Signs Last 24 Hrs  T(C): 36.5 (06 Feb 2019 05:00), Max: 36.9 (05 Feb 2019 17:06)  T(F): 97.7 (06 Feb 2019 05:00), Max: 98.4 (05 Feb 2019 17:06)  HR: 124 (06 Feb 2019 05:00) (100 - 142)  BP: 102/53 (06 Feb 2019 05:00) (85/54 - 105/71)  BP(mean): 71 (06 Feb 2019 05:00) (71 - 82)  RR: 44 (06 Feb 2019 05:00) (22 - 44)  SpO2: 98% (06 Feb 2019 05:00) (75% - 100%)    I&O's Detail    05 Feb 2019 07:01  -  06 Feb 2019 07:00  --------------------------------------------------------  IN:    dextrose 5% + sodium chloride 0.45% with potassium chloride 20 mEq/L. - Pediatri: 210 mL    IV PiggyBack: 15 mL    Oral Fluid: 90 mL  Total IN: 315 mL    OUT:    Incontinent per Diaper: 173 mL  Total OUT: 173 mL    Total NET: 142 mL        LABS:                        8.7    4.81  )-----------( 82       ( 05 Feb 2019 15:34 )             24.1     02-04    140  |  109<H>  |  4<L>  ----------------------------<  82  4.5   |  21<L>  |  0.41    Ca    9.7      04 Feb 2019 12:30  Phos  4.5     02-04  Mg     2.0     02-04      EXAM:  Gen:       alert, in NAD  Lungs:       unlabored breathing  CV:       regular rate, rhythm  Abd:       abd soft no peritoneal signs, non tender, slight distension

## 2022-11-02 NOTE — PROGRESS NOTES
HISTORY OF PRESENT ILLNESS    Funmilayo Grajeda is a 65 y.o. female is here for follow up on urge incontinence. It is been going on for several years  She has been treated with cipro for UTI infection and prescribed estrace cream     She has been put on estrace cream last appointment, reports using it  Today she reports that after taking  cipro she has vaginal itching( has been scratching it a lot). She denies burning sensation, N/V gorss hematuria. She has urge incontinence at times. She has nocturia x2-3 times, day time x5-7       Reports using estrace cream  She run out of Myrebetriq waiting for rx from her PCP  She has been on Oxybutynin and Myrebetriq for a few years reports it helps her some  She denies stress incontience  Her urine is positive for small blood    History  She has a history of  T2DM,CAD,HTN  10/5/22 urine culture was positive with Escherichia coli  She had burning with urination recently had Vaginal pruritus x 1.5 months   She is status post hysterectomy   She is status post hysterectomy  She has been put on estrace cream last appointment      Past Medical History:  PMHx (including negatives):  has a past medical history of Burning with urination, CAD (coronary artery disease), Diabetes ([address] Hypercholesterolemia, and Hypertension. PSurgHx:  has a past surgical history that includes hx coronary stent placement; hx rotator cuff repair (Left, 11/06/2020); hx colonoscopy; and hx carpal tunnel release (2007). PSocHx:  reports that she quit smoking about 17 years ago. Her smoking use included cigarettes. She has never used smokeless tobacco. She reports current alcohol use. She reports that she does not use drugs. Home Medications    Medication Sig Start Date End Date Taking? Authorizing Provider   diclofenac EC (VOLTAREN) 50 mg EC tablet Take 50 mg by mouth two (2) times a day.  9/12/22  Yes Provider, Historical   HYDROcodone-acetaminophen (NORCO) 7.5-325 mg per tablet take 1/2 tablet by mouth twice a day if needed for chronic pain 9/12/22  Yes Provider, Historical   methocarbamoL (ROBAXIN) 750 mg tablet take 1 tablet by mouth four times a day 9/12/22  Yes Provider, Historical   terbinafine HCL (LAMISIL) 250 mg tablet Take 250 mg by mouth daily. 8/8/22  Yes Provider, Historical   estradioL (ESTRACE) 0.01 % (0.1 mg/gram) vaginal cream Insert 1 g into vagina every Monday, Wednesday, Friday. 10/5/22  Yes Carla Early MD   isosorbide mononitrate ER (IMDUR) 30 mg tablet Take 30 mg by mouth in the morning. 5/31/22  Yes Provider, Historical   oxybutynin chloride XL (DITROPAN XL) 10 mg CR tablet Take 10 mg by mouth nightly. 7/27/22  Yes Provider, Historical   dulaglutide (Trulicity) 3 WQ/7.0 mL pnij 3 mg by SubCUTAneous route every seven (7) days. Stop 1.5 mg 2/25/22  Yes Shelley Asif MD   aspirin delayed-release 81 mg tablet Take 81 mg by mouth daily. Yes Provider, Historical   atorvastatin (LIPITOR) 40 mg tablet TAKE 1 TABLET BY MOUTH EVERY DAY 1/7/22  Yes Tanika Souza,    empagliflozin (Jardiance) 10 mg tablet Take 1 Tablet by mouth daily. 11/16/21  Yes Tanika Souza DO   mirabegron ER (MYRBETRIQ) 25 mg ER tablet Take 25 mg by mouth daily. Yes Provider, Historical   oxyCODONE IR (ROXICODONE) 5 mg immediate release tablet TAKE 1 TABLET BY MOUTH EVERY 6 HOURS AS NEEDED FOR BREAKTHROUGH PAIN  Patient not taking: Reported on 11/3/2022 8/11/22   Provider, Historical   ciprofloxacin HCl (CIPRO) 250 mg tablet Take 1 Tablet by mouth two (2) times a day.   Patient not taking: Reported on 11/3/2022 10/5/22   Carla Early MD   baclofen (LIORESAL) 10 mg tablet take 1 tablet by mouth three times a day if needed for STIFFNESS  Patient not taking: No sig reported 4/6/22   Provider, Historical   traMADol-acetaminophen (ULTRACET) 37.5-325 mg per tablet take 1 tablet by mouth every 8 hours if needed for pain  Patient not taking: No sig reported 7/7/22   Provider, Historical   gabapentin (NEURONTIN) 300 mg capsule Take 300 mg by mouth three (3) times daily. Patient not taking: No sig reported 12/10/21   Provider, Historical   meloxicam (MOBIC) 15 mg tablet Take 1 Tablet by mouth as needed. Patient not taking: No sig reported 1/5/22   Provider, Historical   metFORMIN (GLUCOPHAGE) 1,000 mg tablet Take 1 Tablet by mouth two (2) times daily (with meals). Stop Glipizide  Patient not taking: No sig reported 7/29/21   Rachelle Moncada MD   Blood-Glucose Meter (Accu-Chek Lorie Plus Meter) misc TEST BLOOD GLUCOSE TWICE DAILY Dx Code:E 11.65  Patient not taking: No sig reported 9/28/20   Rachelle Moncada MD   glucose blood VI test strips (Accu-Chek Lorie Plus test strp) strip TEST BLOOD GLUCOSE TWICE DAILY Dx Code:E 11.65  Patient not taking: No sig reported 9/28/20   Rachelle Moncada MD   lancets (Accu-Chek Softclix Lancets) misc TEST BLOOD GLUCOSE TWICE DAILY Dx Code:E 11.65  Patient not taking: No sig reported 9/28/20   Rachelle Moncada MD        Chronic Conditions Addressed Today       1. Overactive bladder    2. Urge incontinence of urine    3. Urinary tract infection without hematuria     Relevant Medications     terbinafine HCL (LAMISIL) 250 mg tablet    4. Atrophic vaginitis    5. Cystitis - Primary     Relevant Orders     AMB POC URINALYSIS DIP STICK AUTO W/O MICRO (Completed)     AMB POC PVR, CHRISTIANO,POST-VOID RES,US,NON-IMAGING (Completed)     CULTURE, URINE (Completed)       Review of Systems   Constitutional: Negative. HENT: Negative. Cardiovascular:  Positive for leg swelling. Gastrointestinal:  Positive for constipation. Genitourinary:  Positive for urgency. Musculoskeletal: Negative. Skin: Negative. Neurological:  Positive for tingling. Endo/Heme/Allergies: Negative. Psychiatric/Behavioral: Negative. Patient denies the symptoms of COVID-19 per routine screening guidelines. Physical Exam  Physical Exam  Vitals and nursing note reviewed.  Exam conducted with a chaperone present. Constitutional:       General: she is not in acute distress. Appearance: Normal appearance. HENT:      Head: Normocephalic and atraumatic. Eyes:      Pupils: Pupils are equal, round, and reactive to light. Cardiovascular:      Rate and Rhythm: Normal rate and regular rhythm. Pulses: Normal pulses. Pulmonary:      Effort: Pulmonary effort is normal.   Genitourinary:  Deferred  Musculoskeletal:         General: No swelling or deformity. Patient had to use walker to move around  Skin:     Coloration: Skin is not pale. Findings: No rash. Neurological:      General: No focal deficit present. Mental Status: He is alert. Psychiatric:         Mood and Affect: Mood normal.         Behavior: Behavior normal.   ASSESSMENT and PLAN  Diagnoses and all orders for this visit:    1. Cystitis  -     AMB POC URINALYSIS DIP STICK AUTO W/O MICRO  -     AMB POC PVR, CHRISTIANO,POST-VOID RES,US,NON-IMAGING  -     CULTURE, URINE    2. Atrophic vaginitis    3. Urge incontinence of urine    4. Overactive bladder    5. Urinary tract infection without hematuria, site unspecified    Other orders  -     fluconazole (DIFLUCAN) 150 mg tablet; Take 1 Tablet by mouth daily for 1 day. FDA advises cautious prescribing of oral fluconazole in pregnancy. Keep on estrace, urine for culture        Laura Billings may have a reminder for a \"due or due soon\" health maintenance. The patient has been encouraged to contact their primary care provider for follow-up on this health maintenance or other necessary and/or routine health screening.

## 2022-11-03 ENCOUNTER — OFFICE VISIT (OUTPATIENT)
Dept: UROLOGY | Age: 57
End: 2022-11-03
Payer: MEDICARE

## 2022-11-03 VITALS
WEIGHT: 260 LBS | HEIGHT: 70 IN | TEMPERATURE: 97.9 F | DIASTOLIC BLOOD PRESSURE: 77 MMHG | SYSTOLIC BLOOD PRESSURE: 137 MMHG | HEART RATE: 55 BPM | BODY MASS INDEX: 37.22 KG/M2 | OXYGEN SATURATION: 97 % | RESPIRATION RATE: 16 BRPM

## 2022-11-03 DIAGNOSIS — N32.81 OVERACTIVE BLADDER: ICD-10-CM

## 2022-11-03 DIAGNOSIS — N39.41 URGE INCONTINENCE OF URINE: ICD-10-CM

## 2022-11-03 DIAGNOSIS — N95.2 ATROPHIC VAGINITIS: ICD-10-CM

## 2022-11-03 DIAGNOSIS — N39.0 URINARY TRACT INFECTION WITHOUT HEMATURIA, SITE UNSPECIFIED: ICD-10-CM

## 2022-11-03 DIAGNOSIS — N30.90 CYSTITIS: Primary | ICD-10-CM

## 2022-11-03 PROBLEM — R35.0 FREQUENCY OF MICTURITION: Status: ACTIVE | Noted: 2022-11-03

## 2022-11-03 LAB
BILIRUB UR QL: NEGATIVE
GLUCOSE UR-MCNC: >=1000 MG/DL
KETONES P FAST UR STRIP-MCNC: NEGATIVE MG/DL
PH UR STRIP: 5 [PH] (ref 4.6–8)
PROT UR QL STRIP: NEGATIVE
PVR POC: NORMAL CC
SP GR UR STRIP: 1.02 (ref 1–1.03)
UA UROBILINOGEN AMB POC: NORMAL (ref 0.2–1)
URINALYSIS CLARITY POC: CLEAR
URINALYSIS COLOR POC: YELLOW
URINE BLOOD POC: NORMAL
URINE LEUKOCYTES POC: NEGATIVE
URINE NITRITES POC: NEGATIVE

## 2022-11-03 RX ORDER — FLUCONAZOLE 150 MG/1
150 TABLET ORAL DAILY
Qty: 1 TABLET | Refills: 0 | Status: SHIPPED | OUTPATIENT
Start: 2022-11-03 | End: 2022-11-04

## 2022-11-03 RX ORDER — HYDROCODONE BITARTRATE AND ACETAMINOPHEN 7.5; 325 MG/1; MG/1
TABLET ORAL
COMMUNITY
Start: 2022-09-12

## 2022-11-03 RX ORDER — METHOCARBAMOL 750 MG/1
TABLET, FILM COATED ORAL
COMMUNITY
Start: 2022-09-12

## 2022-11-03 RX ORDER — DICLOFENAC SODIUM 50 MG/1
50 TABLET, DELAYED RELEASE ORAL 2 TIMES DAILY
COMMUNITY
Start: 2022-09-12

## 2022-11-03 RX ORDER — TERBINAFINE HYDROCHLORIDE 250 MG/1
250 TABLET ORAL DAILY
COMMUNITY
Start: 2022-08-08

## 2022-11-03 RX ORDER — OXYCODONE HYDROCHLORIDE 5 MG/1
TABLET ORAL
COMMUNITY
Start: 2022-08-11

## 2022-11-03 NOTE — PROGRESS NOTES
Chief Complaint   Patient presents with    Follow-up    Enuresis    Recurrent UTI    Vaginitis       PHQ-9 score is    Negative    Vitals:    11/03/22 1213   BP: 137/77   Pulse: (!) 55   Resp: 16   Temp: 97.9 °F (36.6 °C)   TempSrc: Temporal   SpO2: 97%   Weight: 260 lb (117.9 kg)   Height: 5' 10\" (1.778 m)        1. \"Have you been to the ER, urgent care clinic since your last visit? Hospitalized since your last visit? \" No    2. \"Have you seen or consulted any other health care providers outside of the [address] [place] [address] since your last visit? \" No     3. For patients aged 39-70: Has the patient had a colonoscopy / FIT/ Cologuard? Yes - no Care Gap present      If the patient is female:    4. For patients aged 41-77: Has the patient had a mammogram within the past 2 years? Yes - no Care Gap present      5. For patients aged 21-65: Has the patient had a pap smear?  Yes - no Care Gap present

## 2022-11-12 LAB — BACTERIA UR CULT: NORMAL

## 2022-11-30 PROBLEM — N30.90 CYSTITIS: Status: ACTIVE | Noted: 2022-11-30

## 2023-03-14 ENCOUNTER — OFFICE VISIT (OUTPATIENT)
Dept: ENDOCRINOLOGY | Age: 58
End: 2023-03-14
Payer: MEDICARE

## 2023-03-14 VITALS
OXYGEN SATURATION: 98 % | BODY MASS INDEX: 38.74 KG/M2 | SYSTOLIC BLOOD PRESSURE: 123 MMHG | HEART RATE: 46 BPM | WEIGHT: 270.6 LBS | RESPIRATION RATE: 18 BRPM | HEIGHT: 70 IN | TEMPERATURE: 97.8 F | DIASTOLIC BLOOD PRESSURE: 57 MMHG

## 2023-03-14 DIAGNOSIS — E78.2 HYPERLIPIDEMIA, MIXED: ICD-10-CM

## 2023-03-14 DIAGNOSIS — E11.40 TYPE 2 DIABETES MELLITUS WITH DIABETIC NEUROPATHY, WITHOUT LONG-TERM CURRENT USE OF INSULIN (HCC): Primary | ICD-10-CM

## 2023-03-14 DIAGNOSIS — I10 ESSENTIAL HYPERTENSION WITH GOAL BLOOD PRESSURE LESS THAN 140/90: ICD-10-CM

## 2023-03-14 DIAGNOSIS — Z79.4 UNCONTROLLED TYPE 2 DIABETES MELLITUS WITH HYPERGLYCEMIA, WITH LONG-TERM CURRENT USE OF INSULIN (HCC): ICD-10-CM

## 2023-03-14 DIAGNOSIS — E11.65 UNCONTROLLED TYPE 2 DIABETES MELLITUS WITH HYPERGLYCEMIA, WITH LONG-TERM CURRENT USE OF INSULIN (HCC): ICD-10-CM

## 2023-03-14 LAB — HBA1C MFR BLD HPLC: 6.6 %

## 2023-03-14 PROCEDURE — 3074F SYST BP LT 130 MM HG: CPT | Performed by: INTERNAL MEDICINE

## 2023-03-14 PROCEDURE — 3078F DIAST BP <80 MM HG: CPT | Performed by: INTERNAL MEDICINE

## 2023-03-14 PROCEDURE — 99214 OFFICE O/P EST MOD 30 MIN: CPT | Performed by: INTERNAL MEDICINE

## 2023-03-14 PROCEDURE — 2022F DILAT RTA XM EVC RTNOPTHY: CPT | Performed by: INTERNAL MEDICINE

## 2023-03-14 PROCEDURE — 83036 HEMOGLOBIN GLYCOSYLATED A1C: CPT | Performed by: INTERNAL MEDICINE

## 2023-03-14 PROCEDURE — 3017F COLORECTAL CA SCREEN DOC REV: CPT | Performed by: INTERNAL MEDICINE

## 2023-03-14 PROCEDURE — G8417 CALC BMI ABV UP PARAM F/U: HCPCS | Performed by: INTERNAL MEDICINE

## 2023-03-14 PROCEDURE — G8427 DOCREV CUR MEDS BY ELIG CLIN: HCPCS | Performed by: INTERNAL MEDICINE

## 2023-03-14 PROCEDURE — 3044F HG A1C LEVEL LT 7.0%: CPT | Performed by: INTERNAL MEDICINE

## 2023-03-14 PROCEDURE — G9717 DOC PT DX DEP/BP F/U NT REQ: HCPCS | Performed by: INTERNAL MEDICINE

## 2023-03-14 RX ORDER — DULAGLUTIDE 4.5 MG/.5ML
4.5 INJECTION, SOLUTION SUBCUTANEOUS
Qty: 12 EACH | Refills: 3 | Status: SHIPPED | OUTPATIENT
Start: 2023-03-14

## 2023-03-14 NOTE — LETTER
3/14/2023    Patient: Bony Gonsales   YOB: 1965   Date of Visit: 3/14/2023     Margi Rollins MD  [address] [place] [address]  Via Fax: [phone]    Dear Margi Rollins MD,      Thank you for referring Ms. Lara Zapata to [facility] for evaluation. My notes for this consultation are attached. If you have questions, please do not hesitate to call me. I look forward to following your patient along with you.       Sincerely,    Ngozi Sainz MD

## 2023-03-14 NOTE — PROGRESS NOTES
Jasper Nissen ,MD        Patient Information  Date:3/14/2023  Name : Anny Moreno 65 y.o.     YOB: 1965         Referred by: Zenovia Fleischer, MD     Chief Complaint   Patient presents with    Diabetes       History of Present Illness: Anny Moreno is a 65 y.o. female here for fu of Type 2 Diabetes Mellitus. 3/14/23  POC A1C 6.6% today  Under a lot of stress, stress eating  Unable to get Trulicity D3-0 mo - backorder  Now on Januvia - did not notice any difference  Not taking Metformin - skeptical because of side effects   Jardiance - tolerating  Staying hydrated  No yeast infections  Limited Activity    Prior history  She was referred by Zenovia Fleischer, MD for evaluation and management of diabetes. No severe hypoglycemia  On metformin, tolerating Trulicity  No meter or logbook  Not able to walk due to arthralgia    She is compliant with the medications      She was diagnosed with type 2 diabetes 30 years ago,       Wt Readings from Last 3 Encounters:   03/14/23 270 lb 9.6 oz (122.7 kg)   11/03/22 260 lb (117.9 kg)   10/05/22 260 lb (117.9 kg)       BP Readings from Last 3 Encounters:   03/14/23 (!) 123/57   11/03/22 137/77   10/05/22 129/72        Reviewed and updated this visit by clinical staff:  Tobacco  Allergies  Meds  Problems  Med Hx  Surg Hx  Fam Hx           Review of Systems:  Per HPI    Physical Examination:   Blood pressure (!) 123/57, pulse (!) 46, temperature 97.8 °F (36.6 °C), temperature source Temporal, resp. rate 18, height 5' 10\" (1.778 m), weight 270 lb 9.6 oz (122.7 kg), SpO2 98 %. Estimated body mass index is 38.83 kg/m² as calculated from the following:    Height as of this encounter: 5' 10\" (1.778 m). Weight as of this encounter: 270 lb 9.6 oz (122.7 kg).   General: pleasant, no distress, good eye contact  HEENT: no exophthalmos, no periorbital edema, EOMI  Neck: No thyromegaly  CVS: S1-S2 regular  RS: Normal respiratory effort  Musculoskeletal: no tremors  Neurological: alert and oriented  Psychiatric: normal mood and affect  Skin: Normal color    Diabetic foot exam ; July 2021    H/o partial or complete amputation of foot : No  H/o previous foot ulceration : No  H/o pre - ulcerative callus : No  H/o peripheral neuropathy and callus : No  H/o poor circulation  : No  Foot deformity : None    Data Reviewed:     Lab Results   Component Value Date/Time    Hemoglobin A1c 6.1 (H) 12/07/2022 01:54 PM    Hemoglobin A1c 6.0 (H) 06/28/2022 09:20 AM    Hemoglobin A1c 6.6 (H) 10/22/2021 12:00 AM    Hemoglobin A1c, External 7.0 03/04/2020 12:00 AM    Glucose 97 12/07/2022 01:54 PM    Glucose  10/19/2018 10:10 AM    Microalb/Creat ratio (ug/mg creat.) 7 06/28/2022 09:20 AM    LDL,Direct 48 06/28/2022 09:20 AM    LDL, calculated 52 02/07/2019 08:58 AM    Creatinine 0.81 12/07/2022 01:54 PM      Lab Results   Component Value Date/Time    Cholesterol, total 103 02/07/2019 08:58 AM    HDL Cholesterol 38 (L) 02/07/2019 08:58 AM    LDL,Direct 48 06/28/2022 09:20 AM    LDL, calculated 52 02/07/2019 08:58 AM    LDL-C, External 46 06/09/2020 12:00 AM    Triglyceride 64 02/07/2019 08:58 AM     Lab Results   Component Value Date/Time    GFR est non-AA 67 11/23/2021 01:17 PM    GFR est AA 77 11/23/2021 01:17 PM    Creatinine 0.81 12/07/2022 01:54 PM    BUN 8 12/07/2022 01:54 PM    Sodium 144 12/07/2022 01:54 PM    Potassium 3.9 12/07/2022 01:54 PM    Chloride 110 (H) 12/07/2022 01:54 PM    CO2 27 12/07/2022 01:54 PM         Assessment/Plan:     No diagnosis found.       1. Type 2 Diabetes Mellitus , neuropathy   Lab Results   Component Value Date/Time    Hemoglobin A1c 6.1 (H) 12/07/2022 01:54 PM    Hemoglobin A1c (POC) 6.4 02/25/2022 11:02 AM    Hemoglobin A1c, External 7.0 03/04/2020 12:00 AM   Controlled   continue Metformin,   Trulicity, on Januvia while Trulicity is on backorder, Jardiance   She was on insulin in the past, weight loss is the delgado which was discussed    Advised to check glucose once daily  FLU annually ,Pneumovax ,aspirin daily,annual eye exam,microalbumin    2. HTN : Continue current therapy       3. Hyperlipidemia : Continue statin. 4.Obesity:Body mass index is 38.83 kg/m². Discussed about the importance of exercise and carbohydrate portion control. Weight loss discussed      Peripheral neuropathy: Stable    CAD -       Thank you for allowing me to participate in the care of this patient.     Yanet Kruse MD    Patient verbalized understanding

## 2023-03-14 NOTE — PROGRESS NOTES
Luna Farrell is a 65 y.o. female here for   Chief Complaint   Patient presents with    Diabetes       1. Have you been to the ER or an urgent care clinic since your last visit?  - yes [address] ER  10/2022 Constipation     2. Have you been hospitalized since your last visit? - no    3. Have you seen or consulted any other health care providers outside of the [address] [place] [address] since your last visit?   Include any pap smears or colon screening.- no

## 2023-04-23 DIAGNOSIS — E11.40 TYPE 2 DIABETES MELLITUS WITH DIABETIC NEUROPATHY, WITHOUT LONG-TERM CURRENT USE OF INSULIN (HCC): Primary | ICD-10-CM

## 2023-04-23 DIAGNOSIS — E78.2 HYPERLIPIDEMIA, MIXED: ICD-10-CM

## 2023-04-23 DIAGNOSIS — I10 ESSENTIAL HYPERTENSION WITH GOAL BLOOD PRESSURE LESS THAN 140/90: ICD-10-CM

## 2023-04-24 DIAGNOSIS — E11.40 TYPE 2 DIABETES MELLITUS WITH DIABETIC NEUROPATHY, WITHOUT LONG-TERM CURRENT USE OF INSULIN (HCC): Primary | ICD-10-CM

## 2023-04-24 DIAGNOSIS — I10 ESSENTIAL HYPERTENSION WITH GOAL BLOOD PRESSURE LESS THAN 140/90: ICD-10-CM

## 2023-04-24 DIAGNOSIS — E78.2 HYPERLIPIDEMIA, MIXED: ICD-10-CM

## 2023-06-06 NOTE — PROGRESS NOTES
HISTORY OF PRESENT ILLNESS    Wing Naranjo is a 65 y.o. female is here for follow up on urge incontinence and nocturia which has been going on for several years. She has been on Oxybutynin and Myrebetriq for a few years reports it helps her some. She was prescribed estrace cream. She has history of UTI infection( E-coli)   Today her PVR is 7 cc,urine is positive for +4 glucose, PUFF 14, frequency 3-6 per day  Well to the combination of adding Estrace cream.  She denies fevers chills flank pain nausea vomiting. She continues with a 4+ sugar due to diabetes under better control             Past Medical History:  PMHx (including negatives):  has a past medical history of Burning with urination, CAD (coronary artery disease), Diabetes ([address] Hypercholesterolemia, and Hypertension. PSurgHx:  has a past surgical history that includes Carpal tunnel release (2007); Colonoscopy; Rotator cuff repair (Left, 11/06/2020); and Coronary angioplasty with stent. PSocHx:  reports that she quit smoking about 18 years ago. Her smoking use included cigarettes. She has never used smokeless tobacco. She reports current alcohol use. She reports that she does not use drugs. [unfilled]     1. Atrophic vaginitis  -     AMB POC URINALYSIS DIP STICK AUTO W/O MICRO  -     AMB POC PVR, JEFF,POST-VOID RES,US,NON-IMAGING  2. Frequency of micturition  -     AMB POC URINALYSIS DIP STICK AUTO W/O MICRO  -     AMB POC PVR, JEFF,POST-VOID RES,US,NON-IMAGING  3. Urge incontinence of urine  Overview:  She has urge incontinence at times. She has nocturia x2-3 times, day time x5-7  Reports using estrace cream  She run out of Myrebetriq waiting for rx from her PCP  She has been on Oxybutynin and Myrebetriq for a few years reports it helps   her some  She denies stress incontience      Orders:  -     AMB POC URINALYSIS DIP STICK AUTO W/O MICRO  -     AMB POC PVR, JEFF,POST-VOID RES,US,NON-IMAGING  4.  Acute cystitis without hematuria  -     AMB

## 2023-06-07 ENCOUNTER — OFFICE VISIT (OUTPATIENT)
Age: 58
End: 2023-06-07
Payer: MEDICARE

## 2023-06-07 VITALS
HEIGHT: 70 IN | HEART RATE: 72 BPM | RESPIRATION RATE: 16 BRPM | TEMPERATURE: 98 F | WEIGHT: 259 LBS | OXYGEN SATURATION: 98 % | BODY MASS INDEX: 37.08 KG/M2 | SYSTOLIC BLOOD PRESSURE: 111 MMHG | DIASTOLIC BLOOD PRESSURE: 71 MMHG

## 2023-06-07 DIAGNOSIS — N39.0 URINARY TRACT INFECTION WITHOUT HEMATURIA, SITE UNSPECIFIED: ICD-10-CM

## 2023-06-07 DIAGNOSIS — N39.41 URGE INCONTINENCE OF URINE: ICD-10-CM

## 2023-06-07 DIAGNOSIS — N30.00 ACUTE CYSTITIS WITHOUT HEMATURIA: ICD-10-CM

## 2023-06-07 DIAGNOSIS — R35.0 FREQUENCY OF MICTURITION: ICD-10-CM

## 2023-06-07 DIAGNOSIS — N95.2 ATROPHIC VAGINITIS: Primary | ICD-10-CM

## 2023-06-07 LAB
BILIRUBIN, URINE, POC: NEGATIVE
BLOOD URINE, POC: NEGATIVE
GLUCOSE URINE, POC: 1000
KETONES, URINE, POC: NEGATIVE
LEUKOCYTE ESTERASE, URINE, POC: NEGATIVE
NITRITE, URINE, POC: NEGATIVE
PH, URINE, POC: 5.5 (ref 4.6–8)
PROTEIN,URINE, POC: NORMAL
PVR, POC: NORMAL CC
SPECIFIC GRAVITY, URINE, POC: 1.02 (ref 1–1.03)
URINALYSIS CLARITY, POC: CLEAR
URINALYSIS COLOR, POC: YELLOW
UROBILINOGEN, POC: NORMAL

## 2023-06-07 PROCEDURE — 51798 US URINE CAPACITY MEASURE: CPT | Performed by: UROLOGY

## 2023-06-07 PROCEDURE — 81003 URINALYSIS AUTO W/O SCOPE: CPT | Performed by: UROLOGY

## 2023-06-07 PROCEDURE — 99214 OFFICE O/P EST MOD 30 MIN: CPT | Performed by: UROLOGY

## 2023-06-07 RX ORDER — CYCLOBENZAPRINE HCL 10 MG
TABLET ORAL
COMMUNITY
Start: 2023-04-06

## 2023-06-07 RX ORDER — MELOXICAM 7.5 MG/1
7.5 TABLET ORAL DAILY
COMMUNITY
Start: 2023-05-23

## 2023-06-07 ASSESSMENT — ENCOUNTER SYMPTOMS: CONSTIPATION: 1

## 2023-06-07 NOTE — PROGRESS NOTES
Chief Complaint   Patient presents with    Follow-up    Pelvic Inflammatory Disease    Enuresis        /71   Pulse 72   Temp 98 °F (36.7 °C)   Resp 16   Ht 5' 10\" (1.778 m)   Wt 259 lb (117.5 kg)   SpO2 98%   BMI 37.16 kg/m²      PHQ-9 score is    Negative      1. \"Have you been to the ER, urgent care clinic since your last visit? Hospitalized since your last visit? \" No    2. \"Have you seen or consulted any other health care providers outside of the [address] [place] [address] since your last visit? \" No     3. For patients aged 39-70: Has the patient had a colonoscopy / FIT/ Cologuard? Yes - no Care Gap present      If the patient is female:    4. For patients aged 41-77: Has the patient had a mammogram within the past 2 years? Yes - no Care Gap present      5. For patients aged 21-65: Has the patient had a pap smear?  Yes - no Care Gap present

## 2023-07-22 DIAGNOSIS — E11.65 TYPE 2 DIABETES MELLITUS WITH HYPERGLYCEMIA, WITHOUT LONG-TERM CURRENT USE OF INSULIN (HCC): Primary | ICD-10-CM

## 2023-07-24 RX ORDER — EMPAGLIFLOZIN 10 MG/1
TABLET, FILM COATED ORAL
Qty: 90 TABLET | Refills: 3 | Status: SHIPPED | OUTPATIENT
Start: 2023-07-24

## 2023-08-21 ENCOUNTER — NURSE ONLY (OUTPATIENT)
Age: 58
End: 2023-08-21

## 2023-08-21 DIAGNOSIS — I10 ESSENTIAL HYPERTENSION WITH GOAL BLOOD PRESSURE LESS THAN 140/90: ICD-10-CM

## 2023-08-21 DIAGNOSIS — E11.40 TYPE 2 DIABETES MELLITUS WITH DIABETIC NEUROPATHY, WITHOUT LONG-TERM CURRENT USE OF INSULIN (HCC): ICD-10-CM

## 2023-08-21 DIAGNOSIS — E78.2 HYPERLIPIDEMIA, MIXED: ICD-10-CM

## 2023-08-22 LAB
ANION GAP SERPL CALC-SCNC: 6 MMOL/L (ref 5–15)
BUN SERPL-MCNC: 11 MG/DL (ref 6–20)
BUN/CREAT SERPL: 13 (ref 12–20)
CALCIUM SERPL-MCNC: 8.7 MG/DL (ref 8.5–10.1)
CHLORIDE SERPL-SCNC: 112 MMOL/L (ref 97–108)
CO2 SERPL-SCNC: 25 MMOL/L (ref 21–32)
CREAT SERPL-MCNC: 0.82 MG/DL (ref 0.55–1.02)
EST. AVERAGE GLUCOSE BLD GHB EST-MCNC: 111 MG/DL
GLUCOSE SERPL-MCNC: 108 MG/DL (ref 65–100)
HBA1C MFR BLD: 5.5 % (ref 4–5.6)
LDLC SERPL DIRECT ASSAY-MCNC: 43 MG/DL (ref 0–100)
POTASSIUM SERPL-SCNC: 4.1 MMOL/L (ref 3.5–5.1)
SODIUM SERPL-SCNC: 143 MMOL/L (ref 136–145)

## 2023-08-28 ENCOUNTER — OFFICE VISIT (OUTPATIENT)
Age: 58
End: 2023-08-28
Payer: MEDICARE

## 2023-08-28 VITALS
TEMPERATURE: 98 F | DIASTOLIC BLOOD PRESSURE: 50 MMHG | BODY MASS INDEX: 36.92 KG/M2 | HEART RATE: 60 BPM | SYSTOLIC BLOOD PRESSURE: 98 MMHG | OXYGEN SATURATION: 98 % | RESPIRATION RATE: 18 BRPM | HEIGHT: 70 IN | WEIGHT: 257.9 LBS

## 2023-08-28 DIAGNOSIS — E11.40 TYPE 2 DIABETES MELLITUS WITH DIABETIC NEUROPATHY, WITHOUT LONG-TERM CURRENT USE OF INSULIN (HCC): Primary | ICD-10-CM

## 2023-08-28 DIAGNOSIS — E78.2 HYPERLIPIDEMIA, MIXED: ICD-10-CM

## 2023-08-28 DIAGNOSIS — I10 ESSENTIAL HYPERTENSION WITH GOAL BLOOD PRESSURE LESS THAN 140/90: ICD-10-CM

## 2023-08-28 PROCEDURE — 3044F HG A1C LEVEL LT 7.0%: CPT | Performed by: INTERNAL MEDICINE

## 2023-08-28 PROCEDURE — 3078F DIAST BP <80 MM HG: CPT | Performed by: INTERNAL MEDICINE

## 2023-08-28 PROCEDURE — 3074F SYST BP LT 130 MM HG: CPT | Performed by: INTERNAL MEDICINE

## 2023-08-28 PROCEDURE — 99214 OFFICE O/P EST MOD 30 MIN: CPT | Performed by: INTERNAL MEDICINE

## 2023-08-28 RX ORDER — DULAGLUTIDE 4.5 MG/.5ML
4.5 INJECTION, SOLUTION SUBCUTANEOUS
Qty: 12 ADJUSTABLE DOSE PRE-FILLED PEN SYRINGE | Refills: 3 | Status: SHIPPED | OUTPATIENT
Start: 2023-08-28

## 2023-08-28 NOTE — PROGRESS NOTES
Solo Alfaro MD            Patient Information   Date:3/14/2023   Name : Kaycee Smith 65 y.o.       YOB: 1965           Referred by: Ed Rodgers MD           History of Present Illness: Kaycee Smith is a 65 y.o. female here  for fu of Type 2 Diabetes Mellitus dx at age of 24 years  . She is on Trulicity, tolerating well  Getting it consistently, while she was off Trulicity was on Januvia  Hydrating well  No severe hypoglycemia       Prior history   She was referred by Ed Rodgers MD for evaluation and management of diabetes. Reviewed and updated this visit by clinical staff:   Tobacco  Allergies  Meds  Problems  Med Hx  Surg Hx  Fam Hx          Physical Examination:      General: pleasant, no distress, good eye contact    HEENT: no exophthalmos, no periorbital edema, EOMI    Neck: No thyromegaly    CVS: S1-S2 regular    RS: Normal respiratory effort    Musculoskeletal: no tremors    Neurological: alert and oriented    Psychiatric: normal mood and affect    Skin: Normal color      Diabetic foot exam ; August 2023       H/o partial or complete amputation of foot : No   H/o previous foot ulceration : No   H/o pre - ulcerative callus : No   H/o peripheral neuropathy and callus : No   H/o poor circulation  : No   Foot deformity : hammer toe       Data Reviewed:          Lab Results   Component Value Date    GFRAA 77 11/23/2021        Lab Results   Component Value Date    LABA1C 5.5 08/21/2023    LABA1C 6.1 (H) 12/07/2022    LABA1C 6.0 (H) 06/28/2022         Lab Results   Component Value Date     08/21/2023    K 4.1 08/21/2023     (H) 08/21/2023    CO2 25 08/21/2023    BUN 11 08/21/2023    CREATININE 0.82 08/21/2023    GFRAA 77 11/23/2021    GLUCOSE 108 (H) 08/21/2023    CALCIUM 8.7 08/21/2023    MALBCR 7 06/28/2022                       Assessment/Plan:          1.  Type 2 Diabetes

## 2023-08-28 NOTE — PROGRESS NOTES
Panfilo Magaña is a 65 y.o. female here for   Chief Complaint   Patient presents with    Diabetes       1. Have you been to the ER, urgent care clinic since your last visit? Hospitalized since your last visit? -no    2. Have you seen or consulted any other health care providers outside of the [address] [place] [address] Avenue since your last visit? Include any pap smears or colon screening. -PCP

## 2023-10-05 NOTE — PRE-PROCEDURE INSTRUCTIONS
Pat completed with pt via telephone. Pt instructed to arrive at 0930. Procedure instructions gone over with pt.

## 2023-10-11 ENCOUNTER — HOSPITAL ENCOUNTER (OUTPATIENT)
Facility: HOSPITAL | Age: 58
Setting detail: OUTPATIENT SURGERY
Discharge: HOME | End: 2023-10-11
Attending: INTERNAL MEDICINE | Admitting: INTERNAL MEDICINE
Payer: MEDICARE

## 2023-10-11 ENCOUNTER — ANESTHESIA EVENT (OUTPATIENT)
Facility: HOSPITAL | Age: 58
End: 2023-10-11
Payer: MEDICARE

## 2023-10-11 ENCOUNTER — ANESTHESIA (OUTPATIENT)
Facility: HOSPITAL | Age: 58
End: 2023-10-11
Payer: MEDICARE

## 2023-10-11 VITALS
HEIGHT: 70 IN | BODY MASS INDEX: 35.79 KG/M2 | TEMPERATURE: 97.5 F | SYSTOLIC BLOOD PRESSURE: 176 MMHG | HEART RATE: 53 BPM | WEIGHT: 250 LBS | RESPIRATION RATE: 18 BRPM | OXYGEN SATURATION: 99 % | DIASTOLIC BLOOD PRESSURE: 88 MMHG

## 2023-10-11 DIAGNOSIS — Z12.11 SPECIAL SCREENING FOR MALIGNANT NEOPLASMS, COLON: ICD-10-CM

## 2023-10-11 LAB
GLUCOSE BLD STRIP.AUTO-MCNC: 113 MG/DL (ref 65–100)
PERFORMED BY:: ABNORMAL

## 2023-10-11 PROCEDURE — 3600007511: Performed by: INTERNAL MEDICINE

## 2023-10-11 PROCEDURE — 3700000000 HC ANESTHESIA ATTENDED CARE: Performed by: INTERNAL MEDICINE

## 2023-10-11 PROCEDURE — 2580000003 HC RX 258: Performed by: NURSE ANESTHETIST, CERTIFIED REGISTERED

## 2023-10-11 PROCEDURE — 88305 TISSUE EXAM BY PATHOLOGIST: CPT

## 2023-10-11 PROCEDURE — 6360000002 HC RX W HCPCS: Performed by: NURSE ANESTHETIST, CERTIFIED REGISTERED

## 2023-10-11 PROCEDURE — 2500000003 HC RX 250 WO HCPCS: Performed by: NURSE ANESTHETIST, CERTIFIED REGISTERED

## 2023-10-11 PROCEDURE — 3700000001 HC ADD 15 MINUTES (ANESTHESIA): Performed by: INTERNAL MEDICINE

## 2023-10-11 PROCEDURE — 7100000010 HC PHASE II RECOVERY - FIRST 15 MIN: Performed by: INTERNAL MEDICINE

## 2023-10-11 PROCEDURE — 7100000011 HC PHASE II RECOVERY - ADDTL 15 MIN: Performed by: INTERNAL MEDICINE

## 2023-10-11 PROCEDURE — 82962 GLUCOSE BLOOD TEST: CPT

## 2023-10-11 PROCEDURE — 2709999900 HC NON-CHARGEABLE SUPPLY: Performed by: INTERNAL MEDICINE

## 2023-10-11 PROCEDURE — 3600007501: Performed by: INTERNAL MEDICINE

## 2023-10-11 RX ORDER — LIDOCAINE HYDROCHLORIDE 20 MG/ML
INJECTION, SOLUTION EPIDURAL; INFILTRATION; INTRACAUDAL; PERINEURAL PRN
Status: DISCONTINUED | OUTPATIENT
Start: 2023-10-11 | End: 2023-10-11 | Stop reason: SDUPTHER

## 2023-10-11 RX ORDER — SODIUM CHLORIDE, SODIUM LACTATE, POTASSIUM CHLORIDE, CALCIUM CHLORIDE 600; 310; 30; 20 MG/100ML; MG/100ML; MG/100ML; MG/100ML
INJECTION, SOLUTION INTRAVENOUS CONTINUOUS
Status: DISCONTINUED | OUTPATIENT
Start: 2023-10-11 | End: 2023-10-11 | Stop reason: HOSPADM

## 2023-10-11 RX ORDER — SODIUM CHLORIDE, SODIUM LACTATE, POTASSIUM CHLORIDE, CALCIUM CHLORIDE 600; 310; 30; 20 MG/100ML; MG/100ML; MG/100ML; MG/100ML
INJECTION, SOLUTION INTRAVENOUS CONTINUOUS PRN
Status: DISCONTINUED | OUTPATIENT
Start: 2023-10-11 | End: 2023-10-11 | Stop reason: SDUPTHER

## 2023-10-11 RX ORDER — SODIUM CHLORIDE 9 MG/ML
INJECTION, SOLUTION INTRAVENOUS CONTINUOUS
Status: DISCONTINUED | OUTPATIENT
Start: 2023-10-11 | End: 2023-10-11 | Stop reason: HOSPADM

## 2023-10-11 RX ORDER — GLYCOPYRROLATE 0.2 MG/ML
INJECTION INTRAMUSCULAR; INTRAVENOUS PRN
Status: DISCONTINUED | OUTPATIENT
Start: 2023-10-11 | End: 2023-10-11 | Stop reason: SDUPTHER

## 2023-10-11 RX ADMIN — LIDOCAINE HYDROCHLORIDE 100 MG: 20 INJECTION, SOLUTION EPIDURAL; INFILTRATION; INTRACAUDAL; PERINEURAL at 10:53

## 2023-10-11 RX ADMIN — GLYCOPYRROLATE 0.2 MG: 0.2 INJECTION, SOLUTION INTRAMUSCULAR; INTRAVENOUS at 10:50

## 2023-10-11 RX ADMIN — PROPOFOL 150 MG: 10 INJECTION, EMULSION INTRAVENOUS at 10:53

## 2023-10-11 RX ADMIN — PROPOFOL 50 MG: 10 INJECTION, EMULSION INTRAVENOUS at 11:00

## 2023-10-11 RX ADMIN — PROPOFOL 50 MG: 10 INJECTION, EMULSION INTRAVENOUS at 10:57

## 2023-10-11 RX ADMIN — SODIUM CHLORIDE, POTASSIUM CHLORIDE, SODIUM LACTATE AND CALCIUM CHLORIDE: 600; 310; 30; 20 INJECTION, SOLUTION INTRAVENOUS at 10:44

## 2023-10-11 ASSESSMENT — PAIN SCALES - GENERAL
PAINLEVEL_OUTOF10: 0
PAINLEVEL_OUTOF10: 0

## 2023-10-11 ASSESSMENT — PAIN - FUNCTIONAL ASSESSMENT: PAIN_FUNCTIONAL_ASSESSMENT: NONE - DENIES PAIN

## 2023-10-11 NOTE — PERIOP NOTE
Patient alert and oriented x4, VS stable, no complaints of pain at this time. No one at bedside. Bed in low position, call bell within reach. Patient states okay to review and give discharge instructions to Meche, daughter in law.

## 2023-10-11 NOTE — PROGRESS NOTES
Pt  called  to  Daughter in law Josiah Jt will   pt   and  ok to  give dishcarge instructions  to  pt and Daughter in law

## 2023-10-11 NOTE — ANESTHESIA POSTPROCEDURE EVALUATION
Department of Anesthesiology  Postprocedure Note    Patient: Jordana Lynn  MRN: [id number]  YOB: 1965  Date of evaluation: 10/11/2023      Procedure Summary     Date: 10/11/23 Room / Location: [facility] ENDO 01 / [facility] ENDOSCOPY    Anesthesia Start: 1046 Anesthesia Stop:     Procedure: COLONOSCOPY (Lower GI Region) Diagnosis:       Special screening for malignant neoplasms, colon      (Special screening for malignant neoplasms, colon [Z12.11])    Surgeons: Rajeev Hernandez MD Responsible Provider: Gardenia Elizalde MD    Anesthesia Type: MAC ASA Status: 3          Anesthesia Type: MAC    Joe Phase I: Joe Score: 10    Joe Phase II:        Anesthesia Post Evaluation    Patient location during evaluation: bedside  Patient participation: waiting for patient participation  Level of consciousness: sleepy but conscious  Pain score: 0  Airway patency: patent  Nausea & Vomiting: no nausea and no vomiting  Complications: no  Cardiovascular status: blood pressure returned to baseline  Respiratory status: acceptable and face mask  Hydration status: euvolemic  Pain management: adequate

## 2023-10-24 RX ORDER — ESTRADIOL 0.1 MG/G
CREAM VAGINAL
Qty: 42.5 G | Refills: 2 | Status: SHIPPED | OUTPATIENT
Start: 2023-10-24

## 2023-11-29 PROBLEM — N39.41 URGE INCONTINENCE OF URINE: Status: ACTIVE | Noted: 2022-02-17

## 2023-12-07 ENCOUNTER — OFFICE VISIT (OUTPATIENT)
Age: 58
End: 2023-12-07

## 2023-12-07 VITALS — SYSTOLIC BLOOD PRESSURE: 117 MMHG | DIASTOLIC BLOOD PRESSURE: 68 MMHG | HEART RATE: 53 BPM

## 2023-12-07 DIAGNOSIS — N39.41 URGE INCONTINENCE OF URINE: Primary | ICD-10-CM

## 2023-12-07 DIAGNOSIS — N39.41 URGE INCONTINENCE: ICD-10-CM

## 2023-12-07 DIAGNOSIS — N95.2 ATROPHIC VAGINITIS: ICD-10-CM

## 2023-12-07 DIAGNOSIS — R35.0 FREQUENCY OF MICTURITION: ICD-10-CM

## 2023-12-07 LAB
BILIRUBIN, URINE, POC: NEGATIVE
BLOOD URINE, POC: NEGATIVE
GLUCOSE URINE, POC: 1000
KETONES, URINE, POC: NEGATIVE
LEUKOCYTE ESTERASE, URINE, POC: NEGATIVE
NITRITE, URINE, POC: POSITIVE
PH, URINE, POC: 5.5 (ref 4.6–8)
PROTEIN,URINE, POC: NEGATIVE
PVR, POC: NORMAL CC
SPECIFIC GRAVITY, URINE, POC: 1.02 (ref 1–1.03)
URINALYSIS CLARITY, POC: ABNORMAL
URINALYSIS COLOR, POC: YELLOW
UROBILINOGEN, POC: NORMAL

## 2023-12-07 ASSESSMENT — ENCOUNTER SYMPTOMS: CONSTIPATION: 1

## 2023-12-07 NOTE — PROGRESS NOTES
Chief Complaint   Patient presents with    Follow-up    Urinary Frequency at Night    Enuresis        /68   Pulse 53      PHQ-9 score is    Negative      1. \"Have you been to the ER, urgent care clinic since your last visit? Hospitalized since your last visit? \" No    2. \"Have you seen or consulted any other health care providers outside of the [address] [place] [address] since your last visit? \" No     3. For patients aged 43-73: Has the patient had a colonoscopy / FIT/ Cologuard? Yes - no Care Gap present      If the patient is female:    4. For patients aged 43-66: Has the patient had a mammogram within the past 2 years? Yes - no Care Gap present      5. For patients aged 21-65: Has the patient had a pap smear?  Yes - no Care Gap present

## 2023-12-07 NOTE — PROGRESS NOTES
HISTORY OF PRESENT ILLNESS  Rich Estevez is a 63 y.o. female   Patient came in today with urgency incontinence. Her incontinence is getting worse. Her residual today is 50 cc. She has been on Ditropan she has been on Myrbetriq she been on Estrace and she still cannot get the bathroom in time if she wets her self. She also wears diapers and very depressed. We talked about Botox. We told what the risk and benefits including the risk of retention. She would like to try it so we will set her up for that  1. Urge incontinence of urine  Overview:  Occasional urge incontinence. Denies stress incontinence. Nocturia x 2-3 and frequency x 5-7. On Estrace cream, Oxybutynin, and Mybetriq. Orders:  -     AMB POC URINALYSIS DIP STICK AUTO W/O MICRO  -     AMB POC PVR, JEFF,POST-VOID RES,US,NON-IMAGING  2. Urge incontinence  Overview:  Occasional urge incontinence. Denies stress incontinence. Nocturia x 2-3 and frequency x 5-7. On Estrace cream, Oxybutynin, and Mybetriq. 3. Frequency of micturition  4. Atrophic vaginitis        PAST MEDICAL HISTORY  PMHx (including negatives):  has a past medical history of Burning with urination, CAD (coronary artery disease), Diabetes ([address]), and Hypercholesterolemia. PSurgHx:  has a past surgical history that includes Carpal tunnel release (2007); Colonoscopy; Rotator cuff repair (Left, 11/06/2020); Coronary angioplasty with stent; and Colonoscopy (N/A, 10/11/2023). PSocHx:  reports that she quit smoking about 18 years ago. Her smoking use included cigarettes. She has never used smokeless tobacco. She reports current alcohol use. She reports that she does not use drugs. FamilyHX:   Family History   Problem Relation Age of Onset    Cancer Maternal Aunt         breast    Emphysema Father     Hypertension Father     Heart Disease Mother        ROS  Review of Systems   Gastrointestinal:  Positive for constipation. Genitourinary:  Positive for frequency and urgency.    All other

## 2023-12-08 ENCOUNTER — PREP FOR PROCEDURE (OUTPATIENT)
Age: 58
End: 2023-12-08

## 2023-12-08 DIAGNOSIS — N39.41 URGE URINARY INCONTINENCE: ICD-10-CM

## 2023-12-15 ENCOUNTER — HOSPITAL ENCOUNTER (OUTPATIENT)
Facility: HOSPITAL | Age: 58
Discharge: HOME | End: 2023-12-15
Attending: UROLOGY | Admitting: UROLOGY
Payer: MEDICARE

## 2023-12-15 ENCOUNTER — ANESTHESIA EVENT (OUTPATIENT)
Facility: HOSPITAL | Age: 58
End: 2023-12-15
Payer: MEDICARE

## 2023-12-15 ENCOUNTER — ANESTHESIA (OUTPATIENT)
Facility: HOSPITAL | Age: 58
End: 2023-12-15
Payer: MEDICARE

## 2023-12-15 VITALS
HEIGHT: 70 IN | DIASTOLIC BLOOD PRESSURE: 81 MMHG | TEMPERATURE: 97.8 F | RESPIRATION RATE: 18 BRPM | SYSTOLIC BLOOD PRESSURE: 178 MMHG | WEIGHT: 265 LBS | HEART RATE: 69 BPM | OXYGEN SATURATION: 99 % | BODY MASS INDEX: 37.94 KG/M2

## 2023-12-15 LAB
ANION GAP SERPL CALC-SCNC: 6 MMOL/L (ref 5–15)
APPEARANCE UR: ABNORMAL
BACTERIA URNS QL MICRO: ABNORMAL /HPF
BILIRUB UR QL: NEGATIVE
BUN SERPL-MCNC: 13 MG/DL (ref 6–20)
BUN/CREAT SERPL: 12 (ref 12–20)
CA-I BLD-MCNC: 8.6 MG/DL (ref 8.5–10.1)
CHLORIDE SERPL-SCNC: 108 MMOL/L (ref 97–108)
CO2 SERPL-SCNC: 27 MMOL/L (ref 21–32)
COLOR UR: ABNORMAL
CREAT SERPL-MCNC: 1.09 MG/DL (ref 0.55–1.02)
EKG ATRIAL RATE: 56 BPM
EKG DIAGNOSIS: NORMAL
EKG P AXIS: 51 DEGREES
EKG P-R INTERVAL: 176 MS
EKG Q-T INTERVAL: 414 MS
EKG QRS DURATION: 90 MS
EKG QTC CALCULATION (BAZETT): 399 MS
EKG R AXIS: 5 DEGREES
EKG T AXIS: 29 DEGREES
EKG VENTRICULAR RATE: 56 BPM
EPITH CASTS URNS QL MICRO: ABNORMAL /LPF
ERYTHROCYTE [DISTWIDTH] IN BLOOD BY AUTOMATED COUNT: 13 % (ref 11.5–14.5)
GLUCOSE BLD STRIP.AUTO-MCNC: 108 MG/DL (ref 65–100)
GLUCOSE SERPL-MCNC: 120 MG/DL (ref 65–100)
GLUCOSE UR STRIP.AUTO-MCNC: >1000 MG/DL
HCT VFR BLD AUTO: 42.2 % (ref 35–47)
HGB BLD-MCNC: 13.9 G/DL (ref 11.5–16)
HGB UR QL STRIP: NEGATIVE
KETONES UR QL STRIP.AUTO: NEGATIVE MG/DL
LEUKOCYTE ESTERASE UR QL STRIP.AUTO: NEGATIVE
MCH RBC QN AUTO: 27.6 PG (ref 26–34)
MCHC RBC AUTO-ENTMCNC: 32.9 G/DL (ref 30–36.5)
MCV RBC AUTO: 83.7 FL (ref 80–99)
NITRITE UR QL STRIP.AUTO: POSITIVE
NRBC # BLD: 0 K/UL (ref 0–0.01)
NRBC BLD-RTO: 0 PER 100 WBC
PERFORMED BY:: ABNORMAL
PH UR STRIP: 6
PLATELET # BLD AUTO: 230 K/UL (ref 150–400)
PMV BLD AUTO: 10.5 FL (ref 8.9–12.9)
POTASSIUM SERPL-SCNC: 4.1 MMOL/L (ref 3.5–5.1)
PROT UR STRIP-MCNC: NEGATIVE MG/DL
RBC # BLD AUTO: 5.04 M/UL (ref 3.8–5.2)
RBC #/AREA URNS HPF: ABNORMAL /HPF (ref 0–5)
SODIUM SERPL-SCNC: 141 MMOL/L (ref 136–145)
SP GR UR REFRACTOMETRY: 1.01 (ref 1–1.03)
UROBILINOGEN UR QL STRIP.AUTO: 1 EU/DL (ref 0.2–1)
WBC # BLD AUTO: 4.7 K/UL (ref 3.6–11)
WBC URNS QL MICRO: ABNORMAL /HPF (ref 0–4)

## 2023-12-15 PROCEDURE — 85027 COMPLETE CBC AUTOMATED: CPT

## 2023-12-15 PROCEDURE — 3700000000 HC ANESTHESIA ATTENDED CARE: Performed by: UROLOGY

## 2023-12-15 PROCEDURE — 2580000003 HC RX 258: Performed by: UROLOGY

## 2023-12-15 PROCEDURE — 2709999900 HC NON-CHARGEABLE SUPPLY: Performed by: UROLOGY

## 2023-12-15 PROCEDURE — 93005 ELECTROCARDIOGRAM TRACING: CPT | Performed by: UROLOGY

## 2023-12-15 PROCEDURE — 52287 CYSTOSCOPY CHEMODENERVATION: CPT | Performed by: UROLOGY

## 2023-12-15 PROCEDURE — 6360000002 HC RX W HCPCS: Performed by: UROLOGY

## 2023-12-15 PROCEDURE — 7100000010 HC PHASE II RECOVERY - FIRST 15 MIN: Performed by: UROLOGY

## 2023-12-15 PROCEDURE — 36415 COLL VENOUS BLD VENIPUNCTURE: CPT

## 2023-12-15 PROCEDURE — 87186 SC STD MICRODIL/AGAR DIL: CPT

## 2023-12-15 PROCEDURE — 82962 GLUCOSE BLOOD TEST: CPT

## 2023-12-15 PROCEDURE — 3700000001 HC ADD 15 MINUTES (ANESTHESIA): Performed by: UROLOGY

## 2023-12-15 PROCEDURE — 87077 CULTURE AEROBIC IDENTIFY: CPT

## 2023-12-15 PROCEDURE — 80048 BASIC METABOLIC PNL TOTAL CA: CPT

## 2023-12-15 PROCEDURE — 7100000001 HC PACU RECOVERY - ADDTL 15 MIN: Performed by: UROLOGY

## 2023-12-15 PROCEDURE — 7100000011 HC PHASE II RECOVERY - ADDTL 15 MIN: Performed by: UROLOGY

## 2023-12-15 PROCEDURE — 87086 URINE CULTURE/COLONY COUNT: CPT

## 2023-12-15 PROCEDURE — 2500000003 HC RX 250 WO HCPCS: Performed by: NURSE ANESTHETIST, CERTIFIED REGISTERED

## 2023-12-15 PROCEDURE — 81001 URINALYSIS AUTO W/SCOPE: CPT

## 2023-12-15 PROCEDURE — 3600000012 HC SURGERY LEVEL 2 ADDTL 15MIN: Performed by: UROLOGY

## 2023-12-15 PROCEDURE — 6360000002 HC RX W HCPCS: Performed by: NURSE ANESTHETIST, CERTIFIED REGISTERED

## 2023-12-15 PROCEDURE — 3600000002 HC SURGERY LEVEL 2 BASE: Performed by: UROLOGY

## 2023-12-15 PROCEDURE — 7100000000 HC PACU RECOVERY - FIRST 15 MIN: Performed by: UROLOGY

## 2023-12-15 RX ORDER — LABETALOL HYDROCHLORIDE 5 MG/ML
10 INJECTION, SOLUTION INTRAVENOUS
Status: DISCONTINUED | OUTPATIENT
Start: 2023-12-15 | End: 2023-12-15 | Stop reason: HOSPADM

## 2023-12-15 RX ORDER — EPHEDRINE SULFATE 50 MG/ML
INJECTION INTRAVENOUS PRN
Status: DISCONTINUED | OUTPATIENT
Start: 2023-12-15 | End: 2023-12-15 | Stop reason: SDUPTHER

## 2023-12-15 RX ORDER — DIPHENHYDRAMINE HYDROCHLORIDE 50 MG/ML
12.5 INJECTION INTRAMUSCULAR; INTRAVENOUS
Status: DISCONTINUED | OUTPATIENT
Start: 2023-12-15 | End: 2023-12-15 | Stop reason: HOSPADM

## 2023-12-15 RX ORDER — GLYCOPYRROLATE 0.2 MG/ML
INJECTION INTRAMUSCULAR; INTRAVENOUS PRN
Status: DISCONTINUED | OUTPATIENT
Start: 2023-12-15 | End: 2023-12-15 | Stop reason: SDUPTHER

## 2023-12-15 RX ORDER — SODIUM CHLORIDE, SODIUM LACTATE, POTASSIUM CHLORIDE, CALCIUM CHLORIDE 600; 310; 30; 20 MG/100ML; MG/100ML; MG/100ML; MG/100ML
INJECTION, SOLUTION INTRAVENOUS CONTINUOUS
Status: DISCONTINUED | OUTPATIENT
Start: 2023-12-15 | End: 2023-12-15 | Stop reason: HOSPADM

## 2023-12-15 RX ORDER — SODIUM CHLORIDE 0.9 % (FLUSH) 0.9 %
5-40 SYRINGE (ML) INJECTION EVERY 12 HOURS SCHEDULED
Status: DISCONTINUED | OUTPATIENT
Start: 2023-12-15 | End: 2023-12-15 | Stop reason: HOSPADM

## 2023-12-15 RX ORDER — METOCLOPRAMIDE HYDROCHLORIDE 5 MG/ML
10 INJECTION INTRAMUSCULAR; INTRAVENOUS
Status: DISCONTINUED | OUTPATIENT
Start: 2023-12-15 | End: 2023-12-15 | Stop reason: HOSPADM

## 2023-12-15 RX ORDER — SODIUM CHLORIDE, SODIUM LACTATE, POTASSIUM CHLORIDE, CALCIUM CHLORIDE 600; 310; 30; 20 MG/100ML; MG/100ML; MG/100ML; MG/100ML
INJECTION, SOLUTION INTRAVENOUS ONCE
Status: DISCONTINUED | OUTPATIENT
Start: 2023-12-15 | End: 2023-12-15 | Stop reason: HOSPADM

## 2023-12-15 RX ORDER — DEXTROSE MONOHYDRATE 100 MG/ML
INJECTION, SOLUTION INTRAVENOUS CONTINUOUS PRN
Status: DISCONTINUED | OUTPATIENT
Start: 2023-12-15 | End: 2023-12-15 | Stop reason: HOSPADM

## 2023-12-15 RX ORDER — IPRATROPIUM BROMIDE AND ALBUTEROL SULFATE 2.5; .5 MG/3ML; MG/3ML
1 SOLUTION RESPIRATORY (INHALATION)
Status: DISCONTINUED | OUTPATIENT
Start: 2023-12-15 | End: 2023-12-15 | Stop reason: HOSPADM

## 2023-12-15 RX ORDER — PHENAZOPYRIDINE HYDROCHLORIDE 100 MG/1
100 TABLET, FILM COATED ORAL 3 TIMES DAILY PRN
Qty: 20 TABLET | Refills: 0 | Status: SHIPPED | OUTPATIENT
Start: 2023-12-15 | End: 2023-12-25

## 2023-12-15 RX ORDER — SULFAMETHOXAZOLE AND TRIMETHOPRIM 800; 160 MG/1; MG/1
1 TABLET ORAL 2 TIMES DAILY
Qty: 10 TABLET | Refills: 0 | Status: SHIPPED | OUTPATIENT
Start: 2023-12-15 | End: 2023-12-20

## 2023-12-15 RX ORDER — PROPOFOL 10 MG/ML
INJECTION, EMULSION INTRAVENOUS PRN
Status: DISCONTINUED | OUTPATIENT
Start: 2023-12-15 | End: 2023-12-15 | Stop reason: SDUPTHER

## 2023-12-15 RX ORDER — FENTANYL CITRATE 50 UG/ML
INJECTION, SOLUTION INTRAMUSCULAR; INTRAVENOUS PRN
Status: DISCONTINUED | OUTPATIENT
Start: 2023-12-15 | End: 2023-12-15 | Stop reason: SDUPTHER

## 2023-12-15 RX ORDER — LORAZEPAM 2 MG/ML
0.5 INJECTION INTRAMUSCULAR
Status: DISCONTINUED | OUTPATIENT
Start: 2023-12-15 | End: 2023-12-15 | Stop reason: HOSPADM

## 2023-12-15 RX ORDER — MIDAZOLAM HYDROCHLORIDE 1 MG/ML
INJECTION INTRAMUSCULAR; INTRAVENOUS PRN
Status: DISCONTINUED | OUTPATIENT
Start: 2023-12-15 | End: 2023-12-15 | Stop reason: SDUPTHER

## 2023-12-15 RX ORDER — OXYCODONE HYDROCHLORIDE 5 MG/1
5 TABLET ORAL PRN
Status: DISCONTINUED | OUTPATIENT
Start: 2023-12-15 | End: 2023-12-15 | Stop reason: HOSPADM

## 2023-12-15 RX ORDER — LIDOCAINE HYDROCHLORIDE 20 MG/ML
INJECTION, SOLUTION EPIDURAL; INFILTRATION; INTRACAUDAL; PERINEURAL PRN
Status: DISCONTINUED | OUTPATIENT
Start: 2023-12-15 | End: 2023-12-15 | Stop reason: SDUPTHER

## 2023-12-15 RX ORDER — ONDANSETRON 2 MG/ML
4 INJECTION INTRAMUSCULAR; INTRAVENOUS
Status: DISCONTINUED | OUTPATIENT
Start: 2023-12-15 | End: 2023-12-15 | Stop reason: HOSPADM

## 2023-12-15 RX ORDER — FENTANYL CITRATE 50 UG/ML
50 INJECTION, SOLUTION INTRAMUSCULAR; INTRAVENOUS EVERY 5 MIN PRN
Status: DISCONTINUED | OUTPATIENT
Start: 2023-12-15 | End: 2023-12-15 | Stop reason: HOSPADM

## 2023-12-15 RX ORDER — SODIUM CHLORIDE 0.9 % (FLUSH) 0.9 %
5-40 SYRINGE (ML) INJECTION PRN
Status: DISCONTINUED | OUTPATIENT
Start: 2023-12-15 | End: 2023-12-15 | Stop reason: HOSPADM

## 2023-12-15 RX ORDER — HYDRALAZINE HYDROCHLORIDE 20 MG/ML
10 INJECTION INTRAMUSCULAR; INTRAVENOUS
Status: DISCONTINUED | OUTPATIENT
Start: 2023-12-15 | End: 2023-12-15 | Stop reason: HOSPADM

## 2023-12-15 RX ORDER — OXYCODONE HYDROCHLORIDE 5 MG/1
10 TABLET ORAL PRN
Status: DISCONTINUED | OUTPATIENT
Start: 2023-12-15 | End: 2023-12-15 | Stop reason: HOSPADM

## 2023-12-15 RX ORDER — MEPERIDINE HYDROCHLORIDE 25 MG/ML
12.5 INJECTION INTRAMUSCULAR; INTRAVENOUS; SUBCUTANEOUS EVERY 5 MIN PRN
Status: DISCONTINUED | OUTPATIENT
Start: 2023-12-15 | End: 2023-12-15 | Stop reason: HOSPADM

## 2023-12-15 RX ORDER — LIDOCAINE 4 G/G
1 PATCH TOPICAL AS NEEDED
Status: DISCONTINUED | OUTPATIENT
Start: 2023-12-15 | End: 2023-12-15 | Stop reason: HOSPADM

## 2023-12-15 RX ORDER — SODIUM CHLORIDE 9 MG/ML
INJECTION, SOLUTION INTRAVENOUS PRN
Status: DISCONTINUED | OUTPATIENT
Start: 2023-12-15 | End: 2023-12-15 | Stop reason: HOSPADM

## 2023-12-15 RX ORDER — HYDROMORPHONE HYDROCHLORIDE 1 MG/ML
0.5 INJECTION, SOLUTION INTRAMUSCULAR; INTRAVENOUS; SUBCUTANEOUS EVERY 5 MIN PRN
Status: DISCONTINUED | OUTPATIENT
Start: 2023-12-15 | End: 2023-12-15 | Stop reason: HOSPADM

## 2023-12-15 RX ADMIN — LIDOCAINE HYDROCHLORIDE 100 MG: 20 INJECTION, SOLUTION EPIDURAL; INFILTRATION; INTRACAUDAL; PERINEURAL at 09:02

## 2023-12-15 RX ADMIN — FENTANYL CITRATE 50 MCG: 50 INJECTION, SOLUTION INTRAMUSCULAR; INTRAVENOUS at 09:02

## 2023-12-15 RX ADMIN — PROPOFOL 200 MG: 10 INJECTION, EMULSION INTRAVENOUS at 09:02

## 2023-12-15 RX ADMIN — GLYCOPYRROLATE 0.2 MG: 0.2 INJECTION, SOLUTION INTRAMUSCULAR; INTRAVENOUS at 08:52

## 2023-12-15 RX ADMIN — EPHEDRINE SULFATE 10 MG: 50 INJECTION INTRAVENOUS at 09:14

## 2023-12-15 RX ADMIN — SODIUM CHLORIDE, POTASSIUM CHLORIDE, SODIUM LACTATE AND CALCIUM CHLORIDE: 600; 310; 30; 20 INJECTION, SOLUTION INTRAVENOUS at 08:03

## 2023-12-15 RX ADMIN — CEFAZOLIN SODIUM 3000 MG: 1 INJECTION, POWDER, FOR SOLUTION INTRAMUSCULAR; INTRAVENOUS at 08:52

## 2023-12-15 RX ADMIN — FENTANYL CITRATE 50 MCG: 50 INJECTION, SOLUTION INTRAMUSCULAR; INTRAVENOUS at 09:13

## 2023-12-15 RX ADMIN — MIDAZOLAM HYDROCHLORIDE 2 MG: 2 INJECTION, SOLUTION INTRAMUSCULAR; INTRAVENOUS at 08:52

## 2023-12-15 ASSESSMENT — PAIN - FUNCTIONAL ASSESSMENT
PAIN_FUNCTIONAL_ASSESSMENT: NONE - DENIES PAIN
PAIN_FUNCTIONAL_ASSESSMENT: 0-10

## 2023-12-15 NOTE — ANESTHESIA PRE PROCEDURE
Depression F32. A    Anxiety F41.9    Moderate recurrent major depression (HCC) F33.1    Idiopathic osteoarthritis M19.90    Type 2 diabetes mellitus with diabetic neuropathy (HCC) E11.40    Nontraumatic incomplete tear of left rotator cuff M75.112    Cataract H26.9    Overactive bladder N32.81    Arteriosclerosis of coronary artery I25.10    Lumbar scoliosis M41.9    Essential hypertension with goal blood pressure less than 140/90 I10    Hyperlipidemia, mixed E78.2    Severe obesity (BMI 35.0-39. 9) with comorbidity (HCC) E66.01    Degeneration of intervertebral disc of lumbar region M51.36    Uterovaginal prolapse N81.4    Urge incontinence N39.41    Urinary tract infection without hematuria N39.0    Atrophic vaginitis N95.2    Frequency of micturition R35.0    Cystitis N30.90    Urge urinary incontinence N39.41       Past Medical History:        Diagnosis Date    Burning with urination     CAD (coronary artery disease)     Dr Lj Tejada    Diabetes [facility])     Hypercholesterolemia     Sleep apnea     no cpap       Past Surgical History:        Procedure Laterality Date    CARPAL TUNNEL RELEASE      COLONOSCOPY      needs soon, incomplete    COLONOSCOPY N/A 10/11/2023    COLONOSCOPY performed by Brittany Moncada MD at [address]      x 4    ROTATOR CUFF REPAIR Left 2020       Social History:    Social History     Tobacco Use    Smoking status: Former     Types: Cigarettes     Quit date: 2005     Years since quittin.9    Smokeless tobacco: Never   Substance Use Topics    Alcohol use: Yes     Comment: occ                                Counseling given: Not Answered      Vital Signs (Current):   Vitals:    23 1318   Weight: 120.2 kg (265 lb)   Height: 1.778 m (5' 10\")                                              BP Readings from Last 3 Encounters:   23 117/68   10/11/23 (!) 176/88   23 (!) 98/50       NPO Status:

## 2023-12-15 NOTE — OP NOTE
Operative Note      Patient: Panfilo Magaña  YOB: 1965  MRN: [id number]    Date of Procedure: 12/15/2023    Pre-Op Diagnosis Codes:     * Urge urinary incontinence [N39.41]    Post-Op Diagnosis: Same       Procedure(s):  CYSTOURETHROSCOPY WITH INJECTION OF BOTOX INTO THE BLADDER    Surgeon(s):  Estephania Barroso MD    Assistant:   * No surgical staff found *    Anesthesia: General    Estimated Blood Loss (mL): Minimal    Complications: None    Specimens:   * No specimens in log *    Implants:  * No implants in log *      Drains: * No LDAs found *    Findings: cystocele        Detailed Description of Procedure:   Patient was prepped and draped usual sterile fashion after undergoing anesthesia placed lithotomy position. Timeout was performed preoperative antibiotics were given SCDs were applied  Patient was scope with 21 Mongolian cystoscope. Patient has cystocele and some inflammatory bumps in the trigone. I then injected 100 units of Botox and 10 cc normal saline half cc increments from the trigone back staying away from the orifices.   Then into the bladder and put lidocaine per urethra took her off the table to recovery area without complication    Electronically signed by Estephania Barroso MD on 12/15/2023 at 9:30 AM

## 2023-12-15 NOTE — PROGRESS NOTES
Pt upset stating that anesthesia doctor was not in the room with her during her procedure and also stated that Dr Sabina Lo was not in the room. Pt also stated that she was not even sure Dr Sabina Lo did the surgery. I offered to call the anesthesia doctor and dr Sabina Lo to speak with her pt refused both and stated that she was ready to leave. Pt DIL waiting in the car at front door pt insisting to leave. Pt escorted out via wheelchair to front entrance and dc home with DIL.

## 2023-12-15 NOTE — ANESTHESIA POSTPROCEDURE EVALUATION
Department of Anesthesiology  Postprocedure Note    Patient: Gustavus Najjar  MRN: [id number]  YOB: 1965  Date of evaluation: 12/15/2023    Procedure Summary       Date: 12/15/23 Room / Location: [facility] / [facility]    Anesthesia Start: 0852 Anesthesia Stop: 0935    Procedure: CYSTOURETHROSCOPY WITH INJECTION OF BOTOX INTO THE BLADDER (Bladder) Diagnosis:       Urge urinary incontinence      (Urge urinary incontinence [N39.41])    Surgeons: Negra Pineda MD Responsible Provider: Devonte Vasques MD    Anesthesia Type: General ASA Status: 3            Anesthesia Type: General    Joe Phase I: Joe Score: 9    Joe Phase II:      Anesthesia Post Evaluation    Patient location during evaluation: PACU  Patient participation: complete - patient participated  Level of consciousness: sleepy but conscious  Pain score: 0  Airway patency: patent  Nausea & Vomiting: no nausea and no vomiting  Cardiovascular status: hemodynamically stable  Respiratory status: acceptable  Hydration status: stable  Multimodal analgesia pain management approach    No notable events documented.

## 2023-12-15 NOTE — DISCHARGE INSTRUCTIONS
Post Botox Injection      After the treatment procedure: You may resume your usual activity and diet  Continue taking the antibiotic that was prescribed to you until finished  You should not experience significant pain, although it may sting or burn when you urinate the first few times. You may also see some blood in the urine right after treatment. If either of these symptoms persist please notify doctor.     Please call your doctor if you experience any unusual symptoms, especially:    Bleeding that is more than a very small amount or last longer than 24 hours  Signs of infection (fever over 100.4, chills, frequent urination, the strong need to urinate right away [urgency] or pain on urination)  The inability to pass urine of fully empty your bladder                             *Don't forget follow up Appointment*

## 2023-12-17 LAB
BACTERIA SPEC CULT: ABNORMAL
COLONY COUNT, CNT: ABNORMAL
Lab: ABNORMAL

## 2024-01-02 ENCOUNTER — NURSE ONLY (OUTPATIENT)
Age: 59
End: 2024-01-02

## 2024-01-02 DIAGNOSIS — E11.40 TYPE 2 DIABETES MELLITUS WITH DIABETIC NEUROPATHY, WITHOUT LONG-TERM CURRENT USE OF INSULIN (HCC): ICD-10-CM

## 2024-01-02 LAB
ANION GAP SERPL CALC-SCNC: 6 MMOL/L (ref 5–15)
BUN SERPL-MCNC: 9 MG/DL (ref 6–20)
BUN/CREAT SERPL: 9 (ref 12–20)
CALCIUM SERPL-MCNC: 8.5 MG/DL (ref 8.5–10.1)
CHLORIDE SERPL-SCNC: 108 MMOL/L (ref 97–108)
CO2 SERPL-SCNC: 26 MMOL/L (ref 21–32)
CREAT SERPL-MCNC: 1 MG/DL (ref 0.55–1.02)
EST. AVERAGE GLUCOSE BLD GHB EST-MCNC: 123 MG/DL
GLUCOSE SERPL-MCNC: 100 MG/DL (ref 65–100)
HBA1C MFR BLD: 5.9 % (ref 4–5.6)
POTASSIUM SERPL-SCNC: 4 MMOL/L (ref 3.5–5.1)
SODIUM SERPL-SCNC: 140 MMOL/L (ref 136–145)

## 2024-01-04 ENCOUNTER — OFFICE VISIT (OUTPATIENT)
Age: 59
End: 2024-01-04
Payer: MEDICARE

## 2024-01-04 VITALS — DIASTOLIC BLOOD PRESSURE: 73 MMHG | SYSTOLIC BLOOD PRESSURE: 119 MMHG | HEART RATE: 60 BPM

## 2024-01-04 DIAGNOSIS — N95.2 ATROPHIC VAGINITIS: ICD-10-CM

## 2024-01-04 DIAGNOSIS — N95.2 POSTMENOPAUSAL ATROPHIC VAGINITIS: ICD-10-CM

## 2024-01-04 DIAGNOSIS — N39.41 URGE INCONTINENCE OF URINE: ICD-10-CM

## 2024-01-04 DIAGNOSIS — R35.0 FREQUENCY OF MICTURITION: ICD-10-CM

## 2024-01-04 DIAGNOSIS — N39.41 URGE INCONTINENCE: Primary | ICD-10-CM

## 2024-01-04 LAB
BILIRUBIN, URINE, POC: NEGATIVE
BLOOD URINE, POC: ABNORMAL
GLUCOSE URINE, POC: 500
KETONES, URINE, POC: NEGATIVE
LEUKOCYTE ESTERASE, URINE, POC: NEGATIVE
NITRITE, URINE, POC: NEGATIVE
PH, URINE, POC: 6.5 (ref 4.6–8)
PROTEIN,URINE, POC: NEGATIVE
PVR, POC: NORMAL CC
SPECIFIC GRAVITY, URINE, POC: 1.02 (ref 1–1.03)
URINALYSIS CLARITY, POC: ABNORMAL
URINALYSIS COLOR, POC: YELLOW
UROBILINOGEN, POC: NORMAL

## 2024-01-04 PROCEDURE — 3074F SYST BP LT 130 MM HG: CPT | Performed by: UROLOGY

## 2024-01-04 PROCEDURE — 99214 OFFICE O/P EST MOD 30 MIN: CPT | Performed by: UROLOGY

## 2024-01-04 PROCEDURE — 81001 URINALYSIS AUTO W/SCOPE: CPT | Performed by: UROLOGY

## 2024-01-04 PROCEDURE — 1036F TOBACCO NON-USER: CPT | Performed by: UROLOGY

## 2024-01-04 PROCEDURE — G8417 CALC BMI ABV UP PARAM F/U: HCPCS | Performed by: UROLOGY

## 2024-01-04 PROCEDURE — 3017F COLORECTAL CA SCREEN DOC REV: CPT | Performed by: UROLOGY

## 2024-01-04 PROCEDURE — G8484 FLU IMMUNIZE NO ADMIN: HCPCS | Performed by: UROLOGY

## 2024-01-04 PROCEDURE — 51798 US URINE CAPACITY MEASURE: CPT | Performed by: UROLOGY

## 2024-01-04 PROCEDURE — G8427 DOCREV CUR MEDS BY ELIG CLIN: HCPCS | Performed by: UROLOGY

## 2024-01-04 PROCEDURE — 3078F DIAST BP <80 MM HG: CPT | Performed by: UROLOGY

## 2024-01-04 NOTE — PROGRESS NOTES
HISTORY OF PRESENT ILLNESS  Cece Navarro is a 58 y.o. female   Patient returns today and her urgency and frequency is much better.  She does have to get the bathroom very quickly.  She does have residual 182 cc.  She does have mild problems emptying her bladder.  She double voids she does fine.    We talked about this with the help with the Botox doses but her urgency frequency is now gone urgency incontinence is gone.  Plan to see her back in 3 to 6 months and reevaluate.  1. Urge incontinence  Overview:  urge incontinence, use of depends. Denies stress incontinence.  Nocturia x 2-3 and frequency x 5-7.  On Estrace cream, Oxybutynin, and Mybetriq were not effective.    12/15/2023-CYSTOURETHROSCOPY WITH INJECTION OF BOTOX INTO THE BLADDER     Orders:  -     AMB POC URINALYSIS DIP STICK AUTO W/ MICRO  -     AMB POC PVR, JEFF,POST-VOID RES,US,NON-IMAGING  2. Urge incontinence of urine  3. Atrophic vaginitis  4. Frequency of micturition  5. Postmenopausal atrophic vaginitis        PAST MEDICAL HISTORY  PMHx (including negatives):  has a past medical history of Burning with urination, CAD (coronary artery disease), Diabetes (HCC), Hypercholesterolemia, and Sleep apnea.   PSurgHx:  has a past surgical history that includes Carpal tunnel release (2007); Colonoscopy; Rotator cuff repair (Left, 11/06/2020); Coronary angioplasty with stent; Colonoscopy (N/A, 10/11/2023); and Cystoscopy (N/A, 12/15/2023).  PSocHx:  reports that she quit smoking about 19 years ago. Her smoking use included cigarettes. She has never used smokeless tobacco. She reports current alcohol use. She reports that she does not use drugs.   FamilyHX:   Family History   Problem Relation Age of Onset    Cancer Maternal Aunt         breast    Emphysema Father     Hypertension Father     Heart Disease Mother        ROS  Review of Systems   All other systems reviewed and are negative.        PHYSICAL EXAM  Physical Exam  Vitals and nursing note reviewed.

## 2024-01-04 NOTE — PROGRESS NOTES
Chief Complaint   Patient presents with    Follow-up     After cysto        /73   Pulse 60      PHQ-9 score is    Negative      1. \"Have you been to the ER, urgent care clinic since your last visit?  Hospitalized since your last visit?\" No    2. \"Have you seen or consulted any other health care providers outside of the [facility] since your last visit?\" No     3. For patients aged 45-75: Has the patient had a colonoscopy / FIT/ Cologuard? Yes - no Care Gap present      If the patient is female:    4. For patients aged 40-74: Has the patient had a mammogram within the past 2 years? Yes - no Care Gap present      5. For patients aged 21-65: Has the patient had a pap smear? Yes - no Care Gap present

## 2024-01-08 ENCOUNTER — OFFICE VISIT (OUTPATIENT)
Age: 59
End: 2024-01-08

## 2024-01-08 VITALS
RESPIRATION RATE: 17 BRPM | HEIGHT: 70 IN | OXYGEN SATURATION: 96 % | WEIGHT: 271.5 LBS | SYSTOLIC BLOOD PRESSURE: 119 MMHG | DIASTOLIC BLOOD PRESSURE: 66 MMHG | BODY MASS INDEX: 38.87 KG/M2 | HEART RATE: 64 BPM | TEMPERATURE: 97.5 F

## 2024-01-08 DIAGNOSIS — E11.40 TYPE 2 DIABETES MELLITUS WITH DIABETIC NEUROPATHY, WITHOUT LONG-TERM CURRENT USE OF INSULIN (HCC): ICD-10-CM

## 2024-01-08 DIAGNOSIS — E11.40 TYPE 2 DIABETES MELLITUS WITH DIABETIC NEUROPATHY, WITHOUT LONG-TERM CURRENT USE OF INSULIN (HCC): Primary | ICD-10-CM

## 2024-01-08 DIAGNOSIS — E11.65 TYPE 2 DIABETES MELLITUS WITH HYPERGLYCEMIA, WITHOUT LONG-TERM CURRENT USE OF INSULIN (HCC): ICD-10-CM

## 2024-01-08 DIAGNOSIS — E78.2 HYPERLIPIDEMIA, MIXED: ICD-10-CM

## 2024-01-08 DIAGNOSIS — I10 ESSENTIAL HYPERTENSION WITH GOAL BLOOD PRESSURE LESS THAN 140/90: ICD-10-CM

## 2024-01-08 RX ORDER — ALBUTEROL SULFATE 90 UG/1
2 AEROSOL, METERED RESPIRATORY (INHALATION) PRN
COMMUNITY
Start: 2023-12-20

## 2024-01-08 RX ORDER — FLUTICASONE PROPIONATE AND SALMETEROL 100; 50 UG/1; UG/1
1 POWDER RESPIRATORY (INHALATION) 2 TIMES DAILY
COMMUNITY
Start: 2023-12-14

## 2024-01-08 RX ORDER — DULAGLUTIDE 4.5 MG/.5ML
4.5 INJECTION, SOLUTION SUBCUTANEOUS
Qty: 6 ML | Refills: 3 | Status: SHIPPED | OUTPATIENT
Start: 2024-01-08

## 2024-01-08 NOTE — PROGRESS NOTES
Cece Navarro is a 58 y.o. female here for   Chief Complaint   Patient presents with    Diabetes     HTN   Hyperlipidemia       1. Have you been to the ER, urgent care clinic since your last visit?  Hospitalized since your last visit? -no     2. Have you seen or consulted any other health care providers outside of the [facility] since your last visit?  Include any pap smears or colon screening.- no

## 2024-01-08 NOTE — PROGRESS NOTES
[facility] DIABETES AND ENDOCRINOLOGY                 Mattie Quezada MD            Patient Information   Date:3/14/2023   Name : Cece Navarro 57 y.o.       YOB: 1965           Referred by: Ashwin Orellana MD           History of Present Illness: Cece Navarro is a 57 y.o. female here  for fu of Type 2 Diabetes Mellitus dx at age of 20 years  Dx 1986 at 19 years of age   She is on Trulicity, tolerating well  on Jardiance   Hydrating well  No severe hypoglycemia       Prior history   She was referred by Ashwin Orellana MD for evaluation and management of diabetes.            Reviewed and updated this visit by clinical staff:   Tobacco  Allergies  Meds  Problems  Med Hx  Surg Hx  Fam Hx          Physical Examination:      General: pleasant, no distress, good eye contact    HEENT: no exophthalmos, no periorbital edema, EOMI    Neck: No thyromegaly    CVS: S1-S2 regular    RS: Normal respiratory effort    Musculoskeletal: no tremors    Neurological: alert and oriented    Psychiatric: normal mood and affect    Skin: Normal color      Diabetic foot exam ; August 2023       H/o partial or complete amputation of foot : No   H/o previous foot ulceration : No   H/o pre - ulcerative callus : No   H/o peripheral neuropathy and callus : No   H/o poor circulation  : No   Foot deformity : hammer toe       Data Reviewed:          Lab Results   Component Value Date    GFRAA 77 11/23/2021        Lab Results   Component Value Date    LABA1C 5.9 (H) 01/02/2024    LABA1C 5.5 08/21/2023    LABA1C 6.1 (H) 12/07/2022         Lab Results   Component Value Date     01/02/2024    K 4.0 01/02/2024     01/02/2024    CO2 26 01/02/2024    BUN 9 01/02/2024    CREATININE 1.00 01/02/2024    GFRAA 77 11/23/2021    GLUCOSE 100 01/02/2024    CALCIUM 8.5 01/02/2024    MALBCR 7 06/28/2022                       Assessment/Plan:      1. Type 2 diabetes mellitus with diabetic neuropathy, without

## 2024-03-18 DIAGNOSIS — E11.40 TYPE 2 DIABETES MELLITUS WITH DIABETIC NEUROPATHY, WITHOUT LONG-TERM CURRENT USE OF INSULIN (HCC): ICD-10-CM

## 2024-03-18 RX ORDER — DULAGLUTIDE 4.5 MG/.5ML
4.5 INJECTION, SOLUTION SUBCUTANEOUS
Qty: 6 ML | Refills: 3 | Status: SHIPPED | OUTPATIENT
Start: 2024-03-18

## 2024-04-09 ENCOUNTER — NURSE ONLY (OUTPATIENT)
Age: 59
End: 2024-04-09

## 2024-04-09 DIAGNOSIS — I10 ESSENTIAL HYPERTENSION WITH GOAL BLOOD PRESSURE LESS THAN 140/90: ICD-10-CM

## 2024-04-09 DIAGNOSIS — E11.40 TYPE 2 DIABETES MELLITUS WITH DIABETIC NEUROPATHY, WITHOUT LONG-TERM CURRENT USE OF INSULIN (HCC): ICD-10-CM

## 2024-04-09 DIAGNOSIS — E78.2 HYPERLIPIDEMIA, MIXED: ICD-10-CM

## 2024-04-09 LAB
ANION GAP SERPL CALC-SCNC: 6 MMOL/L (ref 5–15)
BUN SERPL-MCNC: 14 MG/DL (ref 6–20)
BUN/CREAT SERPL: 14 (ref 12–20)
CALCIUM SERPL-MCNC: 8.9 MG/DL (ref 8.5–10.1)
CHLORIDE SERPL-SCNC: 111 MMOL/L (ref 97–108)
CO2 SERPL-SCNC: 27 MMOL/L (ref 21–32)
CREAT SERPL-MCNC: 1 MG/DL (ref 0.55–1.02)
CREAT UR-MCNC: 108 MG/DL
EST. AVERAGE GLUCOSE BLD GHB EST-MCNC: 126 MG/DL
GLUCOSE SERPL-MCNC: 96 MG/DL (ref 65–100)
HBA1C MFR BLD: 6 % (ref 4–5.6)
LDLC SERPL DIRECT ASSAY-MCNC: 46 MG/DL (ref 0–100)
MICROALBUMIN UR-MCNC: 0.87 MG/DL
MICROALBUMIN/CREAT UR-RTO: 8 MG/G (ref 0–30)
POTASSIUM SERPL-SCNC: 4.3 MMOL/L (ref 3.5–5.1)
SODIUM SERPL-SCNC: 144 MMOL/L (ref 136–145)

## 2024-04-16 ENCOUNTER — OFFICE VISIT (OUTPATIENT)
Age: 59
End: 2024-04-16
Payer: MEDICARE

## 2024-04-16 VITALS
SYSTOLIC BLOOD PRESSURE: 121 MMHG | BODY MASS INDEX: 39.51 KG/M2 | WEIGHT: 276 LBS | DIASTOLIC BLOOD PRESSURE: 64 MMHG | TEMPERATURE: 97.5 F | RESPIRATION RATE: 12 BRPM | OXYGEN SATURATION: 98 % | HEART RATE: 60 BPM | HEIGHT: 70 IN

## 2024-04-16 DIAGNOSIS — I10 ESSENTIAL HYPERTENSION WITH GOAL BLOOD PRESSURE LESS THAN 140/90: ICD-10-CM

## 2024-04-16 DIAGNOSIS — E11.65 TYPE 2 DIABETES MELLITUS WITH HYPERGLYCEMIA, WITHOUT LONG-TERM CURRENT USE OF INSULIN (HCC): ICD-10-CM

## 2024-04-16 DIAGNOSIS — E78.2 HYPERLIPIDEMIA, MIXED: ICD-10-CM

## 2024-04-16 DIAGNOSIS — E11.40 TYPE 2 DIABETES MELLITUS WITH DIABETIC NEUROPATHY, WITHOUT LONG-TERM CURRENT USE OF INSULIN (HCC): Primary | ICD-10-CM

## 2024-04-16 PROCEDURE — 3078F DIAST BP <80 MM HG: CPT | Performed by: INTERNAL MEDICINE

## 2024-04-16 PROCEDURE — G2211 COMPLEX E/M VISIT ADD ON: HCPCS | Performed by: INTERNAL MEDICINE

## 2024-04-16 PROCEDURE — 1036F TOBACCO NON-USER: CPT | Performed by: INTERNAL MEDICINE

## 2024-04-16 PROCEDURE — 3074F SYST BP LT 130 MM HG: CPT | Performed by: INTERNAL MEDICINE

## 2024-04-16 PROCEDURE — G8417 CALC BMI ABV UP PARAM F/U: HCPCS | Performed by: INTERNAL MEDICINE

## 2024-04-16 PROCEDURE — 3044F HG A1C LEVEL LT 7.0%: CPT | Performed by: INTERNAL MEDICINE

## 2024-04-16 PROCEDURE — G8427 DOCREV CUR MEDS BY ELIG CLIN: HCPCS | Performed by: INTERNAL MEDICINE

## 2024-04-16 PROCEDURE — 3017F COLORECTAL CA SCREEN DOC REV: CPT | Performed by: INTERNAL MEDICINE

## 2024-04-16 PROCEDURE — 2022F DILAT RTA XM EVC RTNOPTHY: CPT | Performed by: INTERNAL MEDICINE

## 2024-04-16 PROCEDURE — 99214 OFFICE O/P EST MOD 30 MIN: CPT | Performed by: INTERNAL MEDICINE

## 2024-04-16 RX ORDER — LINACLOTIDE 290 UG/1
CAPSULE, GELATIN COATED ORAL
COMMUNITY
Start: 2024-03-11

## 2024-04-16 RX ORDER — DULAGLUTIDE 4.5 MG/.5ML
4.5 INJECTION, SOLUTION SUBCUTANEOUS
Qty: 6 ML | Refills: 3 | Status: SHIPPED | OUTPATIENT
Start: 2024-04-16
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
## 2024-05-22 ENCOUNTER — HOSPITAL ENCOUNTER (EMERGENCY)
Facility: HOSPITAL | Age: 59
Discharge: HOME | End: 2024-05-22
Payer: MEDICARE

## 2024-05-22 VITALS
HEIGHT: 70 IN | SYSTOLIC BLOOD PRESSURE: 147 MMHG | TEMPERATURE: 98.9 F | HEART RATE: 58 BPM | OXYGEN SATURATION: 99 % | BODY MASS INDEX: 37.22 KG/M2 | RESPIRATION RATE: 18 BRPM | DIASTOLIC BLOOD PRESSURE: 71 MMHG | WEIGHT: 260 LBS

## 2024-05-22 DIAGNOSIS — K08.89 PAIN, DENTAL: Primary | ICD-10-CM

## 2024-05-22 DIAGNOSIS — K04.7 DENTAL ABSCESS: ICD-10-CM

## 2024-05-22 PROCEDURE — 6370000000 HC RX 637 (ALT 250 FOR IP)

## 2024-05-22 PROCEDURE — 99283 EMERGENCY DEPT VISIT LOW MDM: CPT

## 2024-05-22 RX ORDER — AMOXICILLIN AND CLAVULANATE POTASSIUM 875; 125 MG/1; MG/1
1 TABLET, FILM COATED ORAL 2 TIMES DAILY
Qty: 20 TABLET | Refills: 0 | Status: SHIPPED | OUTPATIENT
Start: 2024-05-22 | End: 2024-06-01

## 2024-05-22 RX ORDER — KETOROLAC TROMETHAMINE 10 MG/1
10 TABLET, FILM COATED ORAL EVERY 6 HOURS PRN
Qty: 20 TABLET | Refills: 0 | Status: SHIPPED | OUTPATIENT
Start: 2024-05-22 | End: 2024-05-27

## 2024-05-22 RX ORDER — DIPHENHYDRAMINE HCL 12.5MG/5ML
12.5 LIQUID (ML) ORAL
Status: COMPLETED | OUTPATIENT
Start: 2024-05-22 | End: 2024-05-22

## 2024-05-22 RX ORDER — LIDOCAINE HYDROCHLORIDE 20 MG/ML
15 SOLUTION OROPHARYNGEAL
Status: COMPLETED | OUTPATIENT
Start: 2024-05-22 | End: 2024-05-22

## 2024-05-22 RX ORDER — HYDROCODONE BITARTRATE AND ACETAMINOPHEN 5; 325 MG/1; MG/1
1 TABLET ORAL
Status: COMPLETED | OUTPATIENT
Start: 2024-05-22 | End: 2024-05-22

## 2024-05-22 RX ORDER — AMOXICILLIN AND CLAVULANATE POTASSIUM 875; 125 MG/1; MG/1
1 TABLET, FILM COATED ORAL
Status: COMPLETED | OUTPATIENT
Start: 2024-05-22 | End: 2024-05-22

## 2024-05-22 RX ADMIN — BENZOCAINE, BUTAMBEN, AND TETRACAINE HYDROCHLORIDE 1 SPRAY: .028; .004; .004 AEROSOL, SPRAY TOPICAL at 20:41

## 2024-05-22 RX ADMIN — HYDROCODONE BITARTRATE AND ACETAMINOPHEN 1 TABLET: 5; 325 TABLET ORAL at 20:41

## 2024-05-22 RX ADMIN — DIPHENHYDRAMINE HYDROCHLORIDE 12.5 MG: 25 SOLUTION ORAL at 20:41

## 2024-05-22 RX ADMIN — AMOXICILLIN AND CLAVULANATE POTASSIUM 1 TABLET: 875; 125 TABLET, FILM COATED ORAL at 20:41

## 2024-05-22 RX ADMIN — LIDOCAINE HYDROCHLORIDE 15 ML: 20 SOLUTION ORAL at 20:41

## 2024-05-22 ASSESSMENT — LIFESTYLE VARIABLES
HOW MANY STANDARD DRINKS CONTAINING ALCOHOL DO YOU HAVE ON A TYPICAL DAY: PATIENT DOES NOT DRINK
HOW OFTEN DO YOU HAVE A DRINK CONTAINING ALCOHOL: NEVER

## 2024-05-22 ASSESSMENT — PAIN - FUNCTIONAL ASSESSMENT: PAIN_FUNCTIONAL_ASSESSMENT: 0-10

## 2024-05-22 ASSESSMENT — PAIN SCALES - GENERAL: PAINLEVEL_OUTOF10: 10

## 2024-05-22 NOTE — ED PROVIDER NOTES
[facility] EMERGENCY DEPT  EMERGENCY DEPARTMENT HISTORY AND PHYSICAL EXAM      Date: 5/22/2024  Patient Name: Cece Navarro  MRN: [id number]  YOB: 1965  Date of evaluation: 5/22/2024  Provider: Starla Davis PA-C   Note Started: 7:27 PM EDT 5/22/24    HISTORY OF PRESENT ILLNESS     Chief Complaint   Patient presents with   • Dental Pain   • Headache       History Provided By: Patient    HPI: Cece Navarro is a 58 y.o. female with PMH as described below who presents ambulatory to the ED for 2 weeks of right upper back dental pain.  She states that she has been taking oral ibuprofen and acetaminophen for pain without relief.  Last dose this morning.  She states she called her dentist but that they were unable to see her for over a week.  She states that she last saw dentist approximately 6 months ago and had no issues at that time.  She denies trauma or injury.  She states that the pain is so significant that it she now feels like it is hurting her neck and causing a headache.  She denies fever, chills, vision changes, dizziness, weakness, GI symptoms,  symptoms, SOB, or CP.    PAST MEDICAL HISTORY   Past Medical History:  Past Medical History:   Diagnosis Date   • Burning with urination    • CAD (coronary artery disease)     Dr Brady   • Diabetes (HCC)    • Hypercholesterolemia    • Sleep apnea     no cpap       Past Surgical History:  Past Surgical History:   Procedure Laterality Date   • CARPAL TUNNEL RELEASE  2007   • COLONOSCOPY      needs soon, incomplete   • COLONOSCOPY N/A 10/11/2023    COLONOSCOPY performed by Shawn Julian MD at [facility] ENDOSCOPY   • CORONARY ANGIOPLASTY WITH STENT PLACEMENT      x 4   • CYSTOSCOPY N/A 12/15/2023    CYSTOURETHROSCOPY WITH INJECTION OF BOTOX INTO THE BLADDER performed by Levi Young MD at [facility] MAIN OR   • ROTATOR CUFF REPAIR Left 11/06/2020       Family History:  Family History   Problem Relation Age of Onset   • Cancer Maternal Aunt         breast   •

## 2024-05-22 NOTE — ED TRIAGE NOTES
Pt states dental pain, on the right side, that is causing neck stiffness and headache on that side.

## 2024-07-05 ENCOUNTER — NURSE ONLY (OUTPATIENT)
Age: 59
End: 2024-07-05

## 2024-07-05 DIAGNOSIS — E11.40 TYPE 2 DIABETES MELLITUS WITH DIABETIC NEUROPATHY, WITHOUT LONG-TERM CURRENT USE OF INSULIN (HCC): ICD-10-CM

## 2024-07-05 LAB
EST. AVERAGE GLUCOSE BLD GHB EST-MCNC: 126 MG/DL
HBA1C MFR BLD: 6 % (ref 4–5.6)

## 2024-07-15 ENCOUNTER — OFFICE VISIT (OUTPATIENT)
Age: 59
End: 2024-07-15
Payer: MEDICARE

## 2024-07-15 ENCOUNTER — PREP FOR PROCEDURE (OUTPATIENT)
Age: 59
End: 2024-07-15

## 2024-07-15 VITALS — DIASTOLIC BLOOD PRESSURE: 66 MMHG | SYSTOLIC BLOOD PRESSURE: 133 MMHG | HEART RATE: 56 BPM

## 2024-07-15 DIAGNOSIS — R35.0 FREQUENCY OF MICTURITION: ICD-10-CM

## 2024-07-15 DIAGNOSIS — N39.41 URGE INCONTINENCE: Primary | ICD-10-CM

## 2024-07-15 DIAGNOSIS — N95.2 POSTMENOPAUSAL ATROPHIC VAGINITIS: ICD-10-CM

## 2024-07-15 DIAGNOSIS — N39.41 URGE INCONTINENCE OF URINE: ICD-10-CM

## 2024-07-15 LAB
BILIRUBIN, URINE, POC: NEGATIVE
BLOOD URINE, POC: NEGATIVE
GLUCOSE URINE, POC: 1000
KETONES, URINE, POC: NEGATIVE
LEUKOCYTE ESTERASE, URINE, POC: NEGATIVE
NITRITE, URINE, POC: NEGATIVE
PH, URINE, POC: 6 (ref 4.6–8)
PROTEIN,URINE, POC: NEGATIVE
PVR, POC: NORMAL CC
SPECIFIC GRAVITY, URINE, POC: 1.02 (ref 1–1.03)
URINALYSIS CLARITY, POC: CLEAR
URINALYSIS COLOR, POC: YELLOW
UROBILINOGEN, POC: NORMAL

## 2024-07-15 PROCEDURE — 1036F TOBACCO NON-USER: CPT | Performed by: UROLOGY

## 2024-07-15 PROCEDURE — 3075F SYST BP GE 130 - 139MM HG: CPT | Performed by: UROLOGY

## 2024-07-15 PROCEDURE — 81003 URINALYSIS AUTO W/O SCOPE: CPT | Performed by: UROLOGY

## 2024-07-15 PROCEDURE — 3078F DIAST BP <80 MM HG: CPT | Performed by: UROLOGY

## 2024-07-15 PROCEDURE — G8417 CALC BMI ABV UP PARAM F/U: HCPCS | Performed by: UROLOGY

## 2024-07-15 PROCEDURE — G8427 DOCREV CUR MEDS BY ELIG CLIN: HCPCS | Performed by: UROLOGY

## 2024-07-15 PROCEDURE — 3017F COLORECTAL CA SCREEN DOC REV: CPT | Performed by: UROLOGY

## 2024-07-15 PROCEDURE — 99214 OFFICE O/P EST MOD 30 MIN: CPT | Performed by: UROLOGY

## 2024-07-15 PROCEDURE — 51798 US URINE CAPACITY MEASURE: CPT | Performed by: UROLOGY

## 2024-07-15 RX ORDER — ESTRADIOL 0.1 MG/G
1 CREAM VAGINAL
Qty: 42.5 G | Refills: 3 | Status: SHIPPED | OUTPATIENT
Start: 2024-07-15

## 2024-07-15 NOTE — PROGRESS NOTES
HISTORY OF PRESENT ILLNESS  Cece Navarro is a 58 y.o. female   Botox worked last time for a little bit she wants a little stronger treatment at this time.  Is been over 6 months she now has no control wants to have another injection Botox we will go 200 units.  She is aware the risk of bleeding infection may not work it could put her and to retention she is aware of alternative she has no questions  1. Urge incontinence  Overview:  urge incontinence, use of depends. Denies stress incontinence.  Nocturia x 2-3 and frequency x 5-7.  On Estrace cream, Oxybutynin, and Mybetriq were not effective.    12/15/2023-CYSTOURETHROSCOPY WITH INJECTION OF BOTOX INTO THE BLADDER     Orders:  -     AMB POC URINALYSIS DIP STICK AUTO W/O MICRO  -     AMB POC PVR, JEFF,POST-VOID RES,US,NON-IMAGING  -     estradiol (ESTRACE VAGINAL) 0.1 MG/GM vaginal cream; Place 1 g vaginally three times a week Do not use applicator use fingertip to apply a pea size drop Monday Wednesday Friday to urethra, Disp-42.5 g, R-3Normal  2. Postmenopausal atrophic vaginitis  Overview:  Tx with estrace cream  Orders:  -     AMB POC PVR, JEFF,POST-VOID RES,US,NON-IMAGING  -     estradiol (ESTRACE VAGINAL) 0.1 MG/GM vaginal cream; Place 1 g vaginally three times a week Do not use applicator use fingertip to apply a pea size drop Monday Wednesday Friday to urethra, Disp-42.5 g, R-3Normal  3. Urge incontinence of urine  -     estradiol (ESTRACE VAGINAL) 0.1 MG/GM vaginal cream; Place 1 g vaginally three times a week Do not use applicator use fingertip to apply a pea size drop Monday Wednesday Friday to urethra, Disp-42.5 g, R-3Normal  4. Frequency of micturition  -     estradiol (ESTRACE VAGINAL) 0.1 MG/GM vaginal cream; Place 1 g vaginally three times a week Do not use applicator use fingertip to apply a pea size drop Monday Wednesday Friday to urethra, Disp-42.5 g, R-3Normal        PAST MEDICAL HISTORY  PMHx (including negatives):  has a past medical history of

## 2024-07-15 NOTE — PROGRESS NOTES
Chief Complaint   Patient presents with    Follow-up    Incontinence        /66   Pulse 56      PHQ-9 score is    Negative      1. \"Have you been to the ER, urgent care clinic since your last visit?  Hospitalized since your last visit?\" No    2. \"Have you seen or consulted any other health care providers outside of the [facility] since your last visit?\" No     3. For patients aged 45-75: Has the patient had a colonoscopy / FIT/ Cologuard? Yes - no Care Gap present      If the patient is female:    4. For patients aged 40-74: Has the patient had a mammogram within the past 2 years? Yes - no Care Gap present      5. For patients aged 21-65: Has the patient had a pap smear? Yes - no Care Gap present

## 2024-07-16 ENCOUNTER — OFFICE VISIT (OUTPATIENT)
Age: 59
End: 2024-07-16
Payer: MEDICARE

## 2024-07-16 VITALS
HEART RATE: 55 BPM | RESPIRATION RATE: 18 BRPM | OXYGEN SATURATION: 99 % | SYSTOLIC BLOOD PRESSURE: 115 MMHG | WEIGHT: 278 LBS | HEIGHT: 70 IN | BODY MASS INDEX: 39.8 KG/M2 | DIASTOLIC BLOOD PRESSURE: 61 MMHG | TEMPERATURE: 97.9 F

## 2024-07-16 DIAGNOSIS — I10 ESSENTIAL HYPERTENSION WITH GOAL BLOOD PRESSURE LESS THAN 140/90: ICD-10-CM

## 2024-07-16 DIAGNOSIS — E11.65 TYPE 2 DIABETES MELLITUS WITH HYPERGLYCEMIA, WITHOUT LONG-TERM CURRENT USE OF INSULIN (HCC): ICD-10-CM

## 2024-07-16 DIAGNOSIS — E78.2 HYPERLIPIDEMIA, MIXED: ICD-10-CM

## 2024-07-16 DIAGNOSIS — E11.40 TYPE 2 DIABETES MELLITUS WITH DIABETIC NEUROPATHY, WITHOUT LONG-TERM CURRENT USE OF INSULIN (HCC): Primary | ICD-10-CM

## 2024-07-16 PROCEDURE — 3074F SYST BP LT 130 MM HG: CPT | Performed by: INTERNAL MEDICINE

## 2024-07-16 PROCEDURE — 1036F TOBACCO NON-USER: CPT | Performed by: INTERNAL MEDICINE

## 2024-07-16 PROCEDURE — 99214 OFFICE O/P EST MOD 30 MIN: CPT | Performed by: INTERNAL MEDICINE

## 2024-07-16 PROCEDURE — 2022F DILAT RTA XM EVC RTNOPTHY: CPT | Performed by: INTERNAL MEDICINE

## 2024-07-16 PROCEDURE — 3078F DIAST BP <80 MM HG: CPT | Performed by: INTERNAL MEDICINE

## 2024-07-16 PROCEDURE — G8417 CALC BMI ABV UP PARAM F/U: HCPCS | Performed by: INTERNAL MEDICINE

## 2024-07-16 PROCEDURE — 3044F HG A1C LEVEL LT 7.0%: CPT | Performed by: INTERNAL MEDICINE

## 2024-07-16 PROCEDURE — 3017F COLORECTAL CA SCREEN DOC REV: CPT | Performed by: INTERNAL MEDICINE

## 2024-07-16 PROCEDURE — G8427 DOCREV CUR MEDS BY ELIG CLIN: HCPCS | Performed by: INTERNAL MEDICINE

## 2024-07-16 PROCEDURE — G2211 COMPLEX E/M VISIT ADD ON: HCPCS | Performed by: INTERNAL MEDICINE

## 2024-07-16 RX ORDER — DULAGLUTIDE 4.5 MG/.5ML
4.5 INJECTION, SOLUTION SUBCUTANEOUS
Qty: 6 ML | Refills: 3 | Status: SHIPPED | OUTPATIENT
Start: 2024-07-16

## 2024-07-16 NOTE — PROGRESS NOTES
[facility] DIABETES AND ENDOCRINOLOGY                 Mattie Quezada MD           Name : Cece Navarro    YOB: 1965           Referred by: Ashwin Orellana MD           History of Present Illness: Cece Navarro is here  for fu of Type 2 Diabetes Mellitus dx at age of 20 years  Dx 1986 at 20 years of age   She is on Trulicity, tolerating well  on Jardiance   On Linzess for constipation which is helping to some extent  Has to lose 20 pounds to get knee surgery  Limited activity,  No severe hypoglycemia     Prior history   She was referred by Ashwin Orellana MD for evaluation and management of diabetes.            Reviewed and updated this visit by clinical staff:   Tobacco  Allergies  Meds  Problems  Med Hx  Surg Hx  Fam Hx          Physical Examination:      General: pleasant, no distress, good eye contact    HEENT: no exophthalmos, no periorbital edema, EOMI    Neck: No thyromegaly    CVS: S1-S2 regular    RS: Normal respiratory effort    Musculoskeletal: no tremors    Neurological: alert and oriented    Psychiatric: normal mood and affect    Skin: Normal color      Diabetic foot exam ; August 2023       H/o partial or complete amputation of foot : No   H/o previous foot ulceration : No   H/o pre - ulcerative callus : No   H/o peripheral neuropathy and callus : No   H/o poor circulation  : No   Foot deformity : hammer toe       Data Reviewed:          Lab Results   Component Value Date    GFRAA 77 11/23/2021        Lab Results   Component Value Date    LABA1C 6.0 (H) 07/05/2024    LABA1C 6.0 (H) 04/09/2024    LABA1C 5.9 (H) 01/02/2024         Lab Results   Component Value Date     04/09/2024    K 4.3 04/09/2024     (H) 04/09/2024    CO2 27 04/09/2024    BUN 14 04/09/2024    CREATININE 1.00 04/09/2024    GFRAA 77 11/23/2021    GLUCOSE 96 04/09/2024    CALCIUM 8.9 04/09/2024    MALBCR 8 04/09/2024                       Assessment/Plan:      1. Type 2

## 2024-07-18 ENCOUNTER — HOSPITAL ENCOUNTER (OUTPATIENT)
Facility: HOSPITAL | Age: 59
Discharge: HOME | End: 2024-07-18
Payer: MEDICARE

## 2024-07-18 VITALS
SYSTOLIC BLOOD PRESSURE: 105 MMHG | RESPIRATION RATE: 18 BRPM | TEMPERATURE: 98.6 F | HEIGHT: 70 IN | WEIGHT: 277.4 LBS | OXYGEN SATURATION: 99 % | DIASTOLIC BLOOD PRESSURE: 58 MMHG | HEART RATE: 51 BPM | BODY MASS INDEX: 39.71 KG/M2

## 2024-07-18 LAB
ANION GAP SERPL CALC-SCNC: 8 MMOL/L (ref 5–15)
BASOPHILS # BLD: 0 K/UL (ref 0–0.1)
BASOPHILS NFR BLD: 1 % (ref 0–1)
BUN SERPL-MCNC: 13 MG/DL (ref 6–20)
BUN/CREAT SERPL: 14 (ref 12–20)
CA-I BLD-MCNC: 8.9 MG/DL (ref 8.5–10.1)
CHLORIDE SERPL-SCNC: 108 MMOL/L (ref 97–108)
CO2 SERPL-SCNC: 25 MMOL/L (ref 21–32)
CREAT SERPL-MCNC: 0.92 MG/DL (ref 0.55–1.02)
DIFFERENTIAL METHOD BLD: NORMAL
EKG ATRIAL RATE: 51 BPM
EKG DIAGNOSIS: NORMAL
EKG P AXIS: 58 DEGREES
EKG P-R INTERVAL: 166 MS
EKG Q-T INTERVAL: 458 MS
EKG QRS DURATION: 86 MS
EKG QTC CALCULATION (BAZETT): 422 MS
EKG R AXIS: 36 DEGREES
EKG T AXIS: 21 DEGREES
EKG VENTRICULAR RATE: 51 BPM
EOSINOPHIL # BLD: 0 K/UL (ref 0–0.4)
EOSINOPHIL NFR BLD: 1 % (ref 0–7)
ERYTHROCYTE [DISTWIDTH] IN BLOOD BY AUTOMATED COUNT: 13.1 % (ref 11.5–14.5)
EST. AVERAGE GLUCOSE BLD GHB EST-MCNC: 134 MG/DL
GLUCOSE SERPL-MCNC: 105 MG/DL (ref 65–100)
HBA1C MFR BLD: 6.3 % (ref 4–5.6)
HCT VFR BLD AUTO: 39.8 % (ref 35–47)
HGB BLD-MCNC: 13.1 G/DL (ref 11.5–16)
IMM GRANULOCYTES # BLD AUTO: 0 K/UL (ref 0–0.04)
IMM GRANULOCYTES NFR BLD AUTO: 0 % (ref 0–0.5)
LYMPHOCYTES # BLD: 1.9 K/UL (ref 0.8–3.5)
LYMPHOCYTES NFR BLD: 39 % (ref 12–49)
MCH RBC QN AUTO: 28 PG (ref 26–34)
MCHC RBC AUTO-ENTMCNC: 32.9 G/DL (ref 30–36.5)
MCV RBC AUTO: 85 FL (ref 80–99)
MONOCYTES # BLD: 0.3 K/UL (ref 0–1)
MONOCYTES NFR BLD: 5 % (ref 5–13)
NEUTS SEG # BLD: 2.6 K/UL (ref 1.8–8)
NEUTS SEG NFR BLD: 54 % (ref 32–75)
NRBC # BLD: 0 K/UL (ref 0–0.01)
NRBC BLD-RTO: 0 PER 100 WBC
PLATELET # BLD AUTO: 189 K/UL (ref 150–400)
PMV BLD AUTO: 10.4 FL (ref 8.9–12.9)
POTASSIUM SERPL-SCNC: 3.9 MMOL/L (ref 3.5–5.1)
RBC # BLD AUTO: 4.68 M/UL (ref 3.8–5.2)
SODIUM SERPL-SCNC: 141 MMOL/L (ref 136–145)
WBC # BLD AUTO: 4.8 K/UL (ref 3.6–11)

## 2024-07-18 PROCEDURE — 93005 ELECTROCARDIOGRAM TRACING: CPT | Performed by: UROLOGY

## 2024-07-18 PROCEDURE — 85025 COMPLETE CBC W/AUTO DIFF WBC: CPT

## 2024-07-18 PROCEDURE — 83036 HEMOGLOBIN GLYCOSYLATED A1C: CPT

## 2024-07-18 PROCEDURE — 36415 COLL VENOUS BLD VENIPUNCTURE: CPT

## 2024-07-18 PROCEDURE — 80048 BASIC METABOLIC PNL TOTAL CA: CPT

## 2024-07-18 PROCEDURE — 87086 URINE CULTURE/COLONY COUNT: CPT

## 2024-07-18 ASSESSMENT — PAIN SCALES - GENERAL: PAINLEVEL_OUTOF10: 0

## 2024-07-18 NOTE — PROGRESS NOTES
PAT visit completed. Clearly communicated to patient that a responsible  will need to be present in the waiting room for surgery, patient cannot drive self home, cannot take public transportation, or Medicaid cab alone. Patient informed public transportation/cab can be taken if a responsible family/friend/neighbor accompanies patient in the cab. Patient informed that if responsible transportation is not available for discharge, patients surgery will be canceled. Patient encouraged to notify Dr. Young's office if unable to attain transportation. Shayla in the office aware of the above, states she reinforced the above information during that patients office visit. Will have patient moved to end of schedule and notify SDS.

## 2024-07-18 NOTE — PROGRESS NOTES
Reviewed with Dr Batres patients cardiac history, Echo & ST from 2022, EKG from today, most recent cardiology visit 9/2023. Patient recently had same procedure in 12/2023 and did well. No new orders received, patient okay to proceed. Per patient she takes ASA OTC, ran out of ASA, last dose was around 7/12/24.

## 2024-07-19 LAB
BACTERIA SPEC CULT: NORMAL
Lab: NORMAL

## 2024-07-22 ENCOUNTER — ANESTHESIA EVENT (OUTPATIENT)
Facility: HOSPITAL | Age: 59
End: 2024-07-22
Payer: MEDICARE

## 2024-07-23 ENCOUNTER — ANESTHESIA (OUTPATIENT)
Facility: HOSPITAL | Age: 59
End: 2024-07-23
Payer: MEDICARE

## 2024-07-23 ENCOUNTER — HOSPITAL ENCOUNTER (OUTPATIENT)
Facility: HOSPITAL | Age: 59
Discharge: HOME | End: 2024-07-23
Attending: UROLOGY | Admitting: UROLOGY
Payer: MEDICARE

## 2024-07-23 VITALS
RESPIRATION RATE: 18 BRPM | TEMPERATURE: 98 F | OXYGEN SATURATION: 97 % | HEART RATE: 66 BPM | DIASTOLIC BLOOD PRESSURE: 74 MMHG | SYSTOLIC BLOOD PRESSURE: 142 MMHG

## 2024-07-23 LAB
GLUCOSE BLD STRIP.AUTO-MCNC: 100 MG/DL (ref 65–100)
GLUCOSE BLD STRIP.AUTO-MCNC: 91 MG/DL (ref 65–100)
PERFORMED BY:: NORMAL
PERFORMED BY:: NORMAL

## 2024-07-23 PROCEDURE — 2709999900 HC NON-CHARGEABLE SUPPLY: Performed by: UROLOGY

## 2024-07-23 PROCEDURE — 7100000000 HC PACU RECOVERY - FIRST 15 MIN: Performed by: UROLOGY

## 2024-07-23 PROCEDURE — 7100000010 HC PHASE II RECOVERY - FIRST 15 MIN: Performed by: UROLOGY

## 2024-07-23 PROCEDURE — 7100000011 HC PHASE II RECOVERY - ADDTL 15 MIN: Performed by: UROLOGY

## 2024-07-23 PROCEDURE — 3600000012 HC SURGERY LEVEL 2 ADDTL 15MIN: Performed by: UROLOGY

## 2024-07-23 PROCEDURE — 2580000003 HC RX 258: Performed by: UROLOGY

## 2024-07-23 PROCEDURE — 7100000001 HC PACU RECOVERY - ADDTL 15 MIN: Performed by: UROLOGY

## 2024-07-23 PROCEDURE — 6370000000 HC RX 637 (ALT 250 FOR IP): Performed by: UROLOGY

## 2024-07-23 PROCEDURE — 3600000002 HC SURGERY LEVEL 2 BASE: Performed by: UROLOGY

## 2024-07-23 PROCEDURE — 6360000002 HC RX W HCPCS: Performed by: UROLOGY

## 2024-07-23 PROCEDURE — 3700000000 HC ANESTHESIA ATTENDED CARE: Performed by: UROLOGY

## 2024-07-23 PROCEDURE — 52287 CYSTOSCOPY CHEMODENERVATION: CPT | Performed by: UROLOGY

## 2024-07-23 PROCEDURE — 6360000002 HC RX W HCPCS: Performed by: NURSE ANESTHETIST, CERTIFIED REGISTERED

## 2024-07-23 PROCEDURE — 3700000001 HC ADD 15 MINUTES (ANESTHESIA): Performed by: UROLOGY

## 2024-07-23 PROCEDURE — 82962 GLUCOSE BLOOD TEST: CPT

## 2024-07-23 PROCEDURE — 2500000003 HC RX 250 WO HCPCS: Performed by: NURSE ANESTHETIST, CERTIFIED REGISTERED

## 2024-07-23 RX ORDER — SODIUM CHLORIDE 0.9 % (FLUSH) 0.9 %
5-40 SYRINGE (ML) INJECTION PRN
Status: DISCONTINUED | OUTPATIENT
Start: 2024-07-23 | End: 2024-07-23 | Stop reason: HOSPADM

## 2024-07-23 RX ORDER — LIDOCAINE HYDROCHLORIDE 20 MG/ML
JELLY TOPICAL PRN
Status: DISCONTINUED | OUTPATIENT
Start: 2024-07-23 | End: 2024-07-23 | Stop reason: ALTCHOICE

## 2024-07-23 RX ORDER — MEPERIDINE HYDROCHLORIDE 25 MG/ML
12.5 INJECTION INTRAMUSCULAR; INTRAVENOUS; SUBCUTANEOUS EVERY 5 MIN PRN
Status: DISCONTINUED | OUTPATIENT
Start: 2024-07-23 | End: 2024-07-23 | Stop reason: HOSPADM

## 2024-07-23 RX ORDER — PHENAZOPYRIDINE HYDROCHLORIDE 100 MG/1
100 TABLET, FILM COATED ORAL 3 TIMES DAILY PRN
Qty: 10 TABLET | Refills: 0 | Status: SHIPPED | OUTPATIENT
Start: 2024-07-23 | End: 2024-07-26

## 2024-07-23 RX ORDER — LORAZEPAM 2 MG/ML
0.5 INJECTION INTRAMUSCULAR
Status: DISCONTINUED | OUTPATIENT
Start: 2024-07-23 | End: 2024-07-23 | Stop reason: HOSPADM

## 2024-07-23 RX ORDER — FENTANYL CITRATE 50 UG/ML
50 INJECTION, SOLUTION INTRAMUSCULAR; INTRAVENOUS EVERY 5 MIN PRN
Status: DISCONTINUED | OUTPATIENT
Start: 2024-07-23 | End: 2024-07-23 | Stop reason: HOSPADM

## 2024-07-23 RX ORDER — LABETALOL HYDROCHLORIDE 5 MG/ML
10 INJECTION, SOLUTION INTRAVENOUS
Status: DISCONTINUED | OUTPATIENT
Start: 2024-07-23 | End: 2024-07-23 | Stop reason: HOSPADM

## 2024-07-23 RX ORDER — FENTANYL CITRATE 50 UG/ML
INJECTION, SOLUTION INTRAMUSCULAR; INTRAVENOUS PRN
Status: DISCONTINUED | OUTPATIENT
Start: 2024-07-23 | End: 2024-07-23 | Stop reason: SDUPTHER

## 2024-07-23 RX ORDER — LIDOCAINE HYDROCHLORIDE 20 MG/ML
INJECTION, SOLUTION EPIDURAL; INFILTRATION; INTRACAUDAL; PERINEURAL PRN
Status: DISCONTINUED | OUTPATIENT
Start: 2024-07-23 | End: 2024-07-23 | Stop reason: SDUPTHER

## 2024-07-23 RX ORDER — OXYCODONE HYDROCHLORIDE 5 MG/1
10 TABLET ORAL PRN
Status: DISCONTINUED | OUTPATIENT
Start: 2024-07-23 | End: 2024-07-23 | Stop reason: HOSPADM

## 2024-07-23 RX ORDER — METOCLOPRAMIDE HYDROCHLORIDE 5 MG/ML
10 INJECTION INTRAMUSCULAR; INTRAVENOUS
Status: DISCONTINUED | OUTPATIENT
Start: 2024-07-23 | End: 2024-07-23 | Stop reason: HOSPADM

## 2024-07-23 RX ORDER — SODIUM CHLORIDE, SODIUM LACTATE, POTASSIUM CHLORIDE, CALCIUM CHLORIDE 600; 310; 30; 20 MG/100ML; MG/100ML; MG/100ML; MG/100ML
INJECTION, SOLUTION INTRAVENOUS ONCE
Status: DISCONTINUED | OUTPATIENT
Start: 2024-07-23 | End: 2024-07-23 | Stop reason: HOSPADM

## 2024-07-23 RX ORDER — HYDRALAZINE HYDROCHLORIDE 20 MG/ML
10 INJECTION INTRAMUSCULAR; INTRAVENOUS
Status: DISCONTINUED | OUTPATIENT
Start: 2024-07-23 | End: 2024-07-23 | Stop reason: HOSPADM

## 2024-07-23 RX ORDER — SODIUM CHLORIDE 9 MG/ML
INJECTION, SOLUTION INTRAVENOUS PRN
Status: DISCONTINUED | OUTPATIENT
Start: 2024-07-23 | End: 2024-07-23 | Stop reason: HOSPADM

## 2024-07-23 RX ORDER — OXYCODONE HYDROCHLORIDE 5 MG/1
5 TABLET ORAL PRN
Status: DISCONTINUED | OUTPATIENT
Start: 2024-07-23 | End: 2024-07-23 | Stop reason: HOSPADM

## 2024-07-23 RX ORDER — ONDANSETRON 2 MG/ML
4 INJECTION INTRAMUSCULAR; INTRAVENOUS
Status: DISCONTINUED | OUTPATIENT
Start: 2024-07-23 | End: 2024-07-23 | Stop reason: HOSPADM

## 2024-07-23 RX ORDER — MIDAZOLAM HYDROCHLORIDE 1 MG/ML
INJECTION INTRAMUSCULAR; INTRAVENOUS PRN
Status: DISCONTINUED | OUTPATIENT
Start: 2024-07-23 | End: 2024-07-23 | Stop reason: SDUPTHER

## 2024-07-23 RX ORDER — HYDROMORPHONE HYDROCHLORIDE 1 MG/ML
0.5 INJECTION, SOLUTION INTRAMUSCULAR; INTRAVENOUS; SUBCUTANEOUS EVERY 5 MIN PRN
Status: DISCONTINUED | OUTPATIENT
Start: 2024-07-23 | End: 2024-07-23 | Stop reason: HOSPADM

## 2024-07-23 RX ORDER — PROPOFOL 10 MG/ML
INJECTION, EMULSION INTRAVENOUS PRN
Status: DISCONTINUED | OUTPATIENT
Start: 2024-07-23 | End: 2024-07-23 | Stop reason: SDUPTHER

## 2024-07-23 RX ORDER — DEXAMETHASONE SODIUM PHOSPHATE 4 MG/ML
INJECTION, SOLUTION INTRA-ARTICULAR; INTRALESIONAL; INTRAMUSCULAR; INTRAVENOUS; SOFT TISSUE PRN
Status: DISCONTINUED | OUTPATIENT
Start: 2024-07-23 | End: 2024-07-23 | Stop reason: SDUPTHER

## 2024-07-23 RX ORDER — DOXYCYCLINE HYCLATE 100 MG
100 TABLET ORAL 2 TIMES DAILY
Qty: 14 TABLET | Refills: 0 | Status: SHIPPED | OUTPATIENT
Start: 2024-07-23 | End: 2024-07-30

## 2024-07-23 RX ORDER — GLUCAGON 1 MG/ML
1 KIT INJECTION PRN
Status: DISCONTINUED | OUTPATIENT
Start: 2024-07-23 | End: 2024-07-23 | Stop reason: HOSPADM

## 2024-07-23 RX ORDER — LIDOCAINE 4 G/G
1 PATCH TOPICAL AS NEEDED
Status: DISCONTINUED | OUTPATIENT
Start: 2024-07-23 | End: 2024-07-23 | Stop reason: HOSPADM

## 2024-07-23 RX ORDER — ONDANSETRON 2 MG/ML
INJECTION INTRAMUSCULAR; INTRAVENOUS PRN
Status: DISCONTINUED | OUTPATIENT
Start: 2024-07-23 | End: 2024-07-23 | Stop reason: SDUPTHER

## 2024-07-23 RX ORDER — NALOXONE HYDROCHLORIDE 0.4 MG/ML
INJECTION, SOLUTION INTRAMUSCULAR; INTRAVENOUS; SUBCUTANEOUS PRN
Status: DISCONTINUED | OUTPATIENT
Start: 2024-07-23 | End: 2024-07-23 | Stop reason: HOSPADM

## 2024-07-23 RX ORDER — IPRATROPIUM BROMIDE AND ALBUTEROL SULFATE 2.5; .5 MG/3ML; MG/3ML
1 SOLUTION RESPIRATORY (INHALATION)
Status: DISCONTINUED | OUTPATIENT
Start: 2024-07-23 | End: 2024-07-23 | Stop reason: HOSPADM

## 2024-07-23 RX ORDER — SODIUM CHLORIDE 0.9 % (FLUSH) 0.9 %
5-40 SYRINGE (ML) INJECTION EVERY 12 HOURS SCHEDULED
Status: DISCONTINUED | OUTPATIENT
Start: 2024-07-23 | End: 2024-07-23 | Stop reason: HOSPADM

## 2024-07-23 RX ORDER — SODIUM CHLORIDE, SODIUM LACTATE, POTASSIUM CHLORIDE, CALCIUM CHLORIDE 600; 310; 30; 20 MG/100ML; MG/100ML; MG/100ML; MG/100ML
INJECTION, SOLUTION INTRAVENOUS CONTINUOUS
Status: DISCONTINUED | OUTPATIENT
Start: 2024-07-23 | End: 2024-07-23 | Stop reason: HOSPADM

## 2024-07-23 RX ORDER — DEXTROSE MONOHYDRATE 100 MG/ML
INJECTION, SOLUTION INTRAVENOUS CONTINUOUS PRN
Status: DISCONTINUED | OUTPATIENT
Start: 2024-07-23 | End: 2024-07-23 | Stop reason: HOSPADM

## 2024-07-23 RX ORDER — DIPHENHYDRAMINE HYDROCHLORIDE 50 MG/ML
12.5 INJECTION INTRAMUSCULAR; INTRAVENOUS
Status: DISCONTINUED | OUTPATIENT
Start: 2024-07-23 | End: 2024-07-23 | Stop reason: HOSPADM

## 2024-07-23 RX ADMIN — ONDANSETRON 4 MG: 2 INJECTION INTRAMUSCULAR; INTRAVENOUS at 09:57

## 2024-07-23 RX ADMIN — CEFAZOLIN SODIUM 2000 MG: 1 INJECTION, POWDER, FOR SOLUTION INTRAMUSCULAR; INTRAVENOUS at 09:53

## 2024-07-23 RX ADMIN — PROPOFOL 150 MG: 10 INJECTION, EMULSION INTRAVENOUS at 09:57

## 2024-07-23 RX ADMIN — MIDAZOLAM 2 MG: 1 INJECTION INTRAMUSCULAR; INTRAVENOUS at 09:53

## 2024-07-23 RX ADMIN — LIDOCAINE HYDROCHLORIDE 100 MG: 20 INJECTION, SOLUTION EPIDURAL; INFILTRATION; INTRACAUDAL; PERINEURAL at 09:57

## 2024-07-23 RX ADMIN — FENTANYL CITRATE 100 MCG: 50 INJECTION, SOLUTION INTRAMUSCULAR; INTRAVENOUS at 09:57

## 2024-07-23 RX ADMIN — SODIUM CHLORIDE, POTASSIUM CHLORIDE, SODIUM LACTATE AND CALCIUM CHLORIDE: 600; 310; 30; 20 INJECTION, SOLUTION INTRAVENOUS at 09:27

## 2024-07-23 RX ADMIN — DEXAMETHASONE SODIUM PHOSPHATE 4 MG: 4 INJECTION, SOLUTION INTRA-ARTICULAR; INTRALESIONAL; INTRAMUSCULAR; INTRAVENOUS; SOFT TISSUE at 09:57

## 2024-07-23 NOTE — PROGRESS NOTES
PT ASSISTED TO BR , VOIDED , IV DC'D SITE INTACT NO SWELLING NOTED , DISCHARGE INSTRUCTIONS GIVEN TO PT AND PT'S BROTHER NATY , BOTH VERBALIZED UNDERSTANDING , NO DISTRESS NOTED

## 2024-07-23 NOTE — OP NOTE
Operative Note      Patient: Cece Navarro  YOB: 1965  MRN: [id number]    Date of Procedure: 7/23/2024    Pre-Op Diagnosis Codes:     * Urge incontinence [N39.41]    Post-Op Diagnosis: Same       Procedure(s):  CYSTOURETHROSCOPY WITH INJECTION OF BOTOX INTO THE BLADDER    Surgeon(s):  Levi Young MD    Assistant:   * No surgical staff found *    Anesthesia: General    Estimated Blood Loss (mL): Minimal    Complications: None    Specimens:   * No specimens in log *    Implants:  * No implants in log *      Drains: * No LDAs found *    Findings:  Infection Present At Time Of Surgery (PATOS) (choose all levels that have infection present):  No infection present  Other Findings: small cystocele    Detailed Description of Procedure:   Patient has urgency incontinence and not responsive to p.o. medication patient set up for Botox injection.  She is aware the risk of bleeding infection the fact the bladder may not empty at all the may not work and make her worse she is aware of alternatives.  She is aware the risk of anesthesia including heart attack pulm embolus and death she has no questions  Procedure-patient prepped and draped usual sterile fashion after undergoing anesthesia placed lithotomy position.  SCDs were applied timeout was performed preoperative antibiotics were given.  Patient was scope with 21 Cypriot cystoscope.  Patient had a normal appearing urethra no stones or tumors appreciated the bladder clear E flux to orifices.  Patient has slight cystocele.  Using the Botox needle used in 200 units per 10 cc normal saline half cc increments were injected from the trigone back.  Did not inject around the ureteral orifices.  Injected in a [place] fashion posterior to the ureteral orifices and both sides.  Afterwards went back with the [place]bee to fulgurate a couple small areas of started to bleed a little bit after the needle injection. There was no more bleeding , I emptied the bladder put

## 2024-07-23 NOTE — ANESTHESIA POSTPROCEDURE EVALUATION
Department of Anesthesiology  Postprocedure Note    Patient: Cece Navarro  MRN: [id number]  YOB: 1965  Date of evaluation: 7/23/2024    Procedure Summary       Date: 07/23/24 Room / Location: [facility] MAIN CYSTO / [facility] MAIN OR    Anesthesia Start: 0953 Anesthesia Stop: 1036    Procedure: CYSTOURETHROSCOPY WITH INJECTION OF BOTOX INTO THE BLADDER (Bladder) Diagnosis:       Urge incontinence      (Urge incontinence [N39.41])    Surgeons: Levi Young MD Responsible Provider: Silver Ng MD    Anesthesia Type: General ASA Status: 3            Anesthesia Type: General    Joe Phase I: Joe Score: 9    Joe Phase II: Joe Score: 10    Anesthesia Post Evaluation    Patient location during evaluation: PACU  Patient participation: complete - patient participated  Level of consciousness: sleepy but conscious  Pain score: 0  Airway patency: patent  Nausea & Vomiting: no nausea and no vomiting  Cardiovascular status: hemodynamically stable  Respiratory status: acceptable  Hydration status: stable  Multimodal analgesia pain management approach        No notable events documented.

## 2024-08-01 ENCOUNTER — OFFICE VISIT (OUTPATIENT)
Age: 59
End: 2024-08-01
Payer: MEDICARE

## 2024-08-01 VITALS — SYSTOLIC BLOOD PRESSURE: 137 MMHG | HEART RATE: 62 BPM | DIASTOLIC BLOOD PRESSURE: 81 MMHG

## 2024-08-01 DIAGNOSIS — R35.0 FREQUENCY OF MICTURITION: ICD-10-CM

## 2024-08-01 DIAGNOSIS — N39.41 URGE INCONTINENCE OF URINE: ICD-10-CM

## 2024-08-01 DIAGNOSIS — N95.2 ATROPHIC VAGINITIS: ICD-10-CM

## 2024-08-01 DIAGNOSIS — N95.2 POSTMENOPAUSAL ATROPHIC VAGINITIS: ICD-10-CM

## 2024-08-01 DIAGNOSIS — N39.41 URGE INCONTINENCE: Primary | ICD-10-CM

## 2024-08-01 PROCEDURE — 3017F COLORECTAL CA SCREEN DOC REV: CPT | Performed by: UROLOGY

## 2024-08-01 PROCEDURE — 3079F DIAST BP 80-89 MM HG: CPT | Performed by: UROLOGY

## 2024-08-01 PROCEDURE — 1036F TOBACCO NON-USER: CPT | Performed by: UROLOGY

## 2024-08-01 PROCEDURE — G8417 CALC BMI ABV UP PARAM F/U: HCPCS | Performed by: UROLOGY

## 2024-08-01 PROCEDURE — 99214 OFFICE O/P EST MOD 30 MIN: CPT | Performed by: UROLOGY

## 2024-08-01 PROCEDURE — 3075F SYST BP GE 130 - 139MM HG: CPT | Performed by: UROLOGY

## 2024-08-01 PROCEDURE — G8428 CUR MEDS NOT DOCUMENT: HCPCS | Performed by: UROLOGY

## 2024-08-01 NOTE — PROGRESS NOTES
HISTORY OF PRESENT ILLNESS  Cece Navarro is a 58 y.o. female   Patient returns today with a history of the urge incontinence.  She still has that she just had the Botox injection about 2 weeks ago so she will be reevaluated in a month.  I told takes up to 4 weeks to get a response sometimes longer.  This continues he may consider more therapy including repair of her cystocele.  And she has no questions we will keep on the Estrace cream  1. Urge incontinence  Overview:  urge incontinence, use of depends. Denies stress incontinence.  Nocturia x 2-3 and frequency x 5-7.  On Estrace cream, Oxybutynin, and Mybetriq were not effective.    12/15/2023-CYSTOURETHROSCOPY WITH INJECTION OF BOTOX INTO THE BLADDER    7/23/2024-CYSTOURETHROSCOPY WITH INJECTION OF BOTOX INTO THE BLADDER   Findings: small cystocele      Orders:  -     AMB POC PVR, JEFF,POST-VOID RES,US,NON-IMAGING  2. Atrophic vaginitis  Overview:  Tx with estrace cream  3. Postmenopausal atrophic vaginitis  Overview:  Tx with estrace cream  4. Urge incontinence of urine  5. Frequency of micturition        PAST MEDICAL HISTORY  PMHx (including negatives):  has a past medical history of Asthma, CAD (coronary artery disease), Diabetes (HCC), Hypercholesterolemia, Presence of stent in coronary artery, and Sleep apnea.   PSurgHx:  has a past surgical history that includes Carpal tunnel release (Left, 2007); Colonoscopy; Rotator cuff repair (Left, 11/06/2020); Coronary angioplasty with stent; Colonoscopy (N/A, 10/11/2023); Cystoscopy (N/A, 12/15/2023); and Cystoscopy (N/A, 7/23/2024).  PSocHx:  reports that she quit smoking about 19 years ago. Her smoking use included cigarettes. She has never used smokeless tobacco. She reports current alcohol use. She reports that she does not use drugs.   FamilyHX:   Family History   Problem Relation Age of Onset    Cancer Maternal Aunt         breast    Emphysema Father     Hypertension Father     Heart Disease Mother

## 2024-08-01 NOTE — PROGRESS NOTES
Chief Complaint   Patient presents with    Follow-up     botox        /81   Pulse 62      PHQ-9 score is    Negative      1. \"Have you been to the ER, urgent care clinic since your last visit?  Hospitalized since your last visit?\" No    2. \"Have you seen or consulted any other health care providers outside of the [facility] since your last visit?\" No     3. For patients aged 45-75: Has the patient had a colonoscopy / FIT/ Cologuard? Yes - no Care Gap present      If the patient is female:    4. For patients aged 40-74: Has the patient had a mammogram within the past 2 years? Yes - no Care Gap present      5. For patients aged 21-65: Has the patient had a pap smear? Yes - no Care Gap present

## 2024-09-11 ENCOUNTER — OFFICE VISIT (OUTPATIENT)
Age: 59
End: 2024-09-11
Payer: MEDICARE

## 2024-09-11 VITALS — DIASTOLIC BLOOD PRESSURE: 66 MMHG | HEART RATE: 60 BPM | SYSTOLIC BLOOD PRESSURE: 116 MMHG

## 2024-09-11 DIAGNOSIS — B37.31 YEAST VAGINITIS: ICD-10-CM

## 2024-09-11 DIAGNOSIS — N39.41 URGE INCONTINENCE: Primary | ICD-10-CM

## 2024-09-11 DIAGNOSIS — N30.90 CYSTITIS: ICD-10-CM

## 2024-09-11 LAB
BILIRUBIN, URINE, POC: NEGATIVE
BLOOD URINE, POC: ABNORMAL
GLUCOSE URINE, POC: 1000
KETONES, URINE, POC: NEGATIVE
LEUKOCYTE ESTERASE, URINE, POC: ABNORMAL
NITRITE, URINE, POC: POSITIVE
PH, URINE, POC: 5.5 (ref 4.6–8)
PROTEIN,URINE, POC: 30
SPECIFIC GRAVITY, URINE, POC: 1.02 (ref 1–1.03)
URINALYSIS CLARITY, POC: ABNORMAL
URINALYSIS COLOR, POC: YELLOW
UROBILINOGEN, POC: ABNORMAL

## 2024-09-11 PROCEDURE — 3017F COLORECTAL CA SCREEN DOC REV: CPT | Performed by: UROLOGY

## 2024-09-11 PROCEDURE — 3074F SYST BP LT 130 MM HG: CPT | Performed by: UROLOGY

## 2024-09-11 PROCEDURE — G8417 CALC BMI ABV UP PARAM F/U: HCPCS | Performed by: UROLOGY

## 2024-09-11 PROCEDURE — G8427 DOCREV CUR MEDS BY ELIG CLIN: HCPCS | Performed by: UROLOGY

## 2024-09-11 PROCEDURE — 81003 URINALYSIS AUTO W/O SCOPE: CPT | Performed by: UROLOGY

## 2024-09-11 PROCEDURE — 51798 US URINE CAPACITY MEASURE: CPT | Performed by: UROLOGY

## 2024-09-11 PROCEDURE — 99214 OFFICE O/P EST MOD 30 MIN: CPT | Performed by: UROLOGY

## 2024-09-11 PROCEDURE — 3078F DIAST BP <80 MM HG: CPT | Performed by: UROLOGY

## 2024-09-11 PROCEDURE — 1036F TOBACCO NON-USER: CPT | Performed by: UROLOGY

## 2024-09-11 RX ORDER — CIPROFLOXACIN 500 MG/1
500 TABLET, FILM COATED ORAL 2 TIMES DAILY
Qty: 14 TABLET | Refills: 0 | Status: SHIPPED | OUTPATIENT
Start: 2024-09-11 | End: 2024-09-18

## 2024-09-11 RX ORDER — FLUCONAZOLE 150 MG/1
150 TABLET ORAL ONCE
Qty: 1 TABLET | Refills: 1 | Status: SHIPPED | OUTPATIENT
Start: 2024-09-11 | End: 2024-09-11

## 2024-09-13 ENCOUNTER — TELEPHONE (OUTPATIENT)
Age: 59
End: 2024-09-13

## 2024-09-13 LAB — BACTERIA UR CULT: ABNORMAL

## 2024-09-30 ENCOUNTER — HOSPITAL ENCOUNTER (OUTPATIENT)
Facility: HOSPITAL | Age: 59
Setting detail: OBSERVATION
Discharge: HOME HEALTH | End: 2024-10-01
Attending: EMERGENCY MEDICINE
Payer: MEDICARE

## 2024-09-30 ENCOUNTER — APPOINTMENT (OUTPATIENT)
Facility: HOSPITAL | Age: 59
End: 2024-09-30
Payer: MEDICARE

## 2024-09-30 DIAGNOSIS — G45.9 TIA (TRANSIENT ISCHEMIC ATTACK): ICD-10-CM

## 2024-09-30 DIAGNOSIS — E11.40 TYPE 2 DIABETES MELLITUS WITH DIABETIC NEUROPATHY, WITHOUT LONG-TERM CURRENT USE OF INSULIN (HCC): ICD-10-CM

## 2024-09-30 DIAGNOSIS — R29.90 STROKE-LIKE SYMPTOMS: ICD-10-CM

## 2024-09-30 DIAGNOSIS — R07.9 ACUTE CHEST PAIN: Primary | ICD-10-CM

## 2024-09-30 LAB
ALBUMIN SERPL-MCNC: 3.3 G/DL (ref 3.5–5)
ALBUMIN/GLOB SERPL: 1 (ref 1.1–2.2)
ALP SERPL-CCNC: 102 U/L (ref 45–117)
ALT SERPL-CCNC: 23 U/L (ref 12–78)
ANION GAP SERPL CALC-SCNC: 6 MMOL/L (ref 2–12)
AST SERPL W P-5'-P-CCNC: 25 U/L (ref 15–37)
BASOPHILS # BLD: 0 K/UL (ref 0–0.1)
BASOPHILS NFR BLD: 1 % (ref 0–1)
BILIRUB SERPL-MCNC: 0.6 MG/DL (ref 0.2–1)
BUN SERPL-MCNC: 11 MG/DL (ref 6–20)
BUN/CREAT SERPL: 12 (ref 12–20)
CA-I BLD-MCNC: 8.4 MG/DL (ref 8.5–10.1)
CHLORIDE SERPL-SCNC: 112 MMOL/L (ref 97–108)
CO2 SERPL-SCNC: 25 MMOL/L (ref 21–32)
CREAT SERPL-MCNC: 0.92 MG/DL (ref 0.55–1.02)
DIFFERENTIAL METHOD BLD: NORMAL
EOSINOPHIL # BLD: 0.1 K/UL (ref 0–0.4)
EOSINOPHIL NFR BLD: 1 % (ref 0–7)
ERYTHROCYTE [DISTWIDTH] IN BLOOD BY AUTOMATED COUNT: 13.6 % (ref 11.5–14.5)
GLOBULIN SER CALC-MCNC: 3.4 G/DL (ref 2–4)
GLUCOSE BLD STRIP.AUTO-MCNC: 118 MG/DL (ref 65–100)
GLUCOSE SERPL-MCNC: 104 MG/DL (ref 65–100)
HCT VFR BLD AUTO: 38.4 % (ref 35–47)
HGB BLD-MCNC: 12.6 G/DL (ref 11.5–16)
IMM GRANULOCYTES # BLD AUTO: 0 K/UL (ref 0–0.04)
IMM GRANULOCYTES NFR BLD AUTO: 0 % (ref 0–0.5)
LYMPHOCYTES # BLD: 2.6 K/UL (ref 0.8–3.5)
LYMPHOCYTES NFR BLD: 46 % (ref 12–49)
MAGNESIUM SERPL-MCNC: 1.5 MG/DL (ref 1.6–2.4)
MCH RBC QN AUTO: 27.5 PG (ref 26–34)
MCHC RBC AUTO-ENTMCNC: 32.8 G/DL (ref 30–36.5)
MCV RBC AUTO: 83.7 FL (ref 80–99)
MONOCYTES # BLD: 0.3 K/UL (ref 0–1)
MONOCYTES NFR BLD: 6 % (ref 5–13)
NEUTS SEG # BLD: 2.6 K/UL (ref 1.8–8)
NEUTS SEG NFR BLD: 46 % (ref 32–75)
NRBC # BLD: 0 K/UL (ref 0–0.01)
NRBC BLD-RTO: 0 PER 100 WBC
PERFORMED BY:: ABNORMAL
PLATELET # BLD AUTO: 197 K/UL (ref 150–400)
PMV BLD AUTO: 10.8 FL (ref 8.9–12.9)
POTASSIUM SERPL-SCNC: 4.3 MMOL/L (ref 3.5–5.1)
PROT SERPL-MCNC: 6.7 G/DL (ref 6.4–8.2)
RBC # BLD AUTO: 4.59 M/UL (ref 3.8–5.2)
SODIUM SERPL-SCNC: 143 MMOL/L (ref 136–145)
TROPONIN I SERPL HS-MCNC: 5 NG/L (ref 0–51)
WBC # BLD AUTO: 5.7 K/UL (ref 3.6–11)

## 2024-09-30 PROCEDURE — 0042T CT BRAIN PERFUSION: CPT

## 2024-09-30 PROCEDURE — 82962 GLUCOSE BLOOD TEST: CPT

## 2024-09-30 PROCEDURE — 84484 ASSAY OF TROPONIN QUANT: CPT

## 2024-09-30 PROCEDURE — 36415 COLL VENOUS BLD VENIPUNCTURE: CPT

## 2024-09-30 PROCEDURE — 6370000000 HC RX 637 (ALT 250 FOR IP): Performed by: EMERGENCY MEDICINE

## 2024-09-30 PROCEDURE — 85025 COMPLETE CBC W/AUTO DIFF WBC: CPT

## 2024-09-30 PROCEDURE — 6370000000 HC RX 637 (ALT 250 FOR IP)

## 2024-09-30 PROCEDURE — 71045 X-RAY EXAM CHEST 1 VIEW: CPT

## 2024-09-30 PROCEDURE — 83735 ASSAY OF MAGNESIUM: CPT

## 2024-09-30 PROCEDURE — 93005 ELECTROCARDIOGRAM TRACING: CPT | Performed by: EMERGENCY MEDICINE

## 2024-09-30 PROCEDURE — 70498 CT ANGIOGRAPHY NECK: CPT

## 2024-09-30 PROCEDURE — 80053 COMPREHEN METABOLIC PANEL: CPT

## 2024-09-30 PROCEDURE — 6360000004 HC RX CONTRAST MEDICATION: Performed by: EMERGENCY MEDICINE

## 2024-09-30 PROCEDURE — 99285 EMERGENCY DEPT VISIT HI MDM: CPT

## 2024-09-30 PROCEDURE — 70450 CT HEAD/BRAIN W/O DYE: CPT

## 2024-09-30 RX ORDER — IOPAMIDOL 755 MG/ML
110 INJECTION, SOLUTION INTRAVASCULAR
Status: COMPLETED | OUTPATIENT
Start: 2024-09-30 | End: 2024-09-30

## 2024-09-30 RX ORDER — ASPIRIN 81 MG/1
TABLET, CHEWABLE ORAL
Status: COMPLETED
Start: 2024-09-30 | End: 2024-09-30

## 2024-09-30 RX ORDER — NITROGLYCERIN 0.4 MG/1
0.4 TABLET SUBLINGUAL EVERY 5 MIN PRN
Status: DISCONTINUED | OUTPATIENT
Start: 2024-09-30 | End: 2024-10-01 | Stop reason: SDUPTHER

## 2024-09-30 RX ORDER — ASPIRIN 81 MG/1
243 TABLET, CHEWABLE ORAL ONCE
Status: COMPLETED | OUTPATIENT
Start: 2024-09-30 | End: 2024-09-30

## 2024-09-30 RX ADMIN — IOPAMIDOL 110 ML: 755 INJECTION, SOLUTION INTRAVENOUS at 23:37

## 2024-09-30 RX ADMIN — ASPIRIN 243 MG: 81 TABLET, CHEWABLE ORAL at 23:22

## 2024-09-30 RX ADMIN — ASPIRIN 81 MG 243 MG: 81 TABLET ORAL at 23:22

## 2024-09-30 ASSESSMENT — PAIN - FUNCTIONAL ASSESSMENT: PAIN_FUNCTIONAL_ASSESSMENT: 0-10

## 2024-09-30 ASSESSMENT — PAIN SCALES - GENERAL
PAINLEVEL_OUTOF10: 10
PAINLEVEL_OUTOF10: 10

## 2024-09-30 ASSESSMENT — LIFESTYLE VARIABLES
HOW OFTEN DO YOU HAVE A DRINK CONTAINING ALCOHOL: NEVER
HOW MANY STANDARD DRINKS CONTAINING ALCOHOL DO YOU HAVE ON A TYPICAL DAY: PATIENT DOES NOT DRINK

## 2024-09-30 ASSESSMENT — PAIN DESCRIPTION - LOCATION
LOCATION: CHEST
LOCATION: CHEST

## 2024-10-01 ENCOUNTER — APPOINTMENT (OUTPATIENT)
Facility: HOSPITAL | Age: 59
End: 2024-10-01
Payer: MEDICARE

## 2024-10-01 VITALS
HEART RATE: 54 BPM | SYSTOLIC BLOOD PRESSURE: 158 MMHG | WEIGHT: 276 LBS | TEMPERATURE: 98.6 F | OXYGEN SATURATION: 99 % | RESPIRATION RATE: 18 BRPM | BODY MASS INDEX: 39.51 KG/M2 | HEIGHT: 70 IN | DIASTOLIC BLOOD PRESSURE: 70 MMHG

## 2024-10-01 PROBLEM — G45.9 TIA (TRANSIENT ISCHEMIC ATTACK): Status: ACTIVE | Noted: 2024-10-01

## 2024-10-01 PROBLEM — R07.9 ACUTE CHEST PAIN: Status: ACTIVE | Noted: 2024-10-01

## 2024-10-01 PROBLEM — R29.898 TRANSIENT WEAKNESS OF LEFT LOWER EXTREMITY: Status: ACTIVE | Noted: 2024-10-01

## 2024-10-01 PROBLEM — R29.90 STROKE-LIKE SYMPTOMS: Status: ACTIVE | Noted: 2024-10-01

## 2024-10-01 PROBLEM — R29.898 WEAKNESS OF LEFT UPPER EXTREMITY: Status: ACTIVE | Noted: 2024-10-01

## 2024-10-01 LAB
ANION GAP SERPL CALC-SCNC: 6 MMOL/L (ref 2–12)
BASOPHILS # BLD: 0 K/UL (ref 0–0.1)
BASOPHILS NFR BLD: 1 % (ref 0–1)
BUN SERPL-MCNC: 10 MG/DL (ref 6–20)
BUN/CREAT SERPL: 11 (ref 12–20)
CA-I BLD-MCNC: 8.3 MG/DL (ref 8.5–10.1)
CHLORIDE SERPL-SCNC: 111 MMOL/L (ref 97–108)
CHOLEST SERPL-MCNC: 94 MG/DL
CO2 SERPL-SCNC: 25 MMOL/L (ref 21–32)
CREAT SERPL-MCNC: 0.92 MG/DL (ref 0.55–1.02)
DIFFERENTIAL METHOD BLD: NORMAL
EKG ATRIAL RATE: 55 BPM
EKG DIAGNOSIS: NORMAL
EKG P AXIS: 64 DEGREES
EKG P-R INTERVAL: 164 MS
EKG Q-T INTERVAL: 434 MS
EKG QRS DURATION: 92 MS
EKG QTC CALCULATION (BAZETT): 415 MS
EKG R AXIS: 91 DEGREES
EKG T AXIS: 52 DEGREES
EKG VENTRICULAR RATE: 55 BPM
EOSINOPHIL # BLD: 0.1 K/UL (ref 0–0.4)
EOSINOPHIL NFR BLD: 1 % (ref 0–7)
ERYTHROCYTE [DISTWIDTH] IN BLOOD BY AUTOMATED COUNT: 13.7 % (ref 11.5–14.5)
EST. AVERAGE GLUCOSE BLD GHB EST-MCNC: 140 MG/DL
GLUCOSE BLD STRIP.AUTO-MCNC: 134 MG/DL (ref 65–100)
GLUCOSE BLD STRIP.AUTO-MCNC: 149 MG/DL (ref 65–100)
GLUCOSE SERPL-MCNC: 152 MG/DL (ref 65–100)
HBA1C MFR BLD: 6.5 % (ref 4–5.6)
HCT VFR BLD AUTO: 36.3 % (ref 35–47)
HDLC SERPL-MCNC: 38 MG/DL
HDLC SERPL: 2.5 (ref 0–5)
HGB BLD-MCNC: 11.6 G/DL (ref 11.5–16)
IMM GRANULOCYTES # BLD AUTO: 0 K/UL (ref 0–0.04)
IMM GRANULOCYTES NFR BLD AUTO: 0 % (ref 0–0.5)
LDLC SERPL CALC-MCNC: 43.8 MG/DL (ref 0–100)
LIPID PANEL: NORMAL
LYMPHOCYTES # BLD: 2.5 K/UL (ref 0.8–3.5)
LYMPHOCYTES NFR BLD: 43 % (ref 12–49)
MCH RBC QN AUTO: 27.1 PG (ref 26–34)
MCHC RBC AUTO-ENTMCNC: 32 G/DL (ref 30–36.5)
MCV RBC AUTO: 84.8 FL (ref 80–99)
MONOCYTES # BLD: 0.3 K/UL (ref 0–1)
MONOCYTES NFR BLD: 5 % (ref 5–13)
NEUTS SEG # BLD: 2.9 K/UL (ref 1.8–8)
NEUTS SEG NFR BLD: 50 % (ref 32–75)
NRBC # BLD: 0 K/UL (ref 0–0.01)
NRBC BLD-RTO: 0 PER 100 WBC
PERFORMED BY:: ABNORMAL
PERFORMED BY:: ABNORMAL
PHOSPHATE SERPL-MCNC: 2.4 MG/DL (ref 2.6–4.7)
PLATELET # BLD AUTO: 176 K/UL (ref 150–400)
PMV BLD AUTO: 10.8 FL (ref 8.9–12.9)
POTASSIUM SERPL-SCNC: 3.6 MMOL/L (ref 3.5–5.1)
RBC # BLD AUTO: 4.28 M/UL (ref 3.8–5.2)
SODIUM SERPL-SCNC: 142 MMOL/L (ref 136–145)
TRIGL SERPL-MCNC: 61 MG/DL
TROPONIN I SERPL HS-MCNC: 5 NG/L (ref 0–51)
VLDLC SERPL CALC-MCNC: 12.2 MG/DL
WBC # BLD AUTO: 5.7 K/UL (ref 3.6–11)

## 2024-10-01 PROCEDURE — 97165 OT EVAL LOW COMPLEX 30 MIN: CPT

## 2024-10-01 PROCEDURE — 93308 TTE F-UP OR LMTD: CPT

## 2024-10-01 PROCEDURE — 82962 GLUCOSE BLOOD TEST: CPT

## 2024-10-01 PROCEDURE — G0378 HOSPITAL OBSERVATION PER HR: HCPCS

## 2024-10-01 PROCEDURE — 36415 COLL VENOUS BLD VENIPUNCTURE: CPT

## 2024-10-01 PROCEDURE — 97530 THERAPEUTIC ACTIVITIES: CPT

## 2024-10-01 PROCEDURE — 97161 PT EVAL LOW COMPLEX 20 MIN: CPT

## 2024-10-01 PROCEDURE — 70551 MRI BRAIN STEM W/O DYE: CPT

## 2024-10-01 PROCEDURE — 84100 ASSAY OF PHOSPHORUS: CPT

## 2024-10-01 PROCEDURE — 6370000000 HC RX 637 (ALT 250 FOR IP)

## 2024-10-01 PROCEDURE — 85025 COMPLETE CBC W/AUTO DIFF WBC: CPT

## 2024-10-01 PROCEDURE — 2500000003 HC RX 250 WO HCPCS

## 2024-10-01 PROCEDURE — 6360000002 HC RX W HCPCS

## 2024-10-01 PROCEDURE — 96365 THER/PROPH/DIAG IV INF INIT: CPT

## 2024-10-01 PROCEDURE — 83036 HEMOGLOBIN GLYCOSYLATED A1C: CPT

## 2024-10-01 PROCEDURE — 84484 ASSAY OF TROPONIN QUANT: CPT

## 2024-10-01 PROCEDURE — 80061 LIPID PANEL: CPT

## 2024-10-01 PROCEDURE — 80048 BASIC METABOLIC PNL TOTAL CA: CPT

## 2024-10-01 PROCEDURE — 96366 THER/PROPH/DIAG IV INF ADDON: CPT

## 2024-10-01 PROCEDURE — 6370000000 HC RX 637 (ALT 250 FOR IP): Performed by: EMERGENCY MEDICINE

## 2024-10-01 PROCEDURE — 96372 THER/PROPH/DIAG INJ SC/IM: CPT

## 2024-10-01 RX ORDER — ISOSORBIDE MONONITRATE 30 MG/1
30 TABLET, EXTENDED RELEASE ORAL DAILY
Status: DISCONTINUED | OUTPATIENT
Start: 2024-10-01 | End: 2024-10-01 | Stop reason: HOSPADM

## 2024-10-01 RX ORDER — INSULIN LISPRO 100 [IU]/ML
0-4 INJECTION, SOLUTION INTRAVENOUS; SUBCUTANEOUS NIGHTLY
Status: DISCONTINUED | OUTPATIENT
Start: 2024-10-01 | End: 2024-10-01 | Stop reason: HOSPADM

## 2024-10-01 RX ORDER — ONDANSETRON 2 MG/ML
4 INJECTION INTRAMUSCULAR; INTRAVENOUS EVERY 6 HOURS PRN
Status: DISCONTINUED | OUTPATIENT
Start: 2024-10-01 | End: 2024-10-01 | Stop reason: HOSPADM

## 2024-10-01 RX ORDER — ASPIRIN 300 MG/1
300 SUPPOSITORY RECTAL DAILY
Status: DISCONTINUED | OUTPATIENT
Start: 2024-10-01 | End: 2024-10-01 | Stop reason: HOSPADM

## 2024-10-01 RX ORDER — NITROGLYCERIN 0.4 MG/1
0.4 TABLET SUBLINGUAL EVERY 5 MIN PRN
Status: DISCONTINUED | OUTPATIENT
Start: 2024-10-01 | End: 2024-10-01 | Stop reason: HOSPADM

## 2024-10-01 RX ORDER — ATORVASTATIN CALCIUM 40 MG/1
80 TABLET, FILM COATED ORAL NIGHTLY
Status: DISCONTINUED | OUTPATIENT
Start: 2024-10-01 | End: 2024-10-01 | Stop reason: HOSPADM

## 2024-10-01 RX ORDER — GLUCAGON 1 MG/ML
1 KIT INJECTION PRN
Status: DISCONTINUED | OUTPATIENT
Start: 2024-10-01 | End: 2024-10-01 | Stop reason: HOSPADM

## 2024-10-01 RX ORDER — MAGNESIUM SULFATE HEPTAHYDRATE 40 MG/ML
2000 INJECTION, SOLUTION INTRAVENOUS ONCE
Status: COMPLETED | OUTPATIENT
Start: 2024-10-01 | End: 2024-10-01

## 2024-10-01 RX ORDER — POLYETHYLENE GLYCOL 3350 17 G/17G
17 POWDER, FOR SOLUTION ORAL DAILY PRN
Status: DISCONTINUED | OUTPATIENT
Start: 2024-10-01 | End: 2024-10-01 | Stop reason: HOSPADM

## 2024-10-01 RX ORDER — ENOXAPARIN SODIUM 100 MG/ML
30 INJECTION SUBCUTANEOUS 2 TIMES DAILY
Status: DISCONTINUED | OUTPATIENT
Start: 2024-10-01 | End: 2024-10-01 | Stop reason: HOSPADM

## 2024-10-01 RX ORDER — INSULIN LISPRO 100 [IU]/ML
0-8 INJECTION, SOLUTION INTRAVENOUS; SUBCUTANEOUS
Status: DISCONTINUED | OUTPATIENT
Start: 2024-10-01 | End: 2024-10-01 | Stop reason: HOSPADM

## 2024-10-01 RX ORDER — ATORVASTATIN CALCIUM 40 MG/1
40 TABLET, FILM COATED ORAL NIGHTLY
Status: DISCONTINUED | OUTPATIENT
Start: 2024-10-01 | End: 2024-10-01

## 2024-10-01 RX ORDER — ALBUTEROL SULFATE 90 UG/1
2 INHALANT RESPIRATORY (INHALATION) EVERY 6 HOURS PRN
Status: DISCONTINUED | OUTPATIENT
Start: 2024-10-01 | End: 2024-10-01 | Stop reason: HOSPADM

## 2024-10-01 RX ORDER — ASPIRIN 81 MG/1
81 TABLET, CHEWABLE ORAL DAILY
Status: DISCONTINUED | OUTPATIENT
Start: 2024-10-01 | End: 2024-10-01 | Stop reason: HOSPADM

## 2024-10-01 RX ORDER — DEXTROSE MONOHYDRATE 100 MG/ML
INJECTION, SOLUTION INTRAVENOUS CONTINUOUS PRN
Status: DISCONTINUED | OUTPATIENT
Start: 2024-10-01 | End: 2024-10-01 | Stop reason: HOSPADM

## 2024-10-01 RX ORDER — SODIUM CHLORIDE 0.9 % (FLUSH) 0.9 %
5-40 SYRINGE (ML) INJECTION PRN
Status: DISCONTINUED | OUTPATIENT
Start: 2024-10-01 | End: 2024-10-01 | Stop reason: HOSPADM

## 2024-10-01 RX ORDER — SODIUM CHLORIDE 9 MG/ML
INJECTION, SOLUTION INTRAVENOUS PRN
Status: DISCONTINUED | OUTPATIENT
Start: 2024-10-01 | End: 2024-10-01 | Stop reason: HOSPADM

## 2024-10-01 RX ORDER — ROSUVASTATIN CALCIUM 20 MG/1
40 TABLET, COATED ORAL NIGHTLY
Status: DISCONTINUED | OUTPATIENT
Start: 2024-10-01 | End: 2024-10-01

## 2024-10-01 RX ORDER — ONDANSETRON 4 MG/1
4 TABLET, ORALLY DISINTEGRATING ORAL EVERY 8 HOURS PRN
Status: DISCONTINUED | OUTPATIENT
Start: 2024-10-01 | End: 2024-10-01 | Stop reason: HOSPADM

## 2024-10-01 RX ORDER — BUDESONIDE AND FORMOTEROL FUMARATE DIHYDRATE 160; 4.5 UG/1; UG/1
2 AEROSOL RESPIRATORY (INHALATION)
Status: DISCONTINUED | OUTPATIENT
Start: 2024-10-01 | End: 2024-10-01 | Stop reason: HOSPADM

## 2024-10-01 RX ORDER — SODIUM CHLORIDE 0.9 % (FLUSH) 0.9 %
5-40 SYRINGE (ML) INJECTION EVERY 12 HOURS SCHEDULED
Status: DISCONTINUED | OUTPATIENT
Start: 2024-10-01 | End: 2024-10-01 | Stop reason: HOSPADM

## 2024-10-01 RX ORDER — ACETAMINOPHEN 325 MG/1
325 TABLET ORAL EVERY 6 HOURS PRN
Status: DISCONTINUED | OUTPATIENT
Start: 2024-10-01 | End: 2024-10-01 | Stop reason: HOSPADM

## 2024-10-01 RX ADMIN — ASPIRIN 81 MG 81 MG: 81 TABLET ORAL at 11:11

## 2024-10-01 RX ADMIN — ENOXAPARIN SODIUM 30 MG: 100 INJECTION SUBCUTANEOUS at 11:09

## 2024-10-01 RX ADMIN — MAGNESIUM SULFATE HEPTAHYDRATE 2000 MG: 40 INJECTION, SOLUTION INTRAVENOUS at 04:58

## 2024-10-01 RX ADMIN — ACETAMINOPHEN 325 MG: 325 TABLET ORAL at 11:10

## 2024-10-01 RX ADMIN — Medication 0.4 MG: at 00:04

## 2024-10-01 RX ADMIN — ISOSORBIDE MONONITRATE 30 MG: 30 TABLET, EXTENDED RELEASE ORAL at 11:10

## 2024-10-01 ASSESSMENT — PAIN SCALES - GENERAL
PAINLEVEL_OUTOF10: 5
PAINLEVEL_OUTOF10: 0
PAINLEVEL_OUTOF10: 5
PAINLEVEL_OUTOF10: 3
PAINLEVEL_OUTOF10: 5
PAINLEVEL_OUTOF10: 5

## 2024-10-01 ASSESSMENT — PAIN DESCRIPTION - LOCATION
LOCATION: CHEST

## 2024-10-01 ASSESSMENT — PAIN DESCRIPTION - ORIENTATION
ORIENTATION: MID;ANTERIOR
ORIENTATION: LEFT

## 2024-10-01 ASSESSMENT — HEART SCORE: ECG: NON-SPECIFC REPOLARIZATION DISTURBANCE/LBTB/PM

## 2024-10-01 ASSESSMENT — ENCOUNTER SYMPTOMS
GASTROINTESTINAL NEGATIVE: 1
EYES NEGATIVE: 1
RESPIRATORY NEGATIVE: 1

## 2024-10-01 ASSESSMENT — PAIN DESCRIPTION - DESCRIPTORS: DESCRIPTORS: ACHING;TENDER;DULL

## 2024-10-01 NOTE — ED TRIAGE NOTES
Pt presents to ED with c/o 10/10 midsternum chest pain going down left arm starting a couple hours ago while sitting watching tv. Also complaining of left arm numbness starting about 10 minutes ago.

## 2024-10-01 NOTE — CARE COORDINATION
Chart reviewed. CM verbally updated on therapy recommendation for home health services.    CM met with patient for choice. No preference of agency. [facility]    [facility] patient for post hospital care. --Anticipated Start of Care 10-     Patient updated on HH acceptance

## 2024-10-01 NOTE — ED PROVIDER NOTES
REPAIR Left 2020       Family History:  Family History   Problem Relation Age of Onset    Cancer Maternal Aunt         breast    Emphysema Father     Hypertension Father     Heart Disease Mother        Social History:  Social History     Tobacco Use    Smoking status: Former     Current packs/day: 0.00     Types: Cigarettes     Quit date: 2005     Years since quittin.7    Smokeless tobacco: Never   Vaping Use    Vaping status: Never Used   Substance Use Topics    Alcohol use: Yes     Comment: occ    Drug use: Never       Allergies:  Allergies   Allergen Reactions    Cyclobenzaprine Itching    Prednisone Other (See Comments)     Yeast infection       PCP: Ashwin Orellana MD    Current Meds:   Current Facility-Administered Medications   Medication Dose Route Frequency Provider Last Rate Last Admin    nitroGLYCERIN (NITROSTAT) SL tablet 0.4 mg  0.4 mg SubLINGual Q5 Min PRN Fransisco Caicedo DO   0.4 mg at 10/01/24 0004     Current Outpatient Medications   Medication Sig Dispense Refill    empagliflozin (JARDIANCE) 10 MG tablet Take 1 tablet by mouth daily 90 tablet 3    Dulaglutide (TRULICITY) 4.5 MG/0.5ML SOPN Inject 4.5 mg into the skin every 7 days (Patient taking differently: Inject 4.5 mg into the skin every 7 days Every ) 6 mL 3    estradiol (ESTRACE VAGINAL) 0.1 MG/GM vaginal cream Place 1 g vaginally three times a week Do not use applicator use fingertip to apply a pea size drop  to urethra (Patient not taking: Reported on 2024) 42.5 g 3    ketorolac (TORADOL) 10 MG tablet Take 1 tablet by mouth every 6 hours as needed for Pain (Patient not taking: Reported on 2024) 20 tablet 0    LINZESS 290 MCG CAPS capsule Take 1 capsule by mouth every morning (before breakfast)      Acetaminophen (TYLENOL PO) Take 2 tablets by mouth as needed (pain)      metFORMIN (GLUCOPHAGE) 1000 MG tablet Take 1 tablet by mouth 2 times daily (with meals) 180 tablet 3    albuterol

## 2024-10-01 NOTE — H&P
Hospitalist Admission Note    NAME: Cece Navarro   :  1965   MRN:  [id number]     Date/Time:  10/1/2024 3:21 AM    Patient PCP: Ashwin Orellana MD    ______________________________________________________________________  Given the patient's current clinical presentation, I have a high level of concern for decompensation if discharged from the emergency department.  Complex decision making was performed, which includes reviewing the patient's available past medical records, laboratory results, and x-ray films.       My assessment of this patient's clinical condition and my plan of care is as follows.    Assessment / Plan:    TIA, r/o Ischemic Stroke   -admit pt  -reviewed CT head/CTA H and neck   -allow permessive hypertension next 24-36 hrs, treat if SBP >220/120  -Start High intensity statin   -Obtain MRI of the brain  -Continue ASA and Plavix   -Maintian aspiration precautions   -Tylenol for Fever   -Avoid hypoglycemia   -F/u on TSH, HbA1c, Lipid panel   -PT/OT/SLP  -If passes bedside dysphagia eval, continue p.o. diet  -Appreciate teleneurology recommendation    Chest pain, likely unstable angina  CAD status post PCI  -Chest pain started at rest, responded to nitroglycerin  -Got full dose of aspirin in the ED, for suspected ACS, however EKG unremarkable, troponin negative  -Takes isosorbide mononitrate at home, once a day  -Will continue nitroglycerin as needed  -Will appreciate cardiology recs     COPD/asthma, not in exacerbation  -continue home inhaler     Longstanding diabetes mellitus  -Will hold Januvia, metformin, Trulicity q.  while inpt   -Will continue medium dose sliding scale  -Goal blood sugar 140-180    Osteoarthritis of the knees, right hip  -Uses cane to ambulate  -Follow-up PT eval  -Continue pain with Tylenol    Urge incontinence  -Will hold for now oxybutynin for now, reinitiate gradually    Code Status: Full   DVT Prophylaxis: Lovenox   GI Prophylaxis:not indicated

## 2024-10-01 NOTE — ED NOTES
ED TO INPATIENT SBAR HANDOFF    Patient Name: Cece Navarro   Preferred Name: Cece  : 1965  58 y.o.   Family/Caregiver Present: no   Code Status Order: No Order  PO Status: NPO:Yes  Telemetry Order: Yes  C-SSRS: Risk of Suicide: No Risk  Sitter no   Restraints:     Sepsis Risk Score      Situation  Chief Complaint   Patient presents with    Chest Pain     Brief Description of Patient's Condition: PT came in with 10/10 chest pain and left arm numbness. Level 1 stroke alert called, all numbness has resolved at this time. Next neuro assessment due at 0600  Mental Status: oriented, alert, coherent, logical, thought processes intact, and able to concentrate and follow conversation  Arrived from:Home  Imaging:   CTA HEAD NECK W CONTRAST   Final Result      1. Questionable perfusion mismatch in the right superior cerebellum. No   corresponding large vessel occlusions identified. Recommend MRI.      2. Otherwise no significant vascular disease noted..      Electronically signed by [email or web address]      CT BRAIN PERFUSION   Final Result      1. Questionable perfusion mismatch in the right superior cerebellum. No   corresponding large vessel occlusions identified. Recommend MRI.      2. Otherwise no significant vascular disease noted..      Electronically signed by [email or web address]      CT HEAD WO CONTRAST   Final Result   No acute intracranial abnormality            Electronically signed by [email or web address]      XR CHEST PORTABLE   Final Result   No acute cardiopulmonary process.      Electronically signed by [email or web address]        Abnormal labs:   Abnormal Labs Reviewed   COMPREHENSIVE METABOLIC PANEL W/ REFLEX TO MG FOR LOW K - Abnormal; Notable for the following components:       Result Value    Chloride 112 (*)     Glucose 104 (*)     Calcium 8.4 (*)     Albumin 3.3 (*)     Albumin/Globulin Ratio 1.0 (*)     All other components within normal limits   MAGNESIUM - Abnormal; Notable for the following components:

## 2024-10-01 NOTE — PROGRESS NOTES
Consult received for bedside swallow evaluation. Patient passed swallow screen and tolerating regular diet. Discussed w/ RN. No speech language deficits observed. MRI head neg for acute findings.  Screen completed. Will d/c ST at this time. Please re-consult as medically indicated.

## 2024-10-01 NOTE — DISCHARGE SUMMARY
Discharge Summary    Name: Cece Navarro  [id number]  YOB: 1965 (Age: 58 y.o.)   Date of Admission: 9/30/2024  Date of Discharge: 10/1/2024  Attending Physician: Doris Liz MD    Discharge Diagnosis:   Principal Problem:    Transient weakness of left lower extremity  Active Problems:    Weakness of left upper extremity    Acute chest pain  Resolved Problems:    * No resolved hospital problems. *       Consultations:  IP CONSULT TO TELE-NEUROLOGY  IP CONSULT TO TELE-NEUROLOGY  IP CONSULT TO CARDIOLOGY      Brief Admission History/Reason for Admission Per Hallie Chavira MD:   Atypical chest pain    Brief Hospital Course by Main Problems:   Cece Navarro is a 58 y.o.  female with PMHx significant for diabetes mellitus, CAD status post PCI x 4 (last PCI in 2016), hypertension, hyperlipidemia, obesity, urge incontinence, peripheral neuropathy, and osteoarthritis of both knees who presented to the ED for evaluation of chest pain, however on presentation to the ED developed left upper and lower extremity numbness and tingliness, for which a stroke alert was called. Patient was in her baseline state of health until 9/30 around 7:30 PM started experiencing left-sided chest pain. Described pain as \"crushing\" in nature 8 out of 10, nonradiating, no alleviating or exacerbating factors. Then upon presented to the ED, she suddenly developed  left upper and lower extremity numbness, denied any weakness. She denies any falls, loss of consciousness, seizures.  Her symptoms of numbness has improved.  Denies any dysphagia, dysarthria, anosmia or any vision abnormalities.     In the ED, patient was hypertensive to 170s, normal heart rate, normal respiratory rate, afebrile.  A CT of the head was done which did not show any intracranial abnormalities, CTA of the head and neck had no vascular disease, however CT perfusion of the head shows questionable perfusion mismatch in the right

## 2024-10-01 NOTE — PROGRESS NOTES
4 Eyes Skin Assessment     NAME:  Cece Navarro  YOB: 1965  MEDICAL RECORD NUMBER:  [id number]    The patient is being assessed for  Admission    I agree that at least one RN has performed a thorough Head to Toe Skin Assessment on the patient. ALL assessment sites listed below have been assessed.      Areas assessed by both nurses:    Shoulders, Back, Chest, Arms, Elbows, Hands, Sacrum. Buttock, Coccyx, Ischium, and Legs. Feet and Heels        Does the Patient have a Wound? No noted wound(s)       Conor Prevention initiated by RN: Yes  Wound Care Orders initiated by RN: No    Pressure Injury (Stage 3,4, Unstageable, DTI, NWPT, and Complex wounds) if present, place Wound referral order by RN under : No    New Ostomies, if present place, Ostomy referral order under : No     Nurse 1 eSignature: Electronically signed by Coretta Muro RN on 10/1/24 at 4:42 AM EDT    **SHARE this note so that the co-signing nurse can place an eSignature**    Nurse 2 eSignature: Electronically signed by Nakita Wilson RN on 10/1/24 at 5:49 AM EDT

## 2024-10-01 NOTE — CONSULTS
Consult    NAME: Cece Navarro   :  1965   MRN:  [id number]     Date/Time:  10/1/2024 11:22 AM    Patient PCP: Ashwin Orellana MD  ________________________________________________________________________      Subjective:   CHIEF COMPLAINT: Chest pain.    HISTORY OF PRESENT ILLNESS:   Patient stated that around 730 last night she experienced pain in the chest and she described pain into left parasternal border and precordial area.  She has a history of stent and she claims that she has received 4 stents starting from  and last time was .  She stated that pain lasted for few hours and subsequently she started feeling numbness of left upper extremity and lower extremity though she denied any speech problem or facial asymmetry and she presented to emergency room.           Past Medical History:   Diagnosis Date    Asthma     CAD (coronary artery disease)     Dr Brady    Diabetes (HCC)     Hypercholesterolemia     Presence of stent in coronary artery     x 4    Sleep apnea     no cpap      Past Surgical History:   Procedure Laterality Date    CARPAL TUNNEL RELEASE Left     COLONOSCOPY      needs soon, incomplete    COLONOSCOPY N/A 10/11/2023    COLONOSCOPY performed by Shawn Julian MD at [facility] ENDOSCOPY    CORONARY ANGIOPLASTY WITH STENT PLACEMENT      x 4    CYSTOSCOPY N/A 12/15/2023    CYSTOURETHROSCOPY WITH INJECTION OF BOTOX INTO THE BLADDER performed by Levi Young MD at [facility] MAIN OR    CYSTOSCOPY N/A 2024    CYSTOURETHROSCOPY WITH INJECTION OF BOTOX INTO THE BLADDER performed by Levi Young MD at [facility] MAIN OR    ROTATOR CUFF REPAIR Left 2020     Allergies   Allergen Reactions    Cyclobenzaprine Itching    Prednisone Other (See Comments)     Yeast infection      Meds:  See below  Social History     Tobacco Use    Smoking status: Former     Current packs/day: 0.00     Types: Cigarettes     Quit date: 2005     Years since quittin.7    Smokeless 
and the patient's location.? Aspects of the evaluation that could not be adequately evaluated by virtual assessment were shared with both the patient and the consulting provider.?     A total of 50 minutes of time was spent in direct care and coordination of care with the patient, of which 50% of the time was spent in reviewing pertinent medical records, test results, discussing and counseling treatment with patient, family and treatment team

## 2024-10-01 NOTE — PLAN OF CARE
PHYSICAL THERAPY EVALUATION  Patient: Cece Navarro (58 y.o. female)  Date: 10/1/2024  Primary Diagnosis: TIA (transient ischemic attack) [G45.9]  Acute chest pain [R07.9]  Stroke-like symptoms [R29.90]       Precautions: Fall Risk                      Recommendations for nursing mobility: Out of bed to chair for meals, Encourage HEP in prep for ADLs/mobility; see handout for details, Use of bed/chair alarm for safety, AD and gt belt for bed to chair , Amb to bathroom with AD and gait belt, and Assist x1    In place during session: EKG/telemetry     ASSESSMENT  Pt is a 58 y.o. female admitted on 9/30/2024 for chest pain, developed LUE numbness/tingling on presentation to the ED; PMHx DM, CAD s/p PCI x4, HTN, HLD, obesity, urge incontinence, peripheral neuropathy, OA of both knees; pt currently being treated for transient weakness of LLE . Pt semi supine upon PT arrival, agreeable to evaluation. Pt A&O x 4.  Pt's sister present during PT eval via telephone with pt permission.    Based on the objective data described below, the patient currently presents with impaired functional mobility, decreased independence in ADLs, impaired strength, decreased activity tolerance, impaired balance, and increased pain levels. (See below for objective details and assist levels).     Overall pt tolerated session fair today with c/o 5/10 chest pain at rest, unchanged with functional mobility. Pt completed bed mobility with Ella, and required SBA-CGA for transfers. On initial stand, pt collapsed to bed d/t L knee buckling; pt reports that she has chronic L knee instability and 2 falls last week 2/2 L knee buckling. Pt amb 15' with RW, gait belt and CGA; demos decreased step clearance and slow mikey however no overt LOB noted. Pt denies numbness/tingling throughout. Pt handed off to OT on commode with all needs met and in no acute distress. Pt will benefit from continued skilled PT to address above deficits and return to PLOF. Current

## 2024-10-01 NOTE — PLAN OF CARE
Problem: Chronic Conditions and Co-morbidities  Goal: Patient's chronic conditions and co-morbidity symptoms are monitored and maintained or improved  Outcome: Progressing  Flowsheets (Taken 10/1/2024 0432)  Care Plan - Patient's Chronic Conditions and Co-Morbidity Symptoms are Monitored and Maintained or Improved: Monitor and assess patient's chronic conditions and comorbid symptoms for stability, deterioration, or improvement     Problem: Discharge Planning  Goal: Discharge to home or other facility with appropriate resources  Outcome: Progressing  Flowsheets (Taken 10/1/2024 0432)  Discharge to home or other facility with appropriate resources: Identify barriers to discharge with patient and caregiver     Problem: Pain  Goal: Verbalizes/displays adequate comfort level or baseline comfort level  Outcome: Progressing     Problem: Skin/Tissue Integrity  Goal: Absence of new skin breakdown  Description: 1.  Monitor for areas of redness and/or skin breakdown  2.  Assess vascular access sites hourly  3.  Every 4-6 hours minimum:  Change oxygen saturation probe site  4.  Every 4-6 hours:  If on nasal continuous positive airway pressure, respiratory therapy assess nares and determine need for appliance change or resting period.  Outcome: Progressing     Problem: Safety - Adult  Goal: Free from fall injury  Outcome: Progressing

## 2024-10-01 NOTE — PROGRESS NOTES
IV removed. Patient discharging home with home health. Discharge instructions reviewed with patient, no further questions at this time. Patient's ride is at the front entrance and PCT taking patient downstairs via wheelchair.

## 2024-10-01 NOTE — PROGRESS NOTES
PT eval order received and acknowledged. PT eval attempted at 0856 however pt currently KYLE at MRI. Will continue to follow patient and attempt PT eval at a later time. Thank you.

## 2024-10-01 NOTE — PLAN OF CARE
OCCUPATIONAL THERAPY EVALUATION  Patient: Cece Navarro (58 y.o. female)  Date: 10/1/2024  Primary Diagnosis: TIA (transient ischemic attack) [G45.9]  Acute chest pain [R07.9]  Stroke-like symptoms [R29.90]       Precautions: Fall Risk                Recommendations for nursing mobility: Out of bed to chair for meals, Encourage HEP in prep for ADLs/mobility; see handout for details, AD and gt belt for bed to chair , and Amb to bathroom with AD and gait belt    In place during session:None  ASSESSMENT  Pt is a 58 y.o. female presenting to [facility] with c/o chest pain. Pt admitted on 9/30/24 and currently being treated for transient weakness of LLE Pt has a PMHx of DM,CAD s/p PCI x4, HTN, HLD, obesity, urge incontinence, peripheral neuropathy, OA of both knees. Pt received semi-supine in bed upon arrival, AXO x4, and agreeable to OT evaluation.     Based on current observations, pt presents with decreased  functional mobility, independence in ADLs, body mechanics, activity tolerance (see below for objective details and assist levels).     Overall, pt tolerates session fair with c/o chest pain rating at 5/10 at rest, and did not change during functional mobility. Pt with L knee instability and required frequent rest breaks and cueing for balance. Pt completed functional mobility from bed to toilet with gt belt and CGA requiring extra time due to fatigue. Pt required MIN-CGA for toilet transfer with cueing for improved safety with use of RW and grab bars. Pt completed toilet hygiene independently. While standing at sink, pt was able to perform oral hygiene independently with CGA to maintain safe balance. Pt required CGA to return back to bedside chair. Pt was able to doff pants with supervision and required min-supervision for donning briefs in standing. Pt with no LOB noted during this task. Pt will benefit from continued skilled OT services to address current impairments and improve IND and safety with self cares and

## 2024-10-01 NOTE — PROGRESS NOTES
OT eval order received and acknowledged. OT eval attempted at 936 however pt off the unit for testing. Will continue to follow patient and attempt OT eval at a later time. Thank you.

## 2024-10-02 LAB
ECHO BSA: 2.49 M2
ECHO LV EF PHYSICIAN: 60 %

## 2024-10-07 ENCOUNTER — OFFICE VISIT (OUTPATIENT)
Age: 59
End: 2024-10-07
Payer: MEDICARE

## 2024-10-07 VITALS — SYSTOLIC BLOOD PRESSURE: 124 MMHG | HEART RATE: 61 BPM | DIASTOLIC BLOOD PRESSURE: 72 MMHG

## 2024-10-07 DIAGNOSIS — N39.41 URGE INCONTINENCE OF URINE: ICD-10-CM

## 2024-10-07 DIAGNOSIS — N39.41 URGE URINARY INCONTINENCE: ICD-10-CM

## 2024-10-07 DIAGNOSIS — N95.2 ATROPHIC VAGINITIS: ICD-10-CM

## 2024-10-07 DIAGNOSIS — N32.81 OVERACTIVE BLADDER: ICD-10-CM

## 2024-10-07 DIAGNOSIS — N30.90 CYSTITIS: Primary | ICD-10-CM

## 2024-10-07 DIAGNOSIS — R35.0 FREQUENCY OF MICTURITION: ICD-10-CM

## 2024-10-07 LAB
BILIRUBIN, URINE, POC: NEGATIVE
BLOOD URINE, POC: NEGATIVE
GLUCOSE URINE, POC: 500
KETONES, URINE, POC: NEGATIVE
LEUKOCYTE ESTERASE, URINE, POC: NEGATIVE
NITRITE, URINE, POC: NEGATIVE
PH, URINE, POC: 6 (ref 4.6–8)
PROTEIN,URINE, POC: NEGATIVE
SPECIFIC GRAVITY, URINE, POC: 1.02 (ref 1–1.03)
URINALYSIS CLARITY, POC: CLEAR
URINALYSIS COLOR, POC: YELLOW
UROBILINOGEN, POC: NORMAL

## 2024-10-07 PROCEDURE — G8417 CALC BMI ABV UP PARAM F/U: HCPCS | Performed by: UROLOGY

## 2024-10-07 PROCEDURE — G8427 DOCREV CUR MEDS BY ELIG CLIN: HCPCS | Performed by: UROLOGY

## 2024-10-07 PROCEDURE — 81003 URINALYSIS AUTO W/O SCOPE: CPT | Performed by: UROLOGY

## 2024-10-07 PROCEDURE — 3078F DIAST BP <80 MM HG: CPT | Performed by: UROLOGY

## 2024-10-07 PROCEDURE — 3017F COLORECTAL CA SCREEN DOC REV: CPT | Performed by: UROLOGY

## 2024-10-07 PROCEDURE — 99214 OFFICE O/P EST MOD 30 MIN: CPT | Performed by: UROLOGY

## 2024-10-07 PROCEDURE — 1036F TOBACCO NON-USER: CPT | Performed by: UROLOGY

## 2024-10-07 PROCEDURE — 3074F SYST BP LT 130 MM HG: CPT | Performed by: UROLOGY

## 2024-10-07 PROCEDURE — G8484 FLU IMMUNIZE NO ADMIN: HCPCS | Performed by: UROLOGY

## 2024-10-07 RX ORDER — MIRABEGRON 25 MG/1
50 TABLET, FILM COATED, EXTENDED RELEASE ORAL DAILY
Qty: 90 TABLET | Refills: 3 | Status: SHIPPED | OUTPATIENT
Start: 2024-10-07

## 2024-10-07 RX ORDER — OXYBUTYNIN CHLORIDE 10 MG/1
10 TABLET, EXTENDED RELEASE ORAL
Qty: 90 TABLET | Refills: 3 | Status: SHIPPED | OUTPATIENT
Start: 2024-10-07

## 2024-10-07 NOTE — PROGRESS NOTES
Chief Complaint   Patient presents with    Follow-up        /72   Pulse 61      PHQ-9 score is    Negative      1. \"Have you been to the ER, urgent care clinic since your last visit?  Hospitalized since your last visit?\" No    2. \"Have you seen or consulted any other health care providers outside of the [facility] since your last visit?\" No     3. For patients aged 45-75: Has the patient had a colonoscopy / FIT/ Cologuard? Yes - no Care Gap present      If the patient is female:    4. For patients aged 40-74: Has the patient had a mammogram within the past 2 years? Yes - no Care Gap present      5. For patients aged 21-65: Has the patient had a pap smear? Yes - no Care Gap present

## 2024-10-07 NOTE — PROGRESS NOTES
HISTORY OF PRESENT ILLNESS  Cece Navarro is a 58 y.o. female   Cece returns today in somewhat problematic.  You tried multiple medications to help her with the bladder does not work she has had Botox x 2 is a question with a cystocele.  She has tried the beta 3 agonist and the anticholinergics and the Estrace cream Estrace cream made her itch too much so she stopped I will reexamine her bladder and see if there is something else I can do I am not sure what it will be at this point her urine does not appear to be infected the Serratia is been treated in the urine showed no white and red cells  1. Cystitis  Overview:  9/17/2024  Urine culture positive for Serratia marcescens   Greater than 100,000 colony forming units per mL   Tx with cipro  Orders:  -     AMB POC URINALYSIS DIP STICK AUTO W/O MICRO  2. Atrophic vaginitis  Overview:  Tx with estrace cream  Orders:  -     AMB POC URINALYSIS DIP STICK AUTO W/O MICRO  3. Overactive bladder  Overview:  9/17/2024 Urgency and frequency, postive for UTI,Tx with cipro  Orders:  -     AMB POC URINALYSIS DIP STICK AUTO W/O MICRO  4. Urge urinary incontinence  -     oxyBUTYnin (DITROPAN-XL) 10 MG extended release tablet; Take 1 tablet by mouth nightly, Disp-90 tablet, R-3Normal  -     mirabegron (MYRBETRIQ) 25 MG TB24; Take 2 tablets by mouth daily, Disp-90 tablet, R-3Normal  5. Frequency of micturition  -     oxyBUTYnin (DITROPAN-XL) 10 MG extended release tablet; Take 1 tablet by mouth nightly, Disp-90 tablet, R-3Normal  -     mirabegron (MYRBETRIQ) 25 MG TB24; Take 2 tablets by mouth daily, Disp-90 tablet, R-3Normal  6. Urge incontinence of urine        PAST MEDICAL HISTORY  PMHx (including negatives):  has a past medical history of Asthma, CAD (coronary artery disease), Diabetes (HCC), Hypercholesterolemia, Presence of stent in coronary artery, and Sleep apnea.   PSurgHx:  has a past surgical history that includes Carpal tunnel release (Left, 2007); Colonoscopy; Rotator

## 2024-10-14 ENCOUNTER — HOSPITAL ENCOUNTER (EMERGENCY)
Facility: HOSPITAL | Age: 59
Discharge: HOME | End: 2024-10-14
Payer: MEDICARE

## 2024-10-14 ENCOUNTER — APPOINTMENT (OUTPATIENT)
Facility: HOSPITAL | Age: 59
End: 2024-10-14
Payer: MEDICARE

## 2024-10-14 VITALS
SYSTOLIC BLOOD PRESSURE: 136 MMHG | RESPIRATION RATE: 20 BRPM | TEMPERATURE: 98.4 F | OXYGEN SATURATION: 98 % | HEIGHT: 70 IN | WEIGHT: 278 LBS | HEART RATE: 60 BPM | BODY MASS INDEX: 39.8 KG/M2 | DIASTOLIC BLOOD PRESSURE: 71 MMHG

## 2024-10-14 DIAGNOSIS — M54.50 ACUTE BILATERAL LOW BACK PAIN WITHOUT SCIATICA: Primary | ICD-10-CM

## 2024-10-14 PROCEDURE — 6360000002 HC RX W HCPCS

## 2024-10-14 PROCEDURE — 99284 EMERGENCY DEPT VISIT MOD MDM: CPT

## 2024-10-14 PROCEDURE — 6370000000 HC RX 637 (ALT 250 FOR IP)

## 2024-10-14 PROCEDURE — 96372 THER/PROPH/DIAG INJ SC/IM: CPT

## 2024-10-14 PROCEDURE — 72100 X-RAY EXAM L-S SPINE 2/3 VWS: CPT

## 2024-10-14 RX ORDER — LIDOCAINE 50 MG/G
1 PATCH TOPICAL DAILY
Qty: 10 PATCH | Refills: 0 | Status: SHIPPED | OUTPATIENT
Start: 2024-10-14 | End: 2024-10-24

## 2024-10-14 RX ORDER — KETOROLAC TROMETHAMINE 30 MG/ML
30 INJECTION, SOLUTION INTRAMUSCULAR; INTRAVENOUS
Status: DISCONTINUED | OUTPATIENT
Start: 2024-10-14 | End: 2024-10-14

## 2024-10-14 RX ORDER — KETOROLAC TROMETHAMINE 10 MG/1
10 TABLET, FILM COATED ORAL EVERY 6 HOURS PRN
Qty: 20 TABLET | Refills: 0 | Status: SHIPPED | OUTPATIENT
Start: 2024-10-14

## 2024-10-14 RX ORDER — KETOROLAC TROMETHAMINE 30 MG/ML
30 INJECTION, SOLUTION INTRAMUSCULAR; INTRAVENOUS
Status: COMPLETED | OUTPATIENT
Start: 2024-10-14 | End: 2024-10-14

## 2024-10-14 RX ORDER — METHOCARBAMOL 750 MG/1
750 TABLET, FILM COATED ORAL 4 TIMES DAILY
Qty: 40 TABLET | Refills: 0 | Status: SHIPPED | OUTPATIENT
Start: 2024-10-14 | End: 2024-10-24

## 2024-10-14 RX ORDER — LIDOCAINE 4 G/G
1 PATCH TOPICAL DAILY
Status: DISCONTINUED | OUTPATIENT
Start: 2024-10-14 | End: 2024-10-14 | Stop reason: HOSPADM

## 2024-10-14 RX ADMIN — KETOROLAC TROMETHAMINE 30 MG: 30 INJECTION, SOLUTION INTRAMUSCULAR; INTRAVENOUS at 14:37

## 2024-10-14 ASSESSMENT — PAIN - FUNCTIONAL ASSESSMENT: PAIN_FUNCTIONAL_ASSESSMENT: 0-10

## 2024-10-14 ASSESSMENT — PAIN SCALES - GENERAL: PAINLEVEL_OUTOF10: 10

## 2024-10-14 NOTE — ED PROVIDER NOTES
9:40 AM   Result Value Ref Range    Color, Urine, POC Dark Yellow     Clarity, Urine, POC Clear     Glucose, Urine,      Bilirubin, Urine, POC Negative     Ketones, Urine, POC Negative     Specific Gravity, Urine, POC 1.025 1.001 - 1.035    Blood, Urine, POC Negative Negative    pH, Urine, POC 5.0 4.6 - 8.0    Protein, Urine, POC Negative     Urobilinogen, POC 0.2 mg/dL     Nitrite, Urine, POC Negative     Leukocyte Esterase, Urine, POC Negative        EKG: Not Applicable    Radiologic Studies:  Non-plain film images such as CT, Ultrasound and MRI are read by the radiologist. Plain radiographic images are visualized and preliminarily interpreted by the ED Physician with the following findings: See ED Course Below    Interpretation per the Radiologist below, if available at the time of this note:  XR LUMBAR SPINE (2-3 VIEWS)   Final Result   No findings of acute lumbar spinal trauma.         Electronically signed by Gaurav Roman           ED COURSE and DIFFERENTIAL DIAGNOSIS/MDM   11:04 AM Differential and Considerations: The patient complains of low back pain. These symptoms are consistent with a lumbar strain. Less likely  pathology, aortic dissection or ruptured AAA, or cauda equina given that there are no red flags and a benign physical exam.  I considered labwork and imaging on this patient, however not indicated at this time.     I have recommended rest, avoiding heavy lifting until better, use of intermittent heat (avoid sleeping on a heating pad), and use of OTC NSAID's (Advil, Aleve etc) or Tylenol prn for pain. Call PCP if back pain persists or she develops leg symptoms.  It has also been explained that this may take up to three months to fully resolved and that smoking may slow that process even more.    I considered, but did not perform, additional testing such as CT, MRI Spine , as well as admission or transfer to a higher level of care.     I utilized an evidence-based risk rating tool (CMT)

## 2024-10-14 NOTE — ED TRIAGE NOTES
Pt arrives to ED with family. Pt reports R hip gave out this morning and caused her to fall. Pt reports 10/10 lower back pain. Denies hitting head, denies LOC, denies blood thinners.

## 2024-10-14 NOTE — DISCHARGE INSTRUCTIONS
Thank you for choosing our Emergency Department for your care.  It is our privilege to care for you in your time of need.  In the next several days, you may receive a survey via email or mailed to your home about your experience with our team.  We would greatly appreciate you taking a few minutes to complete the survey, as we use this information to learn what we have done well and what we could be doing better. Thank you for trusting us with your care!    Below you will find a list of your tests from today's visit.   Labs  No results found for this or any previous visit (from the past 12 hour(s)).    Radiologic Studies  XR LUMBAR SPINE (2-3 VIEWS)   Final Result   No findings of acute lumbar spinal trauma.         Electronically signed by Gaurav Roman        ------------------------------------------------------------------------------------------------------------  The evaluation and treatment you received in the Emergency Department were for an urgent problem. It is important that you follow-up with a doctor, nurse practitioner, or physician assistant to:  (1) confirm your diagnosis,  (2) re-evaluation of changes in your illness and treatment, and (3) for ongoing care. Please take your discharge instructions with you when you go to your follow-up appointment.     If you have any problem arranging a follow-up appointment, contact us!  If your symptoms become worse or you do not improve as expected, please return to us. We are available 24 hours a day.     If a prescription has been provided, please fill it as soon as possible to prevent a delay in treatment. If you have any questions or reservations about taking the medication due to side effects or interactions with other medications, please call your primary care provider or contact us directly.  Again, THANK YOU for choosing us to care for YOU!

## 2024-10-15 ENCOUNTER — PROCEDURE VISIT (OUTPATIENT)
Age: 59
End: 2024-10-15
Payer: MEDICARE

## 2024-10-15 VITALS — DIASTOLIC BLOOD PRESSURE: 73 MMHG | HEART RATE: 62 BPM | SYSTOLIC BLOOD PRESSURE: 125 MMHG

## 2024-10-15 DIAGNOSIS — G45.9 TIA (TRANSIENT ISCHEMIC ATTACK): ICD-10-CM

## 2024-10-15 DIAGNOSIS — N32.81 OVERACTIVE BLADDER: ICD-10-CM

## 2024-10-15 DIAGNOSIS — N39.41 URGE URINARY INCONTINENCE: ICD-10-CM

## 2024-10-15 DIAGNOSIS — N95.2 ATROPHIC VAGINITIS: Primary | ICD-10-CM

## 2024-10-15 DIAGNOSIS — N39.41 URGE INCONTINENCE: ICD-10-CM

## 2024-10-15 DIAGNOSIS — E66.01 SEVERE OBESITY (BMI 35.0-39.9) WITH COMORBIDITY: ICD-10-CM

## 2024-10-15 DIAGNOSIS — R35.0 FREQUENCY OF MICTURITION: ICD-10-CM

## 2024-10-15 LAB
BILIRUBIN, URINE, POC: NEGATIVE
BLOOD URINE, POC: NEGATIVE
GLUCOSE URINE, POC: 500
KETONES, URINE, POC: NEGATIVE
LEUKOCYTE ESTERASE, URINE, POC: NEGATIVE
NITRITE, URINE, POC: NEGATIVE
PH, URINE, POC: 5 (ref 4.6–8)
PROTEIN,URINE, POC: NEGATIVE
PVR, POC: NORMAL CC
SPECIFIC GRAVITY, URINE, POC: 1.02 (ref 1–1.03)
URINALYSIS CLARITY, POC: CLEAR
URINALYSIS COLOR, POC: NORMAL
UROBILINOGEN, POC: NORMAL

## 2024-10-15 PROCEDURE — 99213 OFFICE O/P EST LOW 20 MIN: CPT | Performed by: UROLOGY

## 2024-10-15 PROCEDURE — 51798 US URINE CAPACITY MEASURE: CPT | Performed by: UROLOGY

## 2024-10-15 PROCEDURE — G8427 DOCREV CUR MEDS BY ELIG CLIN: HCPCS | Performed by: UROLOGY

## 2024-10-15 PROCEDURE — 3017F COLORECTAL CA SCREEN DOC REV: CPT | Performed by: UROLOGY

## 2024-10-15 PROCEDURE — 3074F SYST BP LT 130 MM HG: CPT | Performed by: UROLOGY

## 2024-10-15 PROCEDURE — 81003 URINALYSIS AUTO W/O SCOPE: CPT | Performed by: UROLOGY

## 2024-10-15 PROCEDURE — G8417 CALC BMI ABV UP PARAM F/U: HCPCS | Performed by: UROLOGY

## 2024-10-15 PROCEDURE — 51725 SIMPLE CYSTOMETROGRAM: CPT | Performed by: UROLOGY

## 2024-10-15 PROCEDURE — 3078F DIAST BP <80 MM HG: CPT | Performed by: UROLOGY

## 2024-10-15 PROCEDURE — G8484 FLU IMMUNIZE NO ADMIN: HCPCS | Performed by: UROLOGY

## 2024-10-15 PROCEDURE — 52000 CYSTOURETHROSCOPY: CPT | Performed by: UROLOGY

## 2024-10-15 PROCEDURE — 51741 ELECTRO-UROFLOWMETRY FIRST: CPT | Performed by: UROLOGY

## 2024-10-15 PROCEDURE — 1036F TOBACCO NON-USER: CPT | Performed by: UROLOGY

## 2024-10-15 RX ORDER — CIPROFLOXACIN 500 MG/1
500 TABLET, FILM COATED ORAL ONCE
Status: COMPLETED | OUTPATIENT
Start: 2024-10-15 | End: 2024-10-15

## 2024-10-15 RX ADMIN — CIPROFLOXACIN 500 MG: 500 TABLET, FILM COATED ORAL at 15:45

## 2024-10-15 NOTE — PROGRESS NOTES
HISTORY OF PRESENT ILLNESS  Cece Navarro is a 58 y.o. female   Patient returns today for evaluation.  She has had 2 Botox injections she has been on multiple medications to help her urge incontinence.  She has been on beta 3 agonist has been anticholinergics none of them have really solved her issues.  Urodynamics today was relatively unremarkable she had the feeling she had to go medium amount of fluid.  No abnormal contractions in between the void and feeling she had to go.  I am going to send her to Dr. Smith for evaluation for neurostimulator.  Patient already does have some back issues and may be the best treatment for her  1. Atrophic vaginitis  Overview:  Tx with estrace cream  2. Urge incontinence  Overview:  urge incontinence, use of depends. Denies stress incontinence.  Nocturia x 2-3 and frequency x 5-7.  On Estrace cream, Oxybutynin, and Mybetriq were not effective.    12/15/2023-CYSTOURETHROSCOPY WITH INJECTION OF BOTOX INTO THE BLADDER    7/23/2024-CYSTOURETHROSCOPY WITH INJECTION OF BOTOX INTO THE BLADDER   Findings: small cystocele   Planing possible repair of cystocele    She has been tried on Oxybutynin, and Mybetriq, had 2 bladder Botox injections with no improvement in her symptoms     Orders:  -     AMB POC URINALYSIS DIP STICK AUTO W/O MICRO  -     ciprofloxacin (CIPRO) tablet 500 mg; 500 mg, Oral, ONCE, 1 dose, On Tue 10/15/24 at 1000Antimicrobial Indications: Surgical ProphylaxisDo not take with dairy products or calcium-fortified juices. Tube feeding (TF) interaction, obtain physician order to manage. Recommend holding TF for 1 hr before and 1 hr after dose. Due to decreased absorption do not give by J tube.     3. Frequency of micturition  4. Urge urinary incontinence  5. Overactive bladder  Overview:  9/17/2024 Urgency and frequency, postive for UTI,Tx with cipro  6. TIA (transient ischemic attack)  7. Severe obesity (BMI 35.0-39.9) with comorbidity        PAST MEDICAL HISTORY  PMHx

## 2024-10-15 NOTE — PROGRESS NOTES
Chief Complaint   Patient presents with    Procedure        /73   Pulse 62      PHQ-9 score is    Negative      1. \"Have you been to the ER, urgent care clinic since your last visit?  Hospitalized since your last visit?\" No    2. \"Have you seen or consulted any other health care providers outside of the [facility] since your last visit?\" No     3. For patients aged 45-75: Has the patient had a colonoscopy / FIT/ Cologuard? Yes - no Care Gap present      If the patient is female:    4. For patients aged 40-74: Has the patient had a mammogram within the past 2 years? Yes - no Care Gap present      5. For patients aged 21-65: Has the patient had a pap smear? Yes - no Care Gap present

## 2024-10-15 NOTE — PROGRESS NOTES
OFFICE CYSTOSCOPY    The patient presented for cystoscopy and was placed supine on the procedure table.  The genitals were prepped with chlorahexadine and a Urojet.  Using a 16F flexible cystoscope, cystourerthroscopy was performed.  Cystoscopy - Female    Findings:  Initial residual: minimal  Anterior urethra: normal mucosa  Bladder neck: Normal appearing  Bladder mucosa: Intact, no bas fond deformity, did not descend with strain  Trabeculation: none  Diverticula: none  Ureteral orifices: normal, efflux clear urine    CMG    Initial urge at (cc): 250  Strong urge at (cc): 300  Findings: No uninhibited contractions noted.  No urge related incontinence noted    Uroflow/ PVR    Max Flow: 11 ml/sec    Avg flow: 5 ml/sec    Voided Volume:  243ml    Residual Volume:38ml    Shape of the curve: sinusoidal pattern    Impression: Patient you has an obstruction pattern or a sphincter dyssynergia because she has a peak then a valley then peaks and a Valley continuous.  I think she might benefit from a neural stimulator will refer to Dr. Smith, placed a copy of the graft on Dr. Smith's desk.

## 2024-10-24 PROBLEM — N39.41 URGE URINARY INCONTINENCE: Status: RESOLVED | Noted: 2023-12-08 | Resolved: 2024-10-24

## 2024-10-24 PROBLEM — N81.4 UTEROVAGINAL PROLAPSE: Status: RESOLVED | Noted: 2022-02-17 | Resolved: 2024-10-24

## 2024-10-28 ENCOUNTER — INITIAL CONSULT (OUTPATIENT)
Age: 59
End: 2024-10-28
Payer: MEDICARE

## 2024-10-28 VITALS
HEIGHT: 70 IN | SYSTOLIC BLOOD PRESSURE: 114 MMHG | WEIGHT: 278 LBS | BODY MASS INDEX: 39.8 KG/M2 | DIASTOLIC BLOOD PRESSURE: 72 MMHG | HEART RATE: 64 BPM

## 2024-10-28 DIAGNOSIS — N39.41 URGE INCONTINENCE: Primary | ICD-10-CM

## 2024-10-28 DIAGNOSIS — N32.81 OVERACTIVE BLADDER: ICD-10-CM

## 2024-10-28 PROCEDURE — 1036F TOBACCO NON-USER: CPT | Performed by: STUDENT IN AN ORGANIZED HEALTH CARE EDUCATION/TRAINING PROGRAM

## 2024-10-28 PROCEDURE — 3074F SYST BP LT 130 MM HG: CPT | Performed by: STUDENT IN AN ORGANIZED HEALTH CARE EDUCATION/TRAINING PROGRAM

## 2024-10-28 PROCEDURE — G8484 FLU IMMUNIZE NO ADMIN: HCPCS | Performed by: STUDENT IN AN ORGANIZED HEALTH CARE EDUCATION/TRAINING PROGRAM

## 2024-10-28 PROCEDURE — G8417 CALC BMI ABV UP PARAM F/U: HCPCS | Performed by: STUDENT IN AN ORGANIZED HEALTH CARE EDUCATION/TRAINING PROGRAM

## 2024-10-28 PROCEDURE — 99214 OFFICE O/P EST MOD 30 MIN: CPT | Performed by: STUDENT IN AN ORGANIZED HEALTH CARE EDUCATION/TRAINING PROGRAM

## 2024-10-28 PROCEDURE — 3017F COLORECTAL CA SCREEN DOC REV: CPT | Performed by: STUDENT IN AN ORGANIZED HEALTH CARE EDUCATION/TRAINING PROGRAM

## 2024-10-28 PROCEDURE — 3078F DIAST BP <80 MM HG: CPT | Performed by: STUDENT IN AN ORGANIZED HEALTH CARE EDUCATION/TRAINING PROGRAM

## 2024-10-28 PROCEDURE — G8427 DOCREV CUR MEDS BY ELIG CLIN: HCPCS | Performed by: STUDENT IN AN ORGANIZED HEALTH CARE EDUCATION/TRAINING PROGRAM

## 2024-10-28 NOTE — ASSESSMENT & PLAN NOTE
Chronic, not at goal.  Continue oxybutynin 5 mg.  Will schedule for [employer] PNE in the operating room.

## 2024-10-28 NOTE — PROGRESS NOTES
Chief Complaint   Patient presents with    Consultation     1. Have you been to the ER, urgent care clinic since your last visit?  Hospitalized since your last visit?No    2. Have you seen or consulted any other health care providers outside of the [facility] since your last visit?  Include any pap smears or colon screening. No  /72 (Site: Left Upper Arm, Position: Sitting, Cuff Size: Large Adult)   Pulse 64   Ht 1.778 m (5' 10\")   Wt 126.1 kg (278 lb)   BMI 39.89 kg/m²

## 2024-10-28 NOTE — PROGRESS NOTES
HISTORY OF PRESENT ILLNESS  Cece Navarro is a 58 y.o. female.  Chief Complaint   Patient presents with    Consultation       58-year-old female followed by Dr. Young for severe overactive bladder and urge incontinence.  She uses multiple depends and pads per day to manage her drainage.  She has been trialed on multiple medications which have not been successful.  She had a recent uroflow done which showed a stuttering type of voiding pattern.        PAST MEDICAL HISTORY:  PMHx (including negatives):  has a past medical history of Asthma, CAD (coronary artery disease), Diabetes (HCC), Hypercholesterolemia, Presence of stent in coronary artery, and Sleep apnea.   PSurgHx:  has a past surgical history that includes Carpal tunnel release (Left, 2007); Colonoscopy; Rotator cuff repair (Left, 11/06/2020); Coronary angioplasty with stent; Colonoscopy (N/A, 10/11/2023); Cystoscopy (N/A, 12/15/2023); Cystoscopy (N/A, 07/23/2024); and Cystocopy (10/15/2024).  PSocHx:  reports that she quit smoking about 19 years ago. Her smoking use included cigarettes. She has never used smokeless tobacco. She reports current alcohol use. She reports that she does not use drugs.     Review of Systems      Physical Exam  Constitutional:       General: She is not in acute distress.     Appearance: She is not ill-appearing.   HENT:      Head: Normocephalic and atraumatic.   Eyes:      Extraocular Movements: Extraocular movements intact.      Pupils: Pupils are equal, round, and reactive to light.   Pulmonary:      Effort: Pulmonary effort is normal.   Musculoskeletal:         General: Normal range of motion.   Neurological:      General: No focal deficit present.      Mental Status: She is alert and oriented to person, place, and time.   Psychiatric:         Mood and Affect: Mood normal.         ASSESSMENT AND PLAN      1. Urge incontinence  Overview:  Urgency and UUI; using Depends. She has been tried on Oxybutynin, and Mybetriq, had 2

## 2024-10-29 ENCOUNTER — PREP FOR PROCEDURE (OUTPATIENT)
Age: 59
End: 2024-10-29

## 2024-10-29 DIAGNOSIS — N39.41 URGE INCONTINENCE: Primary | ICD-10-CM

## 2024-10-29 RX ORDER — SODIUM CHLORIDE 0.9 % (FLUSH) 0.9 %
5-40 SYRINGE (ML) INJECTION EVERY 12 HOURS SCHEDULED
Status: CANCELLED | OUTPATIENT
Start: 2024-10-29

## 2024-10-29 RX ORDER — SODIUM CHLORIDE 0.9 % (FLUSH) 0.9 %
5-40 SYRINGE (ML) INJECTION PRN
Status: CANCELLED | OUTPATIENT
Start: 2024-10-29

## 2024-10-29 RX ORDER — SODIUM CHLORIDE 9 MG/ML
INJECTION, SOLUTION INTRAVENOUS PRN
Status: CANCELLED | OUTPATIENT
Start: 2024-10-29

## 2024-10-30 ENCOUNTER — HOSPITAL ENCOUNTER (OUTPATIENT)
Facility: HOSPITAL | Age: 59
Discharge: HOME | End: 2024-11-02
Payer: MEDICARE

## 2024-10-30 VITALS
DIASTOLIC BLOOD PRESSURE: 75 MMHG | SYSTOLIC BLOOD PRESSURE: 143 MMHG | OXYGEN SATURATION: 99 % | HEIGHT: 70 IN | BODY MASS INDEX: 38.65 KG/M2 | RESPIRATION RATE: 18 BRPM | HEART RATE: 57 BPM | WEIGHT: 270 LBS | TEMPERATURE: 98.6 F

## 2024-10-30 DIAGNOSIS — N39.41 URGE INCONTINENCE: ICD-10-CM

## 2024-10-30 PROCEDURE — 87086 URINE CULTURE/COLONY COUNT: CPT

## 2024-10-30 ASSESSMENT — PAIN DESCRIPTION - LOCATION: LOCATION: BACK

## 2024-10-30 ASSESSMENT — PAIN SCALES - GENERAL: PAINLEVEL_OUTOF10: 6

## 2024-10-30 NOTE — PERIOP NOTE
Patient states she was instructed by MD to hold ASA 7 days prior to planned surgery     ASA hold request faxed to patient cardiologist

## 2024-10-31 LAB
BACTERIA SPEC CULT: NORMAL
Lab: NORMAL

## 2024-11-05 ENCOUNTER — TRANSCRIBE ORDERS (OUTPATIENT)
Facility: HOSPITAL | Age: 59
End: 2024-11-05

## 2024-11-05 DIAGNOSIS — I25.10 CORONARY ARTERY DISEASE INVOLVING NATIVE HEART, UNSPECIFIED VESSEL OR LESION TYPE, UNSPECIFIED WHETHER ANGINA PRESENT: Primary | ICD-10-CM

## 2024-11-05 DIAGNOSIS — Z01.810 PRE-OPERATIVE CARDIOVASCULAR EXAMINATION: ICD-10-CM

## 2024-11-08 ENCOUNTER — LAB (OUTPATIENT)
Age: 59
End: 2024-11-08

## 2024-11-08 DIAGNOSIS — E11.40 TYPE 2 DIABETES MELLITUS WITH DIABETIC NEUROPATHY, WITHOUT LONG-TERM CURRENT USE OF INSULIN (HCC): ICD-10-CM

## 2024-11-08 LAB
CREAT UR-MCNC: 110 MG/DL
EST. AVERAGE GLUCOSE BLD GHB EST-MCNC: 148 MG/DL
HBA1C MFR BLD: 6.8 % (ref 4–5.6)
LDLC SERPL DIRECT ASSAY-MCNC: 59 MG/DL (ref 0–100)
MICROALBUMIN UR-MCNC: 1.94 MG/DL
MICROALBUMIN/CREAT UR-RTO: 18 MG/G (ref 0–30)

## 2024-11-25 ENCOUNTER — HOSPITAL ENCOUNTER (OUTPATIENT)
Facility: HOSPITAL | Age: 59
Discharge: HOME | End: 2024-11-27
Attending: INTERNAL MEDICINE
Payer: MEDICARE

## 2024-11-25 ENCOUNTER — HOSPITAL ENCOUNTER (OUTPATIENT)
Facility: HOSPITAL | Age: 59
Discharge: HOME | End: 2024-11-28
Attending: INTERNAL MEDICINE
Payer: MEDICARE

## 2024-11-25 VITALS
SYSTOLIC BLOOD PRESSURE: 158 MMHG | BODY MASS INDEX: 38.65 KG/M2 | WEIGHT: 270 LBS | HEIGHT: 70 IN | DIASTOLIC BLOOD PRESSURE: 78 MMHG | HEART RATE: 57 BPM

## 2024-11-25 DIAGNOSIS — Z01.810 PRE-OPERATIVE CARDIOVASCULAR EXAMINATION: ICD-10-CM

## 2024-11-25 DIAGNOSIS — I25.10 CORONARY ARTERY DISEASE INVOLVING NATIVE HEART, UNSPECIFIED VESSEL OR LESION TYPE, UNSPECIFIED WHETHER ANGINA PRESENT: ICD-10-CM

## 2024-11-25 PROCEDURE — A9500 TC99M SESTAMIBI: HCPCS | Performed by: INTERNAL MEDICINE

## 2024-11-25 PROCEDURE — 3430000000 HC RX DIAGNOSTIC RADIOPHARMACEUTICAL: Performed by: INTERNAL MEDICINE

## 2024-11-25 PROCEDURE — 93017 CV STRESS TEST TRACING ONLY: CPT

## 2024-11-25 PROCEDURE — 6360000002 HC RX W HCPCS

## 2024-11-25 RX ORDER — REGADENOSON 0.08 MG/ML
INJECTION, SOLUTION INTRAVENOUS
Status: COMPLETED
Start: 2024-11-25 | End: 2024-11-25

## 2024-11-25 RX ORDER — TETRAKIS(2-METHOXYISOBUTYLISOCYANIDE)COPPER(I) TETRAFLUOROBORATE 1 MG/ML
29.3 INJECTION, POWDER, LYOPHILIZED, FOR SOLUTION INTRAVENOUS
Status: COMPLETED | OUTPATIENT
Start: 2024-11-25 | End: 2024-11-25

## 2024-11-25 RX ADMIN — TETRAKIS(2-METHOXYISOBUTYLISOCYANIDE)COPPER(I) TETRAFLUOROBORATE 29.3 MILLICURIE: 1 INJECTION, POWDER, LYOPHILIZED, FOR SOLUTION INTRAVENOUS at 10:45

## 2024-11-25 RX ADMIN — REGADENOSON 0.4 MG: 0.08 INJECTION, SOLUTION INTRAVENOUS at 10:43

## 2024-11-26 ENCOUNTER — HOSPITAL ENCOUNTER (OUTPATIENT)
Facility: HOSPITAL | Age: 59
Discharge: HOME | End: 2024-11-29
Attending: INTERNAL MEDICINE
Payer: MEDICARE

## 2024-11-26 LAB
ECHO BSA: 2.46 M2
STRESS BASELINE DIAS BP: 78 MMHG
STRESS BASELINE HR: 57 BPM
STRESS BASELINE SYS BP: 158 MMHG
STRESS ST DEPRESSION: 0 MM
STRESS STAGE 1 BP: NORMAL MMHG
STRESS STAGE 1 DURATION: NORMAL MIN:SEC
STRESS STAGE 1 HR: 78 BPM
STRESS STAGE 2 DURATION: 2 MIN:SEC
STRESS STAGE 2 HR: 76 BPM
STRESS STAGE 3 BP: NORMAL MMHG
STRESS STAGE 3 DURATION: 3 MIN:SEC
STRESS STAGE 3 HR: 69 BPM
STRESS STAGE 4 DURATION: 4 MIN:SEC
STRESS STAGE 4 HR: 67 BPM
STRESS TARGET HR: 162 BPM

## 2024-11-26 PROCEDURE — A9500 TC99M SESTAMIBI: HCPCS | Performed by: INTERNAL MEDICINE

## 2024-11-26 PROCEDURE — 3430000000 HC RX DIAGNOSTIC RADIOPHARMACEUTICAL: Performed by: INTERNAL MEDICINE

## 2024-11-26 RX ORDER — TETRAKIS(2-METHOXYISOBUTYLISOCYANIDE)COPPER(I) TETRAFLUOROBORATE 1 MG/ML
28.9 INJECTION, POWDER, LYOPHILIZED, FOR SOLUTION INTRAVENOUS
Status: COMPLETED | OUTPATIENT
Start: 2024-11-26 | End: 2024-11-26

## 2024-11-26 RX ADMIN — TETRAKIS(2-METHOXYISOBUTYLISOCYANIDE)COPPER(I) TETRAFLUOROBORATE 28.9 MILLICURIE: 1 INJECTION, POWDER, LYOPHILIZED, FOR SOLUTION INTRAVENOUS at 09:05

## 2024-12-12 ENCOUNTER — OFFICE VISIT (OUTPATIENT)
Age: 59
End: 2024-12-12

## 2024-12-12 VITALS
WEIGHT: 276 LBS | HEIGHT: 70 IN | RESPIRATION RATE: 16 BRPM | HEART RATE: 53 BPM | DIASTOLIC BLOOD PRESSURE: 63 MMHG | OXYGEN SATURATION: 98 % | TEMPERATURE: 98.2 F | SYSTOLIC BLOOD PRESSURE: 114 MMHG | BODY MASS INDEX: 39.51 KG/M2

## 2024-12-12 DIAGNOSIS — E78.2 HYPERLIPIDEMIA, MIXED: ICD-10-CM

## 2024-12-12 DIAGNOSIS — I10 ESSENTIAL HYPERTENSION WITH GOAL BLOOD PRESSURE LESS THAN 140/90: ICD-10-CM

## 2024-12-12 DIAGNOSIS — E11.40 TYPE 2 DIABETES MELLITUS WITH DIABETIC NEUROPATHY, WITHOUT LONG-TERM CURRENT USE OF INSULIN (HCC): Primary | ICD-10-CM

## 2024-12-12 NOTE — PROGRESS NOTES
[facility] DIABETES AND ENDOCRINOLOGY                 Mattie Quezada MD           Name : Cece Navarro    YOB: 1965             The patient (or guardian, if applicable) and other individuals in attendance with the patient were advised that Artificial Intelligence will be utilized during this visit to record, process the conversation to generate a clinical note, and support improvement of the AI technology. The patient (or guardian, if applicable) and other individuals in attendance at the appointment consented to the use of AI, including the recording.     Referred by: Ashwin Orellana MD          Cece Navarro is here  for fu of Type 2 Diabetes Mellitus dx at age of 20 years  Dx 1986 at 20 years of age     History of Present Illness    She has been taking metformin  once daily instead of  Twice daily for the past month due to bad reports about metformin on media, without gastrointestinal side effects. She is also on a full dose of Trulicity and Jardiance. She maintains adequate hydration and reports no nausea or severe vomiting. She occasionally experiences pruritus, managed with OTC medications.     She has a history of overactive bladder, She experiences frequent urination and urgency, sometimes with incontinence. Oxybutynin and Myrbetriq have been ineffective. She also reports dry mouth.      She uses a cane for stability due to a history of falls.    MEDICATIONS  Metformin, Trulicity, Jardiance, atorvastatin, oxybutynin, Myrbetriq    Prior history   She was referred by Ashwin Orellana MD for evaluation and management of diabetes.            Reviewed and updated this visit by clinical staff:   Tobacco  Allergies  Meds  Problems  Med Hx  Surg Hx  Fam Hx          Physical Examination:      General: pleasant, no distress, good eye contact    HEENT: no exophthalmos, no periorbital edema, EOMI    Neck: No thyromegaly    CVS: S1-S2 regular    RS: Normal respiratory

## 2024-12-12 NOTE — PROGRESS NOTES
Cece Navarro is a 59 y.o. female here for   Chief Complaint   Patient presents with    Diabetes       1. Have you been to the ER, urgent care clinic since your last visit?  Hospitalized since your last visit? - No    2. Have you seen or consulted any other health care providers outside of the [facility] since your last visit?  Include any pap smears or colon screening.-No

## 2025-04-11 ENCOUNTER — HOSPITAL ENCOUNTER (OUTPATIENT)
Facility: HOSPITAL | Age: 60
Discharge: HOME | End: 2025-04-14
Attending: INTERNAL MEDICINE
Payer: MEDICARE

## 2025-04-11 DIAGNOSIS — Z12.31 VISIT FOR SCREENING MAMMOGRAM: ICD-10-CM

## 2025-04-11 PROCEDURE — 77063 BREAST TOMOSYNTHESIS BI: CPT

## 2025-04-16 DIAGNOSIS — E11.40 TYPE 2 DIABETES MELLITUS WITH DIABETIC NEUROPATHY, WITHOUT LONG-TERM CURRENT USE OF INSULIN (HCC): ICD-10-CM

## 2025-04-23 ENCOUNTER — LAB (OUTPATIENT)
Age: 60
End: 2025-04-23

## 2025-04-23 DIAGNOSIS — I10 ESSENTIAL HYPERTENSION WITH GOAL BLOOD PRESSURE LESS THAN 140/90: ICD-10-CM

## 2025-04-23 DIAGNOSIS — E78.2 HYPERLIPIDEMIA, MIXED: ICD-10-CM

## 2025-04-23 DIAGNOSIS — E11.40 TYPE 2 DIABETES MELLITUS WITH DIABETIC NEUROPATHY, WITHOUT LONG-TERM CURRENT USE OF INSULIN (HCC): ICD-10-CM

## 2025-04-23 LAB
ANION GAP SERPL CALC-SCNC: 3 MMOL/L (ref 2–12)
BUN SERPL-MCNC: 11 MG/DL (ref 6–20)
BUN/CREAT SERPL: 11 (ref 12–20)
CALCIUM SERPL-MCNC: 9 MG/DL (ref 8.5–10.1)
CHLORIDE SERPL-SCNC: 108 MMOL/L (ref 97–108)
CHOLEST SERPL-MCNC: 102 MG/DL
CO2 SERPL-SCNC: 28 MMOL/L (ref 21–32)
CREAT SERPL-MCNC: 1.02 MG/DL (ref 0.55–1.02)
CREAT UR-MCNC: 106 MG/DL
EST. AVERAGE GLUCOSE BLD GHB EST-MCNC: 143 MG/DL
GLUCOSE SERPL-MCNC: 119 MG/DL (ref 65–100)
HBA1C MFR BLD: 6.6 % (ref 4–5.6)
HDLC SERPL-MCNC: 42 MG/DL
HDLC SERPL: 2.4 (ref 0–5)
LDLC SERPL CALC-MCNC: 44.8 MG/DL (ref 0–100)
MICROALBUMIN UR-MCNC: 9.75 MG/DL
MICROALBUMIN/CREAT UR-RTO: 92 MG/G (ref 0–30)
POTASSIUM SERPL-SCNC: 4 MMOL/L (ref 3.5–5.1)
SODIUM SERPL-SCNC: 139 MMOL/L (ref 136–145)
TRIGL SERPL-MCNC: 76 MG/DL
VLDLC SERPL CALC-MCNC: 15.2 MG/DL

## 2025-04-29 ENCOUNTER — OFFICE VISIT (OUTPATIENT)
Age: 60
End: 2025-04-29
Payer: MEDICARE

## 2025-04-29 VITALS
HEIGHT: 70 IN | HEART RATE: 46 BPM | BODY MASS INDEX: 38.65 KG/M2 | RESPIRATION RATE: 20 BRPM | WEIGHT: 270 LBS | TEMPERATURE: 98.2 F | OXYGEN SATURATION: 98 % | DIASTOLIC BLOOD PRESSURE: 62 MMHG | SYSTOLIC BLOOD PRESSURE: 126 MMHG

## 2025-04-29 DIAGNOSIS — I10 ESSENTIAL HYPERTENSION WITH GOAL BLOOD PRESSURE LESS THAN 140/90: ICD-10-CM

## 2025-04-29 DIAGNOSIS — E78.2 HYPERLIPIDEMIA, MIXED: ICD-10-CM

## 2025-04-29 DIAGNOSIS — E11.40 TYPE 2 DIABETES MELLITUS WITH DIABETIC NEUROPATHY, WITHOUT LONG-TERM CURRENT USE OF INSULIN (HCC): Primary | ICD-10-CM

## 2025-04-29 PROCEDURE — 2022F DILAT RTA XM EVC RTNOPTHY: CPT | Performed by: INTERNAL MEDICINE

## 2025-04-29 PROCEDURE — 3074F SYST BP LT 130 MM HG: CPT | Performed by: INTERNAL MEDICINE

## 2025-04-29 PROCEDURE — 3017F COLORECTAL CA SCREEN DOC REV: CPT | Performed by: INTERNAL MEDICINE

## 2025-04-29 PROCEDURE — 3044F HG A1C LEVEL LT 7.0%: CPT | Performed by: INTERNAL MEDICINE

## 2025-04-29 PROCEDURE — G8417 CALC BMI ABV UP PARAM F/U: HCPCS | Performed by: INTERNAL MEDICINE

## 2025-04-29 PROCEDURE — G8427 DOCREV CUR MEDS BY ELIG CLIN: HCPCS | Performed by: INTERNAL MEDICINE

## 2025-04-29 PROCEDURE — 99214 OFFICE O/P EST MOD 30 MIN: CPT | Performed by: INTERNAL MEDICINE

## 2025-04-29 PROCEDURE — 1036F TOBACCO NON-USER: CPT | Performed by: INTERNAL MEDICINE

## 2025-04-29 PROCEDURE — 3078F DIAST BP <80 MM HG: CPT | Performed by: INTERNAL MEDICINE

## 2025-04-29 PROCEDURE — G2211 COMPLEX E/M VISIT ADD ON: HCPCS | Performed by: INTERNAL MEDICINE

## 2025-04-29 RX ORDER — SEMAGLUTIDE 2.68 MG/ML
INJECTION, SOLUTION SUBCUTANEOUS
Qty: 9 ML | Refills: 3 | Status: ACTIVE | OUTPATIENT
Start: 2025-04-29

## 2025-04-29 RX ORDER — CHOLECALCIFEROL (VITAMIN D3) 25 MCG
TABLET ORAL
COMMUNITY
Start: 2025-04-28

## 2025-04-29 NOTE — PROGRESS NOTES
[facility] DIABETES AND ENDOCRINOLOGY                 Mattie Quezada MD           Name : Cece Navarro    YOB: 1965             The patient (or guardian, if applicable) and other individuals in attendance with the patient were advised that Artificial Intelligence will be utilized during this visit to record, process the conversation to generate a clinical note, and support improvement of the AI technology. The patient (or guardian, if applicable) and other individuals in attendance at the appointment consented to the use of AI, including the recording.     Referred by: Ashwin Orellana MD          Cece Navarro is here  for fu of Type 2 Diabetes Mellitus dx at age of 20 years  Dx 1986 at 20 years of age     History of Present Illness    The patient presents for evaluation of diabetes mellitus     Diabetes Mellitus  - She reports good tolerance to Trulicity without experiencing any adverse effects.    - The patient maintains adequate hydration and reports no cardiac symptoms.  - There have been no changes in her vision, and an ophthalmologic examination is scheduled for 2024.      - She experiences occasional episodes of vaginal itching, which she manages with Monistat 7.  - The frequency of these infections is uncertain.    Supplemental information: Her last Pap smear was conducted approximately in December 2024.    Prior history   She was referred by Ashwin Orellana MD for evaluation and management of diabetes.            Reviewed and updated this visit by clinical staff:   Tobacco  Allergies  Meds  Problems  Med Hx  Surg Hx  Fam Hx          Physical Examination:      General: pleasant, no distress, good eye contact    HEENT: no exophthalmos, no periorbital edema, EOMI    Neck: No thyromegaly    CVS: S1-S2 regular    RS: Normal respiratory effort    Musculoskeletal: no tremors    Neurological: alert and oriented    Psychiatric: normal mood and affect    Skin: Normal

## 2025-04-29 NOTE — PROGRESS NOTES
Cece Navarro is a 59 y.o. female here for   Chief Complaint   Patient presents with    Diabetes       1. Have you been to the ER, urgent care clinic since your last visit?  Hospitalized since your last visit? -no    2. Have you seen or consulted any other health care providers outside of the [facility] since your last visit?  Include any pap smears or colon screening.-Ortho

## 2025-07-27 DIAGNOSIS — E11.65 TYPE 2 DIABETES MELLITUS WITH HYPERGLYCEMIA, WITHOUT LONG-TERM CURRENT USE OF INSULIN (HCC): ICD-10-CM

## 2025-07-28 RX ORDER — EMPAGLIFLOZIN 10 MG/1
10 TABLET, FILM COATED ORAL DAILY
Qty: 90 TABLET | Refills: 3 | Status: ACTIVE | OUTPATIENT
Start: 2025-07-28

## 2025-08-11 ENCOUNTER — TELEPHONE (OUTPATIENT)
Age: 60
End: 2025-08-11

## 2025-08-11 ENCOUNTER — OFFICE VISIT (OUTPATIENT)
Age: 60
End: 2025-08-11
Payer: MEDICARE

## 2025-08-11 VITALS
SYSTOLIC BLOOD PRESSURE: 126 MMHG | DIASTOLIC BLOOD PRESSURE: 62 MMHG | BODY MASS INDEX: 38.74 KG/M2 | OXYGEN SATURATION: 98 % | HEART RATE: 68 BPM | HEIGHT: 70 IN

## 2025-08-11 DIAGNOSIS — M25.562 PAIN IN BOTH KNEES, UNSPECIFIED CHRONICITY: ICD-10-CM

## 2025-08-11 DIAGNOSIS — M25.561 PAIN IN BOTH KNEES, UNSPECIFIED CHRONICITY: ICD-10-CM

## 2025-08-11 DIAGNOSIS — M17.11 PRIMARY OSTEOARTHRITIS OF RIGHT KNEE: Primary | ICD-10-CM

## 2025-08-11 PROBLEM — M17.12 PRIMARY OSTEOARTHRITIS OF LEFT KNEE: Status: ACTIVE | Noted: 2025-08-11

## 2025-08-11 PROCEDURE — 3017F COLORECTAL CA SCREEN DOC REV: CPT | Performed by: ORTHOPAEDIC SURGERY

## 2025-08-11 PROCEDURE — G8417 CALC BMI ABV UP PARAM F/U: HCPCS | Performed by: ORTHOPAEDIC SURGERY

## 2025-08-11 PROCEDURE — 99203 OFFICE O/P NEW LOW 30 MIN: CPT | Performed by: ORTHOPAEDIC SURGERY

## 2025-08-11 PROCEDURE — G8427 DOCREV CUR MEDS BY ELIG CLIN: HCPCS | Performed by: ORTHOPAEDIC SURGERY

## 2025-08-11 PROCEDURE — 3078F DIAST BP <80 MM HG: CPT | Performed by: ORTHOPAEDIC SURGERY

## 2025-08-11 PROCEDURE — 3074F SYST BP LT 130 MM HG: CPT | Performed by: ORTHOPAEDIC SURGERY

## 2025-08-11 PROCEDURE — 1036F TOBACCO NON-USER: CPT | Performed by: ORTHOPAEDIC SURGERY

## 2025-08-11 ASSESSMENT — PATIENT HEALTH QUESTIONNAIRE - PHQ9
2. FEELING DOWN, DEPRESSED OR HOPELESS: NOT AT ALL
SUM OF ALL RESPONSES TO PHQ QUESTIONS 1-9: 0
1. LITTLE INTEREST OR PLEASURE IN DOING THINGS: NOT AT ALL
SUM OF ALL RESPONSES TO PHQ QUESTIONS 1-9: 0

## 2025-08-19 ENCOUNTER — LAB (OUTPATIENT)
Age: 60
End: 2025-08-19

## 2025-08-19 DIAGNOSIS — E78.2 HYPERLIPIDEMIA, MIXED: ICD-10-CM

## 2025-08-19 DIAGNOSIS — E11.40 TYPE 2 DIABETES MELLITUS WITH DIABETIC NEUROPATHY, WITHOUT LONG-TERM CURRENT USE OF INSULIN (HCC): ICD-10-CM

## 2025-08-19 LAB
ANION GAP SERPL CALC-SCNC: 12 MMOL/L (ref 2–14)
BUN SERPL-MCNC: 13 MG/DL (ref 6–20)
BUN/CREAT SERPL: 16 (ref 12–20)
CALCIUM SERPL-MCNC: 9 MG/DL (ref 8.6–10)
CHLORIDE SERPL-SCNC: 107 MMOL/L (ref 98–107)
CO2 SERPL-SCNC: 24 MMOL/L (ref 20–29)
CREAT SERPL-MCNC: 0.81 MG/DL (ref 0.6–1)
CREAT UR-MCNC: 104 MG/DL (ref 28–217)
EST. AVERAGE GLUCOSE BLD GHB EST-MCNC: 145 MG/DL
GLUCOSE SERPL-MCNC: 119 MG/DL (ref 65–100)
HBA1C MFR BLD: 6.7 % (ref 4–5.6)
LDLC SERPL DIRECT ASSAY-MCNC: 55 MG/DL (ref 0–100)
MICROALBUMIN UR-MCNC: <1.2 MG/DL
MICROALBUMIN/CREAT UR-RTO: NORMAL MG/G
POTASSIUM SERPL-SCNC: 4.1 MMOL/L (ref 3.5–5.1)
SODIUM SERPL-SCNC: 143 MMOL/L (ref 136–145)

## 2025-08-26 ENCOUNTER — OFFICE VISIT (OUTPATIENT)
Age: 60
End: 2025-08-26
Payer: MEDICARE

## 2025-08-26 VITALS
TEMPERATURE: 98.4 F | DIASTOLIC BLOOD PRESSURE: 59 MMHG | HEART RATE: 68 BPM | HEIGHT: 70 IN | BODY MASS INDEX: 38.6 KG/M2 | WEIGHT: 269.6 LBS | SYSTOLIC BLOOD PRESSURE: 109 MMHG | OXYGEN SATURATION: 98 %

## 2025-08-26 DIAGNOSIS — E78.2 HYPERLIPIDEMIA, MIXED: ICD-10-CM

## 2025-08-26 DIAGNOSIS — I10 ESSENTIAL HYPERTENSION WITH GOAL BLOOD PRESSURE LESS THAN 140/90: ICD-10-CM

## 2025-08-26 DIAGNOSIS — E11.40 TYPE 2 DIABETES MELLITUS WITH DIABETIC NEUROPATHY, WITHOUT LONG-TERM CURRENT USE OF INSULIN (HCC): Primary | ICD-10-CM

## 2025-08-26 PROCEDURE — 2022F DILAT RTA XM EVC RTNOPTHY: CPT | Performed by: INTERNAL MEDICINE

## 2025-08-26 PROCEDURE — 3044F HG A1C LEVEL LT 7.0%: CPT | Performed by: INTERNAL MEDICINE

## 2025-08-26 PROCEDURE — G8427 DOCREV CUR MEDS BY ELIG CLIN: HCPCS | Performed by: INTERNAL MEDICINE

## 2025-08-26 PROCEDURE — 99214 OFFICE O/P EST MOD 30 MIN: CPT | Performed by: INTERNAL MEDICINE

## 2025-08-26 PROCEDURE — 3017F COLORECTAL CA SCREEN DOC REV: CPT | Performed by: INTERNAL MEDICINE

## 2025-08-26 PROCEDURE — 3074F SYST BP LT 130 MM HG: CPT | Performed by: INTERNAL MEDICINE

## 2025-08-26 PROCEDURE — 1036F TOBACCO NON-USER: CPT | Performed by: INTERNAL MEDICINE

## 2025-08-26 PROCEDURE — G8417 CALC BMI ABV UP PARAM F/U: HCPCS | Performed by: INTERNAL MEDICINE

## 2025-08-26 PROCEDURE — G2211 COMPLEX E/M VISIT ADD ON: HCPCS | Performed by: INTERNAL MEDICINE

## 2025-08-26 PROCEDURE — 3078F DIAST BP <80 MM HG: CPT | Performed by: INTERNAL MEDICINE

## (undated) DEVICE — GARMENT,MEDLINE,DVT,INT,CALF,MED, GEN2: Brand: MEDLINE

## (undated) DEVICE — GLOVE ORANGE PI 7 1/2   MSG9075

## (undated) DEVICE — SOLUTION IRRIG 3000ML 0.9% SOD CHL USP UROMATIC PLAS CONT

## (undated) DEVICE — CATHETER,FOLEY,100%SILICONE,20FR,10ML,LF: Brand: MEDLINE

## (undated) DEVICE — FORCEPS BX L240CM JAW DIA2.4MM ORNG L CAP W/ NDL DISP RAD

## (undated) DEVICE — PREP PAD BNS: Brand: CONVERTORS

## (undated) DEVICE — SOLUTION IRRIG 500ML STRL H2O NONPYROGENIC

## (undated) DEVICE — CYSTO PACK: Brand: MEDLINE INDUSTRIES, INC.

## (undated) DEVICE — SOLUTION SCRB 4OZ 4% CHG H2O AIDED FOR PREOPERATIVE SKIN

## (undated) DEVICE — MASK O2 ADULT POM PROCEDURAL OXYGEN MASK 7FT O2 TUBING MEDIU

## (undated) DEVICE — MASK ANES INF SZ 2 PREM TAIL VLV INFL PRT UNSCENTED SGL PT

## (undated) DEVICE — TUBING, SUCTION, 1/4" X 12', STRAIGHT: Brand: MEDLINE

## (undated) DEVICE — BAG,DRAINAGE,ANTI-REFLUX TOWER,2000ML: Brand: MEDLINE

## (undated) DEVICE — SYRINGE,TOOMEY,IRRIGATION,70CC,STERILE: Brand: MEDLINE

## (undated) DEVICE — MASK O2 MED AD 7 FT 3 IN 1 W/ STD CONN LTX

## (undated) DEVICE — Device: Brand: INJETAK ADJUSTABLE TIP NEEDLE 35CM

## (undated) DEVICE — CORD LAPAROSCOPIC MONOPOLAR

## (undated) DEVICE — LINE SAMP LNG AD ORAL NSL PT O2 TBNG SMRT CAPNOLN H +

## (undated) DEVICE — KIT PRECLEANING BS ENDOSCP